# Patient Record
Sex: MALE | Race: WHITE | NOT HISPANIC OR LATINO | Employment: OTHER | URBAN - METROPOLITAN AREA
[De-identification: names, ages, dates, MRNs, and addresses within clinical notes are randomized per-mention and may not be internally consistent; named-entity substitution may affect disease eponyms.]

---

## 2017-05-30 ENCOUNTER — OFFICE VISIT (OUTPATIENT)
Dept: ORTHOPEDIC SURGERY | Facility: CLINIC | Age: 67
End: 2017-05-30

## 2017-05-30 VITALS
WEIGHT: 165 LBS | HEIGHT: 70 IN | DIASTOLIC BLOOD PRESSURE: 71 MMHG | BODY MASS INDEX: 23.62 KG/M2 | SYSTOLIC BLOOD PRESSURE: 110 MMHG | HEART RATE: 64 BPM

## 2017-05-30 DIAGNOSIS — Z96.641 STATUS POST TOTAL HIP REPLACEMENT, RIGHT: Primary | ICD-10-CM

## 2017-05-30 PROCEDURE — 99204 OFFICE O/P NEW MOD 45 MIN: CPT | Performed by: ORTHOPAEDIC SURGERY

## 2017-05-30 RX ORDER — TRAMADOL HYDROCHLORIDE 50 MG/1
50 TABLET ORAL EVERY 6 HOURS
COMMUNITY
Start: 2015-12-21 | End: 2017-05-30 | Stop reason: SDUPTHER

## 2017-05-30 RX ORDER — TRAMADOL HYDROCHLORIDE 50 MG/1
50 TABLET ORAL EVERY 6 HOURS
Qty: 90 TABLET | Refills: 0 | Status: SHIPPED | OUTPATIENT
Start: 2017-05-30 | End: 2017-11-21 | Stop reason: SDUPTHER

## 2017-05-30 RX ORDER — CEPHALEXIN 500 MG/1
500 CAPSULE ORAL 3 TIMES DAILY
Refills: 1 | COMMUNITY
Start: 2017-03-23

## 2017-08-22 ENCOUNTER — TELEPHONE (OUTPATIENT)
Dept: ORTHOPEDIC SURGERY | Facility: CLINIC | Age: 67
End: 2017-08-22

## 2017-08-22 NOTE — TELEPHONE ENCOUNTER
[facility] NEEDS A VERBAL ON THE PATIENTS TRAMADOL THAT WAS APPROVED AND FAXED OVER TODAY BECAUSE IT IS A CONTROLLED. PHONE [phone].

## 2017-08-22 NOTE — TELEPHONE ENCOUNTER
I spoke with the pharmacy and after reading the note in the patient's chart for prescription of Tramadol I gave verbal approval for the Tramadol 50mg 1 po every 6 hours prn for pain with 90 tablets and no refills. Roula PETERSEN)

## 2017-11-17 ENCOUNTER — CLINICAL SUPPORT (OUTPATIENT)
Dept: ORTHOPEDIC SURGERY | Facility: CLINIC | Age: 67
End: 2017-11-17

## 2017-11-17 VITALS
HEART RATE: 73 BPM | DIASTOLIC BLOOD PRESSURE: 89 MMHG | SYSTOLIC BLOOD PRESSURE: 125 MMHG | BODY MASS INDEX: 24.05 KG/M2 | HEIGHT: 70 IN | WEIGHT: 168 LBS

## 2017-11-17 DIAGNOSIS — M16.12 PRIMARY OSTEOARTHRITIS OF LEFT HIP: Primary | ICD-10-CM

## 2017-11-17 DIAGNOSIS — R52 PAIN: ICD-10-CM

## 2017-11-17 PROCEDURE — 99214 OFFICE O/P EST MOD 30 MIN: CPT | Performed by: ORTHOPAEDIC SURGERY

## 2017-11-17 RX ORDER — MELOXICAM 15 MG/1
TABLET ORAL
Qty: 60 TABLET | Refills: 0 | Status: SHIPPED | OUTPATIENT
Start: 2017-11-17 | End: 2017-11-30

## 2017-11-17 NOTE — PROGRESS NOTES
Orthopaedic Clinic Note: Hip Established Patient    Chief Complaint   Patient presents with   • Left Hip - Pain        HPI    It has been 6  month(s) since Mr. Paz's last visit. He returns to clinic today for New onset of left hip pain.  He is previously seen for revision right hip arthroplasty after infection.  He is on chronic suppressive antibiotics and has been doing well with the right hip arthroplasty.  In regards to the left hip, leg, groin pain, he states this pain began approximately 2 weeks ago.  He denies any injury or inciting event.  He states the pain is worse with bending, lifting the leg.  Resting, sitting improve the pain.  He rates pain a 4/10 on the pain scale today.  He is still able to perform most daily activities but does experience some discomfort.  No interventions for this have been tried to date.  He denies fevers, chills, constitutional symptoms.    Past Medical History:   Diagnosis Date   • Kidney stone       Past Surgical History:   Procedure Laterality Date   • HIP SURGERY Right     [facility]      Family History   Problem Relation Age of Onset   • Cancer Mother    • Cancer Father      Social History     Social History   • Marital status:      Spouse name: N/A   • Number of children: N/A   • Years of education: N/A     Occupational History   • Not on file.     Social History Main Topics   • Smoking status: Never Smoker   • Smokeless tobacco: Never Used   • Alcohol use Yes      Comment: occasional   • Drug use: No   • Sexual activity: Defer     Other Topics Concern   • Not on file     Social History Narrative      Current Outpatient Prescriptions on File Prior to Visit   Medication Sig Dispense Refill   • cephalexin (KEFLEX) 500 MG capsule   1   • traMADol (ULTRAM) 50 MG tablet Take 1 tablet by mouth Every 6 (Six) Hours. (Patient taking differently: Take 50 mg by mouth As Needed.) 90 tablet 0     No current facility-administered medications on file prior to visit.      "  No Known Allergies     Review of Systems     Physical Exam  Blood pressure 125/89, pulse 73, height 69.75\" (177.2 cm), weight 168 lb (76.2 kg).    Body mass index is 24.28 kg/(m^2).    GENERAL APPEARANCE: awake, alert, oriented, in no acute distress  LUNGS:  breathing nonlabored  EXTREMITIES: no clubbing, cyanosis  PERIPHERAL PULSES: palpable dorsalis pedis and posterior tibial pulses bilaterally.    GAIT:  Trendelenberg            Hip Exam:  Left    RANGE OF MOTION:  EXTENSION/FLEXION:  normal (0-110 degrees)  IR (at 90 degrees of flexion):  neutral  ER (at 90 degrees of flexion):  30  PAIN WITH HIP MOTION:  yes, Localized to groin and radiating down the medial thigh  PAIN WITH LOGROLL:  no     STINCHFIELD TEST: positive    STRENGTH:  ABDUCTOR:  4/5  ADDUCTOR:  5/5  HIP FLEXION:  5/5    GREATER TROCHANTER BURSAL PAIN:  yes    SENSATION TO LIGHT TOUCH:  DEEP PERONEAL/SUPERFICIAL PERONEAL/SURAL/SAPHENOUS/TIBIAL:   intact    EDEMA:  no  ERYTHEMA:  no  WOUNDS/INCISIONS:  no  _________________________________________________________________  _________________________________________________________________    RADIOGRAPHIC FINDINGS:   Indication: Left hip pain    Comparison: Todays xrays were compared to previous xrays from 5/30/17     AP pelvis: Right: Demonstrate a well positioned revision total hip without evidence of wear, loosening, fracture or osteolysis, femoral head is concentrically reduced within the acetabulum and No significant changes compared to prior radiographs.;Left: advanced, end-stage osteoarthritis with bone on bone articulation, subchondral sclerosis, and subchondral cysts, there are marginal osteophytes visualized at the femoral head-neck junction and Radiographs demonstrate advancing arthritic changes and wear compared to prior radiographs.      Assessment/Plan:   Diagnosis Plan   1. Primary osteoarthritis of left hip     2. Pain  XR Hip With or Without Pelvis 2 - 3 View Left     The patient has " clinical and radiographic evidence of advancing left hip osteoarthritis.  I discussed treatment options with him including surgical and nonsurgical intervention.  Patient is declining surgical intervention at this time as he is having to take care of his wife who has advanced stages of dementia.  Therefore we will proceed with conservative intervention.  I'll prescribe him an oral anti-inflammatory to be taken daily and send him to radiology for fluoroscopic guided left hip steroid injection.  I'll see him back in 3 months for repeat evaluation.  He was agreeable to this plan.    Reed Lackey MD  11/17/17  10:33 AM

## 2017-11-17 NOTE — PROGRESS NOTES
Orthopaedic Clinic Note: Hip New Patient    Chief Complaint   Patient presents with   • Left Hip - Pain        HPI    Prashanth Paz is a 67 y.o. male who presents with {right/left/bilateral:47457} hip pain for {Numbers; 0-30:30642} {DAYS, WEEKS, MONTHS, YEARS:35180}. Onset ***. Pain is localized to {Hip Pain Location:16424} and is a {0-10:79739}/10 on the pain scale. The pain is worse with {Movement:19389}; {Home Tx:48601} improve the pain. Previous treatments have included: {Previous Tx:94241} {Duration:30213}. Although some transient relief was reported with these interventions, these conservative measures have failed and symptoms have persisted. The patient is limited in daily activities and has had a significant decrease in quality of life as a result. He {denies/admits drainage:31413}      Past Medical History:   Diagnosis Date   • Kidney stone       Past Surgical History:   Procedure Laterality Date   • HIP SURGERY Right     [facility]      Family History   Problem Relation Age of Onset   • Cancer Mother    • Cancer Father      Social History     Social History   • Marital status:      Spouse name: N/A   • Number of children: N/A   • Years of education: N/A     Occupational History   • Not on file.     Social History Main Topics   • Smoking status: Never Smoker   • Smokeless tobacco: Never Used   • Alcohol use Yes      Comment: occasional   • Drug use: No   • Sexual activity: Defer     Other Topics Concern   • Not on file     Social History Narrative      Current Outpatient Prescriptions on File Prior to Visit   Medication Sig Dispense Refill   • cephalexin (KEFLEX) 500 MG capsule   1   • traMADol (ULTRAM) 50 MG tablet Take 1 tablet by mouth Every 6 (Six) Hours. (Patient taking differently: Take 50 mg by mouth As Needed.) 90 tablet 0     No current facility-administered medications on file prior to visit.       No Known Allergies     Review of Systems   Constitutional: Positive for activity  "change.   HENT: Positive for congestion.    Eyes: Negative.    Respiratory: Negative.    Cardiovascular: Negative.    Gastrointestinal: Negative.    Endocrine: Negative.    Genitourinary: Negative.    Musculoskeletal: Positive for arthralgias.   Skin: Negative.    Allergic/Immunologic: Negative.    Neurological: Negative.    Hematological: Bruises/bleeds easily.   Psychiatric/Behavioral: Negative.         {Common H&P Review Areas:00476}    Physical Exam  Blood pressure 125/89, pulse 73, height 69.75\" (177.2 cm), weight 168 lb (76.2 kg).    Body mass index is 24.28 kg/(m^2).    GENERAL APPEARANCE: {General Appearance:35766::\"awake, alert & oriented x 3, in no acute distress\",\"well developed, well nourished\"}  PSYCH: normal affect  LUNGS:  breathing nonlabored  EYES: sclera anicteric  CARDIOVASCULAR: palpable dorsalis pedis, palpable posterior tibial bilaterally. Capillary refill less than 2 seconds  EXTREMITIES: no clubbing, cyanosis  GAIT:  {Gait:79400}           Right Hip Exam:  ***    Left Hip Exam:   ***    ------------------------------------------------------------------    LEG LENGTHS:  {Leg Lengths:57622}  _____________________________________________________  _____________________________________________________    RADIOGRAPHIC FINDINGS:   Indication: ***    Comparison: {Darya XR comparison:50017}    AP pelvis, hip 2 views: Right: {Darya xr findings hip:08586}; Left: {Darya xr findings hip:24044}    Assessment/Plan:  No diagnosis found.  ***    Diana Rick  11/17/17  9:58 AM  "

## 2017-11-21 ENCOUNTER — TELEPHONE (OUTPATIENT)
Dept: ORTHOPEDIC SURGERY | Facility: CLINIC | Age: 67
End: 2017-11-21

## 2017-11-21 RX ORDER — TRAMADOL HYDROCHLORIDE 50 MG/1
TABLET ORAL
Qty: 90 TABLET | Refills: 0 | Status: SHIPPED | OUTPATIENT
Start: 2017-11-21 | End: 2017-12-12 | Stop reason: HOSPADM

## 2017-11-21 NOTE — TELEPHONE ENCOUNTER
Left message for patient letting him know his Tramadol prescription is ready to be picked up. --aco

## 2017-11-28 RX ORDER — MELOXICAM 7.5 MG/1
15 TABLET ORAL ONCE
Status: CANCELLED | OUTPATIENT
Start: 2017-11-28 | End: 2017-11-28

## 2017-11-28 RX ORDER — OXYCODONE HCL 10 MG/1
10 TABLET, FILM COATED, EXTENDED RELEASE ORAL ONCE
Status: CANCELLED | OUTPATIENT
Start: 2017-11-28 | End: 2017-11-28

## 2017-11-28 RX ORDER — PREGABALIN 25 MG/1
75 CAPSULE ORAL ONCE
Status: CANCELLED | OUTPATIENT
Start: 2017-11-28 | End: 2017-11-28

## 2017-11-28 RX ORDER — ACETAMINOPHEN 325 MG/1
1000 TABLET ORAL ONCE
Status: CANCELLED | OUTPATIENT
Start: 2017-11-28 | End: 2017-11-28

## 2017-11-29 PROBLEM — M16.12 PRIMARY OSTEOARTHRITIS OF LEFT HIP: Status: ACTIVE | Noted: 2017-11-29

## 2017-11-30 ENCOUNTER — APPOINTMENT (OUTPATIENT)
Dept: PREADMISSION TESTING | Facility: HOSPITAL | Age: 67
End: 2017-11-30

## 2017-11-30 VITALS — WEIGHT: 173.06 LBS | HEIGHT: 70 IN | BODY MASS INDEX: 24.78 KG/M2

## 2017-11-30 DIAGNOSIS — M16.12 PRIMARY OSTEOARTHRITIS OF LEFT HIP: ICD-10-CM

## 2017-11-30 DIAGNOSIS — Z01.89 LABORATORY TEST: Primary | ICD-10-CM

## 2017-11-30 LAB
ABO GROUP BLD: NORMAL
ALBUMIN SERPL-MCNC: 4 G/DL (ref 3.2–4.8)
ALBUMIN/GLOB SERPL: 2 G/DL (ref 1.5–2.5)
ALP SERPL-CCNC: 76 U/L (ref 25–100)
ALT SERPL W P-5'-P-CCNC: 31 U/L (ref 7–40)
ANION GAP SERPL CALCULATED.3IONS-SCNC: 7 MMOL/L (ref 3–11)
APTT PPP: 28.4 SECONDS (ref 24–31)
AST SERPL-CCNC: 28 U/L (ref 0–33)
BASOPHILS # BLD AUTO: 0.05 10*3/MM3 (ref 0–0.2)
BASOPHILS NFR BLD AUTO: 1 % (ref 0–1)
BILIRUB SERPL-MCNC: 0.3 MG/DL (ref 0.3–1.2)
BILIRUB UR QL STRIP: NEGATIVE
BUN BLD-MCNC: 20 MG/DL (ref 9–23)
BUN/CREAT SERPL: 25 (ref 7–25)
CALCIUM SPEC-SCNC: 9.2 MG/DL (ref 8.7–10.4)
CHLORIDE SERPL-SCNC: 107 MMOL/L (ref 99–109)
CLARITY UR: CLEAR
CO2 SERPL-SCNC: 27 MMOL/L (ref 20–31)
COLOR UR: YELLOW
CREAT BLD-MCNC: 0.8 MG/DL (ref 0.6–1.3)
DEPRECATED RDW RBC AUTO: 44.1 FL (ref 37–54)
EOSINOPHIL # BLD AUTO: 0.23 10*3/MM3 (ref 0–0.3)
EOSINOPHIL NFR BLD AUTO: 4.5 % (ref 0–3)
ERYTHROCYTE [DISTWIDTH] IN BLOOD BY AUTOMATED COUNT: 12.8 % (ref 11.3–14.5)
GFR SERPL CREATININE-BSD FRML MDRD: 96 ML/MIN/1.73
GLOBULIN UR ELPH-MCNC: 2 GM/DL
GLUCOSE BLD-MCNC: 106 MG/DL (ref 70–100)
GLUCOSE UR STRIP-MCNC: NEGATIVE MG/DL
HBA1C MFR BLD: 5.7 % (ref 4.8–5.6)
HCT VFR BLD AUTO: 40.9 % (ref 38.9–50.9)
HGB BLD-MCNC: 13.9 G/DL (ref 13.1–17.5)
HGB UR QL STRIP.AUTO: NEGATIVE
IMM GRANULOCYTES # BLD: 0.01 10*3/MM3 (ref 0–0.03)
IMM GRANULOCYTES NFR BLD: 0.2 % (ref 0–0.6)
INR PPP: 0.95
KETONES UR QL STRIP: NEGATIVE
LEUKOCYTE ESTERASE UR QL STRIP.AUTO: NEGATIVE
LYMPHOCYTES # BLD AUTO: 1.44 10*3/MM3 (ref 0.6–4.8)
LYMPHOCYTES NFR BLD AUTO: 28 % (ref 24–44)
MCH RBC QN AUTO: 32.3 PG (ref 27–31)
MCHC RBC AUTO-ENTMCNC: 34 G/DL (ref 32–36)
MCV RBC AUTO: 95.1 FL (ref 80–99)
MONOCYTES # BLD AUTO: 0.51 10*3/MM3 (ref 0–1)
MONOCYTES NFR BLD AUTO: 9.9 % (ref 0–12)
NEUTROPHILS # BLD AUTO: 2.91 10*3/MM3 (ref 1.5–8.3)
NEUTROPHILS NFR BLD AUTO: 56.4 % (ref 41–71)
NITRITE UR QL STRIP: NEGATIVE
PH UR STRIP.AUTO: 6 [PH] (ref 5–8)
PLATELET # BLD AUTO: 252 10*3/MM3 (ref 150–450)
PMV BLD AUTO: 10 FL (ref 6–12)
POTASSIUM BLD-SCNC: 4.5 MMOL/L (ref 3.5–5.5)
PROT SERPL-MCNC: 6 G/DL (ref 5.7–8.2)
PROT UR QL STRIP: NEGATIVE
PROTHROMBIN TIME: 10.3 SECONDS (ref 9.6–11.5)
RBC # BLD AUTO: 4.3 10*6/MM3 (ref 4.2–5.76)
RH BLD: POSITIVE
SODIUM BLD-SCNC: 141 MMOL/L (ref 132–146)
SP GR UR STRIP: 1.02 (ref 1–1.03)
UROBILINOGEN UR QL STRIP: NORMAL
WBC NRBC COR # BLD: 5.15 10*3/MM3 (ref 3.5–10.8)

## 2017-11-30 PROCEDURE — 85025 COMPLETE CBC W/AUTO DIFF WBC: CPT | Performed by: ORTHOPAEDIC SURGERY

## 2017-11-30 PROCEDURE — 85610 PROTHROMBIN TIME: CPT | Performed by: ORTHOPAEDIC SURGERY

## 2017-11-30 PROCEDURE — 80053 COMPREHEN METABOLIC PANEL: CPT | Performed by: ORTHOPAEDIC SURGERY

## 2017-11-30 PROCEDURE — 86901 BLOOD TYPING SEROLOGIC RH(D): CPT

## 2017-11-30 PROCEDURE — 93005 ELECTROCARDIOGRAM TRACING: CPT

## 2017-11-30 PROCEDURE — 81003 URINALYSIS AUTO W/O SCOPE: CPT | Performed by: ORTHOPAEDIC SURGERY

## 2017-11-30 PROCEDURE — 86900 BLOOD TYPING SEROLOGIC ABO: CPT

## 2017-11-30 PROCEDURE — 85730 THROMBOPLASTIN TIME PARTIAL: CPT | Performed by: ORTHOPAEDIC SURGERY

## 2017-11-30 PROCEDURE — G0480 DRUG TEST DEF 1-7 CLASSES: HCPCS | Performed by: ORTHOPAEDIC SURGERY

## 2017-11-30 PROCEDURE — 83036 HEMOGLOBIN GLYCOSYLATED A1C: CPT | Performed by: ORTHOPAEDIC SURGERY

## 2017-11-30 PROCEDURE — 36415 COLL VENOUS BLD VENIPUNCTURE: CPT

## 2017-11-30 ASSESSMENT — HOOS JR
HOOS JR SCORE: 64.664
HOOS JR SCORE: 8

## 2017-12-05 LAB
COTININE UR-MCNC: NORMAL NG/ML
NICOTINE SERPL-MCNC: NORMAL NG/ML

## 2017-12-05 RX ORDER — CHLORHEXIDINE GLUCONATE 4 G/100ML
SOLUTION TOPICAL DAILY
Qty: 236 ML | Refills: 0 | Status: SHIPPED | OUTPATIENT
Start: 2017-12-05 | End: 2017-12-12 | Stop reason: HOSPADM

## 2017-12-11 ENCOUNTER — HOSPITAL ENCOUNTER (INPATIENT)
Facility: HOSPITAL | Age: 67
LOS: 1 days | Discharge: HOME | End: 2017-12-12
Attending: ORTHOPAEDIC SURGERY | Admitting: ORTHOPAEDIC SURGERY

## 2017-12-11 ENCOUNTER — ANESTHESIA (OUTPATIENT)
Dept: PERIOP | Facility: HOSPITAL | Age: 67
End: 2017-12-11

## 2017-12-11 ENCOUNTER — APPOINTMENT (OUTPATIENT)
Dept: GENERAL RADIOLOGY | Facility: HOSPITAL | Age: 67
End: 2017-12-11

## 2017-12-11 ENCOUNTER — ANESTHESIA EVENT (OUTPATIENT)
Dept: PERIOP | Facility: HOSPITAL | Age: 67
End: 2017-12-11

## 2017-12-11 DIAGNOSIS — Z78.9 IMPAIRED MOBILITY AND ADLS: ICD-10-CM

## 2017-12-11 DIAGNOSIS — Z96.642 STATUS POST TOTAL REPLACEMENT OF LEFT HIP: ICD-10-CM

## 2017-12-11 DIAGNOSIS — M16.12 PRIMARY OSTEOARTHRITIS OF LEFT HIP: ICD-10-CM

## 2017-12-11 DIAGNOSIS — Z74.09 IMPAIRED MOBILITY AND ADLS: ICD-10-CM

## 2017-12-11 DIAGNOSIS — Z74.09 IMPAIRED FUNCTIONAL MOBILITY, BALANCE, GAIT, AND ENDURANCE: Primary | ICD-10-CM

## 2017-12-11 PROBLEM — R73.03 PREDIABETES: Status: ACTIVE | Noted: 2017-12-11

## 2017-12-11 LAB
ABO GROUP BLD: NORMAL
BLD GP AB SCN SERPL QL: NEGATIVE
GLUCOSE BLDC GLUCOMTR-MCNC: 113 MG/DL (ref 70–130)
GLUCOSE BLDC GLUCOMTR-MCNC: 182 MG/DL (ref 70–130)
GLUCOSE BLDC GLUCOMTR-MCNC: 195 MG/DL (ref 70–130)
GLUCOSE BLDC GLUCOMTR-MCNC: 89 MG/DL (ref 70–130)
RH BLD: POSITIVE

## 2017-12-11 PROCEDURE — 27130 TOTAL HIP ARTHROPLASTY: CPT | Performed by: ORTHOPAEDIC SURGERY

## 2017-12-11 PROCEDURE — 25010000002 ONDANSETRON PER 1 MG: Performed by: NURSE PRACTITIONER

## 2017-12-11 PROCEDURE — 25010000002 KETOROLAC TROMETHAMINE PER 15 MG: Performed by: ORTHOPAEDIC SURGERY

## 2017-12-11 PROCEDURE — 0SRB03A REPLACEMENT OF LEFT HIP JOINT WITH CERAMIC SYNTHETIC SUBSTITUTE, UNCEMENTED, OPEN APPROACH: ICD-10-PCS | Performed by: ORTHOPAEDIC SURGERY

## 2017-12-11 PROCEDURE — C1776 JOINT DEVICE (IMPLANTABLE): HCPCS | Performed by: ORTHOPAEDIC SURGERY

## 2017-12-11 PROCEDURE — 25010000002 MORPHINE PER 10 MG: Performed by: ORTHOPAEDIC SURGERY

## 2017-12-11 PROCEDURE — 27130 TOTAL HIP ARTHROPLASTY: CPT | Performed by: PHYSICIAN ASSISTANT

## 2017-12-11 PROCEDURE — 86901 BLOOD TYPING SEROLOGIC RH(D): CPT | Performed by: ORTHOPAEDIC SURGERY

## 2017-12-11 PROCEDURE — 25010000002 DEXAMETHASONE PER 1 MG: Performed by: NURSE ANESTHETIST, CERTIFIED REGISTERED

## 2017-12-11 PROCEDURE — 72170 X-RAY EXAM OF PELVIS: CPT

## 2017-12-11 PROCEDURE — 25010000003 CEFAZOLIN IN DEXTROSE 2-4 GM/100ML-% SOLUTION: Performed by: ORTHOPAEDIC SURGERY

## 2017-12-11 PROCEDURE — 63710000001 INSULIN LISPRO (HUMAN) PER 5 UNITS: Performed by: NURSE PRACTITIONER

## 2017-12-11 PROCEDURE — 86850 RBC ANTIBODY SCREEN: CPT | Performed by: ORTHOPAEDIC SURGERY

## 2017-12-11 PROCEDURE — 82962 GLUCOSE BLOOD TEST: CPT

## 2017-12-11 PROCEDURE — C1755 CATHETER, INTRASPINAL: HCPCS | Performed by: ORTHOPAEDIC SURGERY

## 2017-12-11 PROCEDURE — 86900 BLOOD TYPING SEROLOGIC ABO: CPT | Performed by: ORTHOPAEDIC SURGERY

## 2017-12-11 PROCEDURE — 25010000002 MIDAZOLAM PER 1 MG: Performed by: ANESTHESIOLOGY

## 2017-12-11 PROCEDURE — 25010000002 ROPIVACAINE PER 1 MG: Performed by: ORTHOPAEDIC SURGERY

## 2017-12-11 PROCEDURE — C2617 STENT, NON-COR, TEM W/O DEL: HCPCS | Performed by: ORTHOPAEDIC SURGERY

## 2017-12-11 PROCEDURE — 25010000002 HYDROMORPHONE PER 4 MG: Performed by: ORTHOPAEDIC SURGERY

## 2017-12-11 PROCEDURE — 97162 PT EVAL MOD COMPLEX 30 MIN: CPT

## 2017-12-11 PROCEDURE — 25010000002 PROPOFOL 1000 MG/ML EMULSION: Performed by: NURSE ANESTHETIST, CERTIFIED REGISTERED

## 2017-12-11 PROCEDURE — 25010000002 FENTANYL CITRATE (PF) 100 MCG/2ML SOLUTION: Performed by: NURSE ANESTHETIST, CERTIFIED REGISTERED

## 2017-12-11 PROCEDURE — 25010000002 ONDANSETRON PER 1 MG: Performed by: NURSE ANESTHETIST, CERTIFIED REGISTERED

## 2017-12-11 DEVICE — STEM FEM ACCOLADE2 V40 127D 35X111X45 SZ6: Type: IMPLANTABLE DEVICE | Site: HIP | Status: FUNCTIONAL

## 2017-12-11 DEVICE — HD FEM/HIP BIOLOX/DELTA V40 CERAM 36MM: Type: IMPLANTABLE DEVICE | Site: HIP | Status: FUNCTIONAL

## 2017-12-11 DEVICE — INSRT TRIDENT X3 0D 36MM SZE: Type: IMPLANTABLE DEVICE | Site: HIP | Status: FUNCTIONAL

## 2017-12-11 DEVICE — TOTL HIP HI DEMAND STRYKER: Type: IMPLANTABLE DEVICE | Site: HIP | Status: FUNCTIONAL

## 2017-12-11 DEVICE — SCRW CANC S/TAP 6.5X30MM: Type: IMPLANTABLE DEVICE | Site: HIP | Status: FUNCTIONAL

## 2017-12-11 DEVICE — SCRW CANC S/TAP 6.5X40MM: Type: IMPLANTABLE DEVICE | Site: HIP | Status: FUNCTIONAL

## 2017-12-11 DEVICE — SHLL ACET TRIDENT HEMI CLUSTER 52MM: Type: IMPLANTABLE DEVICE | Site: HIP | Status: FUNCTIONAL

## 2017-12-11 RX ORDER — ASPIRIN 325 MG
325 TABLET, DELAYED RELEASE (ENTERIC COATED) ORAL EVERY 12 HOURS SCHEDULED
Status: DISCONTINUED | OUTPATIENT
Start: 2017-12-12 | End: 2017-12-12 | Stop reason: HOSPADM

## 2017-12-11 RX ORDER — PROMETHAZINE HYDROCHLORIDE 25 MG/ML
6.25 INJECTION, SOLUTION INTRAMUSCULAR; INTRAVENOUS ONCE AS NEEDED
Status: DISCONTINUED | OUTPATIENT
Start: 2017-12-11 | End: 2017-12-11

## 2017-12-11 RX ORDER — PROMETHAZINE HYDROCHLORIDE 25 MG/1
25 SUPPOSITORY RECTAL ONCE AS NEEDED
Status: DISCONTINUED | OUTPATIENT
Start: 2017-12-11 | End: 2017-12-11

## 2017-12-11 RX ORDER — FAMOTIDINE 10 MG/ML
20 INJECTION, SOLUTION INTRAVENOUS ONCE
Status: DISCONTINUED | OUTPATIENT
Start: 2017-12-11 | End: 2017-12-11 | Stop reason: SDUPTHER

## 2017-12-11 RX ORDER — BISACODYL 10 MG
10 SUPPOSITORY, RECTAL RECTAL DAILY PRN
Status: DISCONTINUED | OUTPATIENT
Start: 2017-12-11 | End: 2017-12-12 | Stop reason: HOSPADM

## 2017-12-11 RX ORDER — OXYCODONE HCL 10 MG/1
10 TABLET, FILM COATED, EXTENDED RELEASE ORAL ONCE
Status: COMPLETED | OUTPATIENT
Start: 2017-12-11 | End: 2017-12-11

## 2017-12-11 RX ORDER — EPHEDRINE SULFATE 50 MG/ML
5 INJECTION, SOLUTION INTRAVENOUS ONCE AS NEEDED
Status: DISCONTINUED | OUTPATIENT
Start: 2017-12-11 | End: 2017-12-11

## 2017-12-11 RX ORDER — MELOXICAM 7.5 MG/1
15 TABLET ORAL ONCE
Status: COMPLETED | OUTPATIENT
Start: 2017-12-11 | End: 2017-12-11

## 2017-12-11 RX ORDER — MELOXICAM 7.5 MG/1
15 TABLET ORAL DAILY
Status: DISCONTINUED | OUTPATIENT
Start: 2017-12-11 | End: 2017-12-12 | Stop reason: HOSPADM

## 2017-12-11 RX ORDER — DEXTROSE MONOHYDRATE 25 G/50ML
25 INJECTION, SOLUTION INTRAVENOUS
Status: DISCONTINUED | OUTPATIENT
Start: 2017-12-11 | End: 2017-12-12 | Stop reason: HOSPADM

## 2017-12-11 RX ORDER — PREGABALIN 75 MG/1
75 CAPSULE ORAL ONCE
Status: COMPLETED | OUTPATIENT
Start: 2017-12-11 | End: 2017-12-11

## 2017-12-11 RX ORDER — LIDOCAINE HYDROCHLORIDE 10 MG/ML
0.5 INJECTION, SOLUTION EPIDURAL; INFILTRATION; INTRACAUDAL; PERINEURAL ONCE AS NEEDED
Status: COMPLETED | OUTPATIENT
Start: 2017-12-11 | End: 2017-12-11

## 2017-12-11 RX ORDER — ACETAMINOPHEN 325 MG/1
650 TABLET ORAL EVERY 4 HOURS PRN
Status: DISCONTINUED | OUTPATIENT
Start: 2017-12-11 | End: 2017-12-12 | Stop reason: HOSPADM

## 2017-12-11 RX ORDER — SODIUM CHLORIDE, SODIUM LACTATE, POTASSIUM CHLORIDE, CALCIUM CHLORIDE 600; 310; 30; 20 MG/100ML; MG/100ML; MG/100ML; MG/100ML
9 INJECTION, SOLUTION INTRAVENOUS CONTINUOUS
Status: DISCONTINUED | OUTPATIENT
Start: 2017-12-11 | End: 2017-12-12 | Stop reason: HOSPADM

## 2017-12-11 RX ORDER — ONDANSETRON 2 MG/ML
4 INJECTION INTRAMUSCULAR; INTRAVENOUS EVERY 6 HOURS PRN
Status: DISCONTINUED | OUTPATIENT
Start: 2017-12-11 | End: 2017-12-12 | Stop reason: HOSPADM

## 2017-12-11 RX ORDER — DOCUSATE SODIUM 100 MG/1
100 CAPSULE, LIQUID FILLED ORAL 2 TIMES DAILY
Status: DISCONTINUED | OUTPATIENT
Start: 2017-12-11 | End: 2017-12-12 | Stop reason: HOSPADM

## 2017-12-11 RX ORDER — ACETAMINOPHEN 650 MG
TABLET, EXTENDED RELEASE ORAL AS NEEDED
Status: DISCONTINUED | OUTPATIENT
Start: 2017-12-11 | End: 2017-12-11 | Stop reason: HOSPADM

## 2017-12-11 RX ORDER — OXYCODONE HYDROCHLORIDE AND ACETAMINOPHEN 5; 325 MG/1; MG/1
2 TABLET ORAL EVERY 4 HOURS PRN
Status: DISCONTINUED | OUTPATIENT
Start: 2017-12-11 | End: 2017-12-12 | Stop reason: HOSPADM

## 2017-12-11 RX ORDER — OXYCODONE HYDROCHLORIDE AND ACETAMINOPHEN 5; 325 MG/1; MG/1
1 TABLET ORAL EVERY 4 HOURS PRN
Status: DISCONTINUED | OUTPATIENT
Start: 2017-12-11 | End: 2017-12-12 | Stop reason: HOSPADM

## 2017-12-11 RX ORDER — BUPIVACAINE HYDROCHLORIDE 5 MG/ML
INJECTION, SOLUTION EPIDURAL; INTRACAUDAL AS NEEDED
Status: DISCONTINUED | OUTPATIENT
Start: 2017-12-11 | End: 2017-12-11 | Stop reason: SURG

## 2017-12-11 RX ORDER — LIDOCAINE HYDROCHLORIDE 10 MG/ML
0.5 INJECTION, SOLUTION EPIDURAL; INFILTRATION; INTRACAUDAL; PERINEURAL ONCE AS NEEDED
Status: DISCONTINUED | OUTPATIENT
Start: 2017-12-11 | End: 2017-12-11 | Stop reason: SDUPTHER

## 2017-12-11 RX ORDER — DEXAMETHASONE SODIUM PHOSPHATE 4 MG/ML
INJECTION, SOLUTION INTRA-ARTICULAR; INTRALESIONAL; INTRAMUSCULAR; INTRAVENOUS; SOFT TISSUE AS NEEDED
Status: DISCONTINUED | OUTPATIENT
Start: 2017-12-11 | End: 2017-12-11 | Stop reason: SURG

## 2017-12-11 RX ORDER — PROMETHAZINE HYDROCHLORIDE 25 MG/1
25 TABLET ORAL ONCE AS NEEDED
Status: DISCONTINUED | OUTPATIENT
Start: 2017-12-11 | End: 2017-12-11

## 2017-12-11 RX ORDER — BISACODYL 5 MG/1
10 TABLET, DELAYED RELEASE ORAL DAILY PRN
Status: DISCONTINUED | OUTPATIENT
Start: 2017-12-11 | End: 2017-12-12 | Stop reason: HOSPADM

## 2017-12-11 RX ORDER — NALOXONE HCL 0.4 MG/ML
0.1 VIAL (ML) INJECTION
Status: DISCONTINUED | OUTPATIENT
Start: 2017-12-11 | End: 2017-12-12 | Stop reason: HOSPADM

## 2017-12-11 RX ORDER — HYDROMORPHONE HYDROCHLORIDE 1 MG/ML
0.5 INJECTION, SOLUTION INTRAMUSCULAR; INTRAVENOUS; SUBCUTANEOUS
Status: DISCONTINUED | OUTPATIENT
Start: 2017-12-11 | End: 2017-12-12 | Stop reason: HOSPADM

## 2017-12-11 RX ORDER — KETOROLAC TROMETHAMINE 30 MG/ML
INJECTION, SOLUTION INTRAMUSCULAR; INTRAVENOUS AS NEEDED
Status: DISCONTINUED | OUTPATIENT
Start: 2017-12-11 | End: 2017-12-11 | Stop reason: HOSPADM

## 2017-12-11 RX ORDER — SODIUM CHLORIDE 0.9 % (FLUSH) 0.9 %
1-10 SYRINGE (ML) INJECTION AS NEEDED
Status: DISCONTINUED | OUTPATIENT
Start: 2017-12-11 | End: 2017-12-11 | Stop reason: HOSPADM

## 2017-12-11 RX ORDER — DOCUSATE SODIUM 100 MG/1
100 CAPSULE, LIQUID FILLED ORAL 2 TIMES DAILY PRN
Status: DISCONTINUED | OUTPATIENT
Start: 2017-12-11 | End: 2017-12-11

## 2017-12-11 RX ORDER — TRAMADOL HYDROCHLORIDE 50 MG/1
50 TABLET ORAL EVERY 8 HOURS PRN
Status: DISCONTINUED | OUTPATIENT
Start: 2017-12-11 | End: 2017-12-12 | Stop reason: HOSPADM

## 2017-12-11 RX ORDER — ONDANSETRON 4 MG/1
4 TABLET, FILM COATED ORAL EVERY 6 HOURS PRN
Status: DISCONTINUED | OUTPATIENT
Start: 2017-12-11 | End: 2017-12-12 | Stop reason: HOSPADM

## 2017-12-11 RX ORDER — ROPIVACAINE HYDROCHLORIDE 5 MG/ML
INJECTION, SOLUTION EPIDURAL; INFILTRATION; PERINEURAL AS NEEDED
Status: DISCONTINUED | OUTPATIENT
Start: 2017-12-11 | End: 2017-12-11 | Stop reason: HOSPADM

## 2017-12-11 RX ORDER — HYDRALAZINE HYDROCHLORIDE 20 MG/ML
10 INJECTION INTRAMUSCULAR; INTRAVENOUS EVERY 6 HOURS PRN
Status: DISCONTINUED | OUTPATIENT
Start: 2017-12-11 | End: 2017-12-12 | Stop reason: HOSPADM

## 2017-12-11 RX ORDER — MIDAZOLAM HYDROCHLORIDE 1 MG/ML
2 INJECTION INTRAMUSCULAR; INTRAVENOUS ONCE
Status: COMPLETED | OUTPATIENT
Start: 2017-12-11 | End: 2017-12-11

## 2017-12-11 RX ORDER — NICOTINE POLACRILEX 4 MG
15 LOZENGE BUCCAL
Status: DISCONTINUED | OUTPATIENT
Start: 2017-12-11 | End: 2017-12-12 | Stop reason: HOSPADM

## 2017-12-11 RX ORDER — MAGNESIUM HYDROXIDE 1200 MG/15ML
LIQUID ORAL AS NEEDED
Status: DISCONTINUED | OUTPATIENT
Start: 2017-12-11 | End: 2017-12-11 | Stop reason: HOSPADM

## 2017-12-11 RX ORDER — CEFAZOLIN SODIUM 2 G/100ML
2 INJECTION, SOLUTION INTRAVENOUS ONCE
Status: COMPLETED | OUTPATIENT
Start: 2017-12-11 | End: 2017-12-11

## 2017-12-11 RX ORDER — SODIUM CHLORIDE 9 MG/ML
100 INJECTION, SOLUTION INTRAVENOUS CONTINUOUS
Status: DISCONTINUED | OUTPATIENT
Start: 2017-12-11 | End: 2017-12-12 | Stop reason: HOSPADM

## 2017-12-11 RX ORDER — MORPHINE SULFATE 4 MG/ML
INJECTION, SOLUTION INTRAMUSCULAR; INTRAVENOUS AS NEEDED
Status: DISCONTINUED | OUTPATIENT
Start: 2017-12-11 | End: 2017-12-11 | Stop reason: HOSPADM

## 2017-12-11 RX ORDER — CEFAZOLIN SODIUM 2 G/100ML
2 INJECTION, SOLUTION INTRAVENOUS EVERY 8 HOURS
Status: COMPLETED | OUTPATIENT
Start: 2017-12-11 | End: 2017-12-12

## 2017-12-11 RX ORDER — LIDOCAINE HYDROCHLORIDE AND EPINEPHRINE 5; 5 MG/ML; UG/ML
INJECTION, SOLUTION INFILTRATION; PERINEURAL AS NEEDED
Status: DISCONTINUED | OUTPATIENT
Start: 2017-12-11 | End: 2017-12-11 | Stop reason: HOSPADM

## 2017-12-11 RX ORDER — SODIUM CHLORIDE 9 MG/ML
INJECTION, SOLUTION INTRAVENOUS AS NEEDED
Status: DISCONTINUED | OUTPATIENT
Start: 2017-12-11 | End: 2017-12-11 | Stop reason: HOSPADM

## 2017-12-11 RX ORDER — FAMOTIDINE 20 MG/1
20 TABLET, FILM COATED ORAL ONCE
Status: COMPLETED | OUTPATIENT
Start: 2017-12-11 | End: 2017-12-11

## 2017-12-11 RX ORDER — ACETAMINOPHEN 500 MG
1000 TABLET ORAL ONCE
Status: COMPLETED | OUTPATIENT
Start: 2017-12-11 | End: 2017-12-11

## 2017-12-11 RX ORDER — CEPHALEXIN 250 MG/1
500 CAPSULE ORAL EVERY 8 HOURS SCHEDULED
Status: DISCONTINUED | OUTPATIENT
Start: 2017-12-12 | End: 2017-12-12 | Stop reason: HOSPADM

## 2017-12-11 RX ORDER — ONDANSETRON 2 MG/ML
INJECTION INTRAMUSCULAR; INTRAVENOUS AS NEEDED
Status: DISCONTINUED | OUTPATIENT
Start: 2017-12-11 | End: 2017-12-11 | Stop reason: SURG

## 2017-12-11 RX ORDER — FENTANYL CITRATE 50 UG/ML
50 INJECTION, SOLUTION INTRAMUSCULAR; INTRAVENOUS
Status: DISCONTINUED | OUTPATIENT
Start: 2017-12-11 | End: 2017-12-11

## 2017-12-11 RX ORDER — FAMOTIDINE 20 MG/1
20 TABLET, FILM COATED ORAL ONCE
Status: DISCONTINUED | OUTPATIENT
Start: 2017-12-11 | End: 2017-12-11 | Stop reason: SDUPTHER

## 2017-12-11 RX ADMIN — PREGABALIN 75 MG: 75 CAPSULE ORAL at 06:53

## 2017-12-11 RX ADMIN — TRANEXAMIC ACID 1000 MG: 100 INJECTION, SOLUTION INTRAVENOUS at 07:50

## 2017-12-11 RX ADMIN — OXYCODONE AND ACETAMINOPHEN 1 TABLET: 5; 325 TABLET ORAL at 16:16

## 2017-12-11 RX ADMIN — BUPIVACAINE HYDROCHLORIDE 2 ML: 5 INJECTION, SOLUTION EPIDURAL; INTRACAUDAL; PERINEURAL at 07:43

## 2017-12-11 RX ADMIN — ONDANSETRON 4 MG: 2 INJECTION INTRAMUSCULAR; INTRAVENOUS at 16:13

## 2017-12-11 RX ADMIN — MELOXICAM 15 MG: 7.5 TABLET ORAL at 06:53

## 2017-12-11 RX ADMIN — PROPOFOL 45 MCG/KG/MIN: 10 INJECTION, EMULSION INTRAVENOUS at 07:50

## 2017-12-11 RX ADMIN — CEFAZOLIN SODIUM 2 G: 2 INJECTION, SOLUTION INTRAVENOUS at 16:13

## 2017-12-11 RX ADMIN — OXYCODONE HYDROCHLORIDE 10 MG: 10 TABLET, FILM COATED, EXTENDED RELEASE ORAL at 06:53

## 2017-12-11 RX ADMIN — DOCUSATE SODIUM 100 MG: 100 CAPSULE, LIQUID FILLED ORAL at 16:13

## 2017-12-11 RX ADMIN — CEFAZOLIN SODIUM 2 G: 2 INJECTION, SOLUTION INTRAVENOUS at 07:37

## 2017-12-11 RX ADMIN — INSULIN LISPRO 2 UNITS: 100 INJECTION, SOLUTION INTRAVENOUS; SUBCUTANEOUS at 22:10

## 2017-12-11 RX ADMIN — CEFAZOLIN SODIUM 2 G: 2 INJECTION, SOLUTION INTRAVENOUS at 23:58

## 2017-12-11 RX ADMIN — ACETAMINOPHEN 1000 MG: 500 TABLET ORAL at 06:53

## 2017-12-11 RX ADMIN — SODIUM CHLORIDE 100 ML/HR: 9 INJECTION, SOLUTION INTRAVENOUS at 11:37

## 2017-12-11 RX ADMIN — FENTANYL CITRATE 50 MCG: 50 INJECTION INTRAMUSCULAR; INTRAVENOUS at 10:11

## 2017-12-11 RX ADMIN — OXYCODONE AND ACETAMINOPHEN 2 TABLET: 5; 325 TABLET ORAL at 11:37

## 2017-12-11 RX ADMIN — INSULIN LISPRO 2 UNITS: 100 INJECTION, SOLUTION INTRAVENOUS; SUBCUTANEOUS at 16:56

## 2017-12-11 RX ADMIN — LIDOCAINE HYDROCHLORIDE 0.5 ML: 10 INJECTION, SOLUTION EPIDURAL; INFILTRATION; INTRACAUDAL; PERINEURAL at 06:53

## 2017-12-11 RX ADMIN — MUPIROCIN 1 APPLICATION: 20 OINTMENT TOPICAL at 06:53

## 2017-12-11 RX ADMIN — DEXAMETHASONE SODIUM PHOSPHATE 8 MG: 4 INJECTION, SOLUTION INTRAMUSCULAR; INTRAVENOUS at 07:50

## 2017-12-11 RX ADMIN — TRANEXAMIC ACID 1000 MG: 100 INJECTION, SOLUTION INTRAVENOUS at 09:01

## 2017-12-11 RX ADMIN — HYDROMORPHONE HYDROCHLORIDE 0.5 MG: 2 INJECTION INTRAMUSCULAR; INTRAVENOUS; SUBCUTANEOUS at 10:57

## 2017-12-11 RX ADMIN — FENTANYL CITRATE 50 MCG: 50 INJECTION INTRAMUSCULAR; INTRAVENOUS at 10:31

## 2017-12-11 RX ADMIN — FAMOTIDINE 20 MG: 20 TABLET, FILM COATED ORAL at 06:53

## 2017-12-11 RX ADMIN — SODIUM CHLORIDE, POTASSIUM CHLORIDE, SODIUM LACTATE AND CALCIUM CHLORIDE: 600; 310; 30; 20 INJECTION, SOLUTION INTRAVENOUS at 07:37

## 2017-12-11 RX ADMIN — SODIUM CHLORIDE, POTASSIUM CHLORIDE, SODIUM LACTATE AND CALCIUM CHLORIDE 9 ML/HR: 600; 310; 30; 20 INJECTION, SOLUTION INTRAVENOUS at 06:53

## 2017-12-11 RX ADMIN — MIDAZOLAM HYDROCHLORIDE 2 MG: 1 INJECTION, SOLUTION INTRAMUSCULAR; INTRAVENOUS at 07:19

## 2017-12-11 RX ADMIN — ONDANSETRON 4 MG: 2 INJECTION INTRAMUSCULAR; INTRAVENOUS at 09:41

## 2017-12-11 RX ADMIN — MELOXICAM 15 MG: 7.5 TABLET ORAL at 11:36

## 2017-12-11 NOTE — ANESTHESIA PROCEDURE NOTES
Spinal Block    Patient location during procedure: OR  Indication:at surgeon's request  Performed By  CRNA: TYLER MARQUEZ  Preanesthetic Checklist  Completed: patient identified, site marked, surgical consent, pre-op evaluation, timeout performed, IV checked, risks and benefits discussed and monitors and equipment checked  Spinal Block Prep:  Patient Position:sitting  Sterile Tech:cap, gloves, sterile barriers and mask  Prep:Chloraprep  Patient Monitoring:blood pressure monitoring, continuous pulse oximetry and EKG  Spinal Block Procedure  Approach:midline  Guidance:landmark technique and palpation technique  Location:L4-L5  Needle Type:Pamela  Needle Gauge:25 G  Placement of Spinal needle event:cerebrospinal fluid aspirated  Paresthesia: no  Fluid Appearance:clear  Post Assessment  Patient Tolerance:patient tolerated the procedure well with no apparent complications  Complications no  Additional Notes  Procedure:  Pt assisted to sitting position, with legs in position of comfort over side of bed.  Pt. instructed in optimal spine presentation, the spine was prepped/ Draped and the skin at insertion site was anesthetized with 1% Lidocaine 2 ml.  The spinal needle was then advanced until CSF flow was obtained and LA was injected:      Marcaine 0.5% PSF injected 10 Mg.

## 2017-12-11 NOTE — H&P
Pre-Op H&P  Prashanth Paz IV  [id number]  1950    Date of Service: 12/11/2017    Chief complaint: Left hip, leg and groin pain    HPI  Patient is a 67 y.o.male presents with left groin, leg and hip pain for approximately 2 months     Review of Systems:  General ROS: negative for chills, fever or skin lesions;  No changes since last office visit  Cardiovascular ROS: no chest pain or dyspnea on exertion  Respiratory ROS: no cough, shortness of breath, or wheezing    Allergies: No Known Allergies    Home Meds:    Prescriptions Prior to Admission   Medication Sig Dispense Refill Last Dose   • cephalexin (KEFLEX) 500 MG capsule Take 500 mg by mouth 3 (Three) Times a Day.  1 12/11/2017   • chlorhexidine (HIBICLENS) 4 % external liquid Apply  topically Daily. Shower daily with hibiclens solution as directed for 5 days prior to surgery. 236 mL 0 12/11/2017   • Chlorhexidine Gluconate 4 % solution Shower daily with hibiclens solution as directed for 5 days prior to surgery. 237 mL 0 12/11/2017   • mupirocin (BACTROBAN NASAL) 2 % nasal ointment into each nostril 2 (Two) Times a Day. Apply pea-sized amount to each nostril twice daily for 5 days prior to surgery 22 g 0 12/10/2017   • traMADol (ULTRAM) 50 MG tablet TAKE ONE TABLET BY MOUTH EVERY 6 HOURS 90 tablet 0 12/10/2017       PMH:   Past Medical History:   Diagnosis Date   • Arthritis    • Kidney stone      PSH:    Past Surgical History:   Procedure Laterality Date   • HIP ARTHROPLASTY     • HIP SURGERY Right     [facility]       Immunization History:  Influenza: yes  Pneumococcal: yes  Tetanus: unknown    Social History:   Tobacco:   History   Smoking Status   • Former Smoker   • Packs/day: 1.00   • Years: 10.00   • Types: Cigarettes   Smokeless Tobacco   • Never Used      Alcohol:     History   Alcohol Use   • Yes     Comment: occasional       Vitals:   11/30/17 11/17/17 5/30/17   BP  125/89 110/71   Heart Rate  73 64                  Physical Exam:  General  Appearance:    Alert, cooperative, no distress, appears stated age   Head:    Normocephalic, without obvious abnormality, atraumatic   Lungs:     Clear to auscultation bilaterally, respirations unlabored    Heart:   Regular rate and rhythm, S1 and S2 normal, no murmur, rub    or gallop    Abdomen:    Soft, non-tender.  +bowel sounds   Breast Exam:    deferred   Genitalia:    deferred   Extremities:   Extremities normal, atraumatic, no cyanosis or edema   Skin:   Skin color, texture, turgor normal, no rashes or lesions   Neurologic:   Grossly intact   Results Review  I reviewed the patient's new clinical results.    Cancer Staging (if applicable)  Cancer Patient: __ yes __no __unknown; If yes, clinical stage T:__ N:__M:__, stage group or __N/A    Impression: Pleasant 67 year old gentleman who presents with left hip osteoarthritis    Plan: Left total hip arthroplasty    JULIA Lester   12/11/2017   6:51 AM

## 2017-12-11 NOTE — BRIEF OP NOTE
TOTAL HIP ARTHROPLASTY  Progress Note    Prashanth Paz IV  12/11/2017    Pre-op Diagnosis:   Primary osteoarthritis of left hip [M16.12]       Post-Op Diagnosis Codes:     * Primary osteoarthritis of left hip [M16.12]    Procedure/CPT® Codes:  DE TOTAL HIP ARTHROPLASTY [53308]    Procedure(s):  TOTAL HIP ARTHROPLASTY LEFT    Surgeon(s):  Reed Lackey MD    Anesthesia: Spinal    Staff:   Circulator: Ilana Pacheco RN  Scrub Person: Miles Wynn  Vendor Representative: Harjinder Herman  Nursing Assistant: Dilip Bright  Assistant: Fabienne Robledo PA-C    Estimated Blood Loss: 250ml    Urine Voided: * No values recorded between 12/11/2017  7:37 AM and 12/11/2017  9:57 AM *    Specimens:                A: Femoral head      Drains:           Findings: End-stage osteoarthritis left hip    Complications: None apparent      Reed Lackey MD     Date: 12/11/2017  Time: 9:57 AM

## 2017-12-11 NOTE — H&P
Patient Name: Prashanth Paz IV  MRN: [id number]  : 1950  DOS: 2017    Attending: Reed Lackey MD    Primary Care Provider: Stephen Lou MD      Chief complaint:  Left hip, leg and groin pain    Subjective   Patient is a 67 y.o. male presented for left total hip arthroplasty by Dr. Lackey under spinal anesthesia. He tolerated surgery well and is admitted for further medical management. His hip has been severely painful for the last 3 months; he was having difficulty getting out of the car. He denies use of assistive device for ambulation.     He had previous right hip replacement about 3 years ago. Following that surgery he became septic and required a lengthy stay at [facility]. He is on lifetime suppression therapy with Keflex.    When seen postop he is doing well. His pain is well controlled. He denies nausea, shortness of breath or chest pain. No hx of DVT or PE.    ( Above is noted, agreed.  Seen in his room afterwards.  He is doing very well.  His pain control is adequate and he has already ambulated twice.  Already talking about possibly going home tomorrow.  No nausea or vomiting no shortness breath.)wy    Allergies:  No Known Allergies    Meds:  Prescriptions Prior to Admission   Medication Sig Dispense Refill Last Dose   • cephalexin (KEFLEX) 500 MG capsule Take 500 mg by mouth 3 (Three) Times a Day.  1 2017   • chlorhexidine (HIBICLENS) 4 % external liquid Apply  topically Daily. Shower daily with hibiclens solution as directed for 5 days prior to surgery. 236 mL 0 2017   • Chlorhexidine Gluconate 4 % solution Shower daily with hibiclens solution as directed for 5 days prior to surgery. 237 mL 0 2017   • mupirocin (BACTROBAN NASAL) 2 % nasal ointment into each nostril 2 (Two) Times a Day. Apply pea-sized amount to each nostril twice daily for 5 days prior to surgery 22 g 0 12/10/2017   • traMADol (ULTRAM) 50 MG tablet TAKE ONE TABLET BY MOUTH EVERY 6 HOURS  "90 tablet 0 12/10/2017       History:   Past Medical History:   Diagnosis Date   • Arthritis    • Kidney stone      Past Surgical History:   Procedure Laterality Date   • HIP ARTHROPLASTY     • HIP SURGERY Right     [facility] [place]     Family History   Problem Relation Age of Onset   • Cancer Mother    • Cancer Father      Social History   Substance Use Topics   • Smoking status: Former Smoker     Packs/day: 1.00     Years: 10.00     Types: Cigarettes   • Smokeless tobacco: Never Used   • Alcohol use Yes      Comment: occasional   He is  with 3 children. He is retired from the state.    Review of Systems  All systems were reviewed and negative except for:  Gastrointestinal: postitive for  constipation    Vital Signs  /65  Pulse 70  Temp 98.7 °F (37.1 °C) (Oral)   Resp 16  Ht 177.8 cm (70\")  Wt 78.5 kg (173 lb 1 oz)  SpO2 94%  BMI 24.83 kg/m2    Physical Exam:    General Appearance:    Alert, cooperative, in no acute distress   Head:    Normocephalic, without obvious abnormality, atraumatic   Eyes:            Lids and lashes normal, conjunctivae and sclerae normal, no   icterus, no pallor, corneas clear    Ears:    Ears appear intact with no abnormalities noted   Neck:   No adenopathy, supple, trachea midline, no thyromegaly, no     carotid bruit, no JVD   Lungs:     Clear to auscultation,respirations regular, even and                   unlabored    Heart:    Regular rhythm and normal rate, normal S1 and S2, no            Murmur    Abdomen:     Normal bowel sounds, no masses, no organomegaly, soft        non-tender, non-distended, no guarding, no rebound                 tenderness   Genitalia:    Deferred   Extremities:   Left hip Aquacel CDI. Adductor pillow in place   Pulses:   Pulses palpable and equal bilaterally   Skin:   No bleeding, bruising or rash   Neurologic:   Cranial nerves 2 - 12 grossly intact, sensation intact. Flexion and dorsiflexion intact bilateral feet.        I reviewed the " patient's new clinical results.     Results for LUANN STARKS IV (MRN [id number]) as of 12/11/2017 10:31   Ref. Range 11/30/2017 15:15   Glucose Latest Ref Range: 70 - 100 mg/dL 106 (H)   Sodium Latest Ref Range: 132 - 146 mmol/L 141   Potassium Latest Ref Range: 3.5 - 5.5 mmol/L 4.5   CO2 Latest Ref Range: 20.0 - 31.0 mmol/L 27.0   Chloride Latest Ref Range: 99 - 109 mmol/L 107   Anion Gap Latest Ref Range: 3.0 - 11.0 mmol/L 7.0   Creatinine Latest Ref Range: 0.60 - 1.30 mg/dL 0.80   BUN Latest Ref Range: 9 - 23 mg/dL 20   BUN/Creatinine Ratio Latest Ref Range: 7.0 - 25.0  25.0   Calcium Latest Ref Range: 8.7 - 10.4 mg/dL 9.2   eGFR Non African Amer Latest Ref Range: >60 mL/min/1.73 96   Alkaline Phosphatase Latest Ref Range: 25 - 100 U/L 76   Total Protein Latest Ref Range: 5.7 - 8.2 g/dL 6.0   ALT (SGPT) Latest Ref Range: 7 - 40 U/L 31   AST (SGOT) Latest Ref Range: 0 - 33 U/L 28   Total Bilirubin Latest Ref Range: 0.3 - 1.2 mg/dL 0.3   Albumin Latest Ref Range: 3.20 - 4.80 g/dL 4.00   Globulin Latest Units: gm/dL 2.0   A/G Ratio Latest Ref Range: 1.5 - 2.5 g/dL 2.0   Hemoglobin A1C Latest Ref Range: 4.80 - 5.60 % 5.70 (H)   Protime Latest Ref Range: 9.6 - 11.5 Seconds 10.3   INR Unknown 0.95   aPTT Latest Ref Range: 24.0 - 31.0 seconds 28.4   WBC Latest Ref Range: 3.50 - 10.80 10*3/mm3 5.15   RBC Latest Ref Range: 4.20 - 5.76 10*6/mm3 4.30   Hemoglobin Latest Ref Range: 13.1 - 17.5 g/dL 13.9   Hematocrit Latest Ref Range: 38.9 - 50.9 % 40.9   RDW Latest Ref Range: 11.3 - 14.5 % 12.8   MCV Latest Ref Range: 80.0 - 99.0 fL 95.1   MCH Latest Ref Range: 27.0 - 31.0 pg 32.3 (H)   MCHC Latest Ref Range: 32.0 - 36.0 g/dL 34.0   MPV Latest Ref Range: 6.0 - 12.0 fL 10.0   Platelets Latest Ref Range: 150 - 450 10*3/mm3 252     Assessment and Plan:   Principal Problem:    Status post total replacement of left hip  Active Problems:    Primary osteoarthritis of left hip    Prediabetes      Plan  1. PT/OT- early ambulation  post op  2. Pain control-prns   3. IS-encourage  4. DVT proph- mechs/ASA  5. Bowel regimen  6. Resume home medications as appropriate  7. Monitor post-op labs  8. DC planning for home. Probably tomorrow.wy    Continue Keflex per Dr. Lackey    PreDM( discussed with pt Dx, implications, plan(wy)  - hgb A1c on 11/30/17 5.7  - Accuchecks ACHS with low dose SSI  - DM educator consult    Seen and examined by me. Agree with above. Discussed with patient.    Mira Kinney MD  12/11/17  5:39 PM

## 2017-12-11 NOTE — ANESTHESIA POSTPROCEDURE EVALUATION
Patient: Prashanth Paz IV    Procedure Summary     Date Anesthesia Start Anesthesia Stop Room / Location    12/11/17 0737 1005 [facility] OR 20 / [facility]       Procedure Diagnosis Surgeon Provider    TOTAL HIP ARTHROPLASTY LEFT (Left Hip) Primary osteoarthritis of left hip  (Primary osteoarthritis of left hip [M16.12]) MD Obi Farmer MD          Anesthesia Type: spinal  Last vitals  BP   125/74 (12/11/17 0704)   Temp   97.6 °F (36.4 °C) (12/11/17 0704)   Pulse   62 (12/11/17 0704)   Resp   18 (12/11/17 0704)     SpO2   97 % (12/11/17 0704)     Post Anesthesia Care and Evaluation    Patient location during evaluation: PACU  Patient participation: complete - patient participated  Level of consciousness: awake and alert  Pain score: 0  Pain management: adequate  Airway patency: patent  Anesthetic complications: No anesthetic complications  PONV Status: none  Cardiovascular status: hemodynamically stable and acceptable  Respiratory status: nonlabored ventilation, acceptable and nasal cannula  Hydration status: acceptable

## 2017-12-11 NOTE — OP NOTE
OPERATIVE REPORT     DATE OF PROCEDURE: 12/11/17     SURGEON: Reed Lackey M.D.     ASSISTANT(S): Circulator: Ilana Pacheco RN  Scrub Person: Miles Wynn  Vendor Representative: Harjinder Herman  Nursing Assistant: Dilip Bright  Assistant: Fabienne Robledo PA-C    Note-PA was utilized during the case to facilitate positioning the patient, exposure, retraction, placement of final components and definitive closure.      PREOPERATIVE DIAGNOSIS: Advanced degenerative joint disease of the left hip secondary to osteoarthritis     POSTOPERATIVE DIAGNOSIS: same     PROCEDURE: Left Total Hip Arthroplasty     SURGICAL DETAILS:     APPROACH: Posterior     ANESTHESIA: Spinal plus local periarticular block     PREOPERATIVE ANTIBIOTICS: Ancef 2 g IV     TRANEXAMIC ACID: IV     ESTIMATED BLOOD LOSS: 250 cc     SPECIMENS: Femoral head     IMPLANTS:   : Jones   Acetabular component: 52 mm Trident   Acetabular screws: 2   Acetabular liner: 0° X3   Femoral component: Accolade  degree size 6   Femoral head: 36+0 mm delta Biolox Ceramic     DRAINS: None     LOCAL INJECTION: 1 cc Toradol 30mg/ml, 4 cc duramorph 2mg/ml, 20 cc 0.5% ropivicaine, 20 cc 0.5% lidocaine with 1:200,000 epinephrine, 15 cc preservative free normal saline     MODIFIER(S): None     COMPLICATIONS: None apparent     INDICATIONS FOR PROCEDURE: This patient has a history of progressive left hip pain and arthritis. The hip pain is severe with activity and has progressed significantly. Non-operative treatment has been attempted, but has not improved or controlled symptoms during normal daily activities. Motion has become limited and rotation severely restricted. X-rays reveal moderate-to-severe eburnation of articular cartilage on the superior weight bearing surface of the hip with circumferential acetabular and femoral neck osteophytes consistent with advanced hip osteoarthritis. A total hip arthroplasty was recommended at this time. The  risks, benefits, alternatives, and potential complications of the arthroplasty surgery were discussed with the patient in detail to include but not limited to infection, bleeding, anesthesia risks, sciatic nerve palsy, instability/dislocation, limb length discrepancy, aseptic loosening, osteolysis, blood clots, continued pain, iatrogenic fracture, myocardial infarction, stroke, and death. Specific details of the procedure, hospitalization, recovery, rehabilitation, and long-term precautions were also provided. Pre-operative teaching was provided. Implant/prosthesis selection was outlined, and the many options available were explained; the final choice will be made at the time of the procedure to match the anatomy and condition of the bone, ligaments, tendons, and muscles. Understanding of all topics was conveyed to me by the patient, and consent was given to proceed with a left total hip arthroplasty. The patient completed preoperative medical optimization and risk assessment, joint arthroplasty education, and MRSA decolonization with Mupirocin if indicated based on the preoperative nasal screen. Perioperative blood management and the potential for blood transfusion were discussed with risks and options clearly outlined.     INTRAOPERATIVE FINDINGS: End-stage osteoarthritis left hip with full-thickness cartilage loss the weightbearing portions of femoral head and acetabulum     PROCEDURE: The patient was identified in the preoperative holding area. The operative site was confirmed and marked. SEAMUS hose and a sequential compression device were placed on the nonoperative leg. The risks, benefits, and alternatives to surgery were again confirmed with the patient and the patient wished to proceed. The patient was brought to the operating room and placed on the operating room table in the supine position. A huddle was performed with the patient and all vital surgical team members to confirm the correct operative site,  procedure, anesthesia type, and operative plan with the patient. After anesthesia was performed, the patient was positioned in the lateral decubitus position on the pegboard and secured with the operative side up. An axillary roll was placed in the axilla and all bony prominences and pressure points were checked and padded. A relative leg length assessment was carried out and markers were placed for intraoperative assessment. Intravenous antibiotic prophylaxis was given and confirmed with the anesthesia team.     The operative leg was prepped and draped in the usual sterile fashion. A surgical time out was performed immediately preceding the incision with all personnel in the operating room to again confirm patient identity, the correct operative site and extremity, correct radiographic studies, availability of appropriate surgical equipment and agreement on the planned procedure. A posterolateral approach to the hip was performed through an incision centered over the greater trochanter. The incision was carried through the subcutaneous tissue to the underlying fascia dina and gluteus yelena fascia, which were incised and split posteriorly over the trochanter in the direction of the fibers. Hemostasis was obtained with electrocautery. The Charnley retractor was placed after carefully palpating the sciatic nerve which was protected throughout the case.     A standard posterior approach to the hip was performed by releasing the piriformis, short external rotators and posterior capsule and reflecting them posteriorly as a rectangular flap. The superior capsule was scarred down; it was released and excised. The labrum was split and the femoral head mobilized. The hip was then flexed, internally rotated and dislocated from the acetabulum without excessive force. Assessment of the femoral head revealed eburnation of the articular cartilage with complete loss of the weight bearing chondral surface. Osteophytes were  present as well. Careful measurements were performed using the center of the femoral head and the lesser trochanter as markers and a femoral neck cut was made according to the preoperative plan.     Attention was then turned to the acetabulum. Retractors were placed circumferentially for wide acetabular exposure. The labrum and osteophytes were debrided from the rim, and the medial wall was identified and the depth of the socket assessed by excising the pulvinar. Bleeders were controlled, especially the area of the obturator artery with the electrocautery. Acetabular reaming was then started with the hemispherical instrument matching the size of the excised femoral head. Sequential reaming of the acetabulum was then performed by increasing size in 2 mm increments, underreaming by 1 mm. The reamers created an excellent hemispherical bed of bleeding cancellous bone. The cup was impacted into position, targeting 40-45 degrees of abduction and 20-25 degrees of anteversion, with an excellent 1 mm press-fit. The press-fit was firm, stable, and apically seated. 2 screw(s) were used for additional support of the fixation. Further osteophyte debridement was done around the socket. All impinging soft tissue was removed from the edges of the socket. The polyethylene bearing/liner was then impacted into place and checked for stability.     Attention was then turned to the femur. The leg was positioned so access did not result in soft-tissue injury. The femoral preparation was started with a box osteotome. The medullary cavity of the femur was then entered and opened with hand reamers. Femoral stem broaches were then employed in an incremental fashion up to the final size, targeting 15-20 degrees of anteversion. The final broach was fully seated, had good rotational and axial stability, and was seated at the appropriate height in relation to the greater trochanter and the preoperative plan. Trial reduction was done. Excellent  stability and range of motion was achieved without impingement at any position. The hip was stable in full extension and external rotation as well as in flexion past 90 degrees, 20 degrees adduction and 60-70 degrees of internal rotation. Leg lengths were re-created within millimeters based on the markers and relative measurement. The hip was then dislocated and the trials removed. The wound was copiously irrigated, and the permanent femoral stem was then impacted down in approximately 15-20 degrees of anteversion. The press-fit was firm, and stable to axial and rotational force in all planes. The permanent femoral head was then impacted on the clean trunnion. The socket and wound were irrigated, suctioned, and inspected for debris. The final reduction was performed, and again leg length assessment and stability assessment of the hip were performed to confirm optimal component selection and stability in all planes without impingement when stressed to the extremes.     The wound was irrigated with dilute betadine solution as well as saline, and hemostasis obtained with electrocautery. A pain cocktail was injected into the pericapsular tissues. The posterior capsule, piriformis, and short external rotators were repaired utilizing #2 Ticron through drill holes in the greater trochanter. The sciatic nerve was palpated and found to be intact and the wound was irrigated. Instrument and sponge counts were completed and confirmed correct. The fascia dina and gluteal fascia were closed with interrupted #1 Vicryl suture and oversewn with #2 Stratafix. The deep subcutaneous tissue was closed with running #1 Stratafix suture and the superficial subcutaneous tissue with interrupted 2-0 Vicryl suture. A 3-0 monocryl running stitch was used to close skin followed by skin glue adhesive to seal the wound. A silver impregnated dressing was then placed, followed by SEAMUS hose and a sequential compression device to the operative limb,  followed by an abduction pillow. The patient was then returned to a supine position on the operating room table. The patient was sufficiently recovered from anesthesia, transferred to a hospital bed and taken to the PACU in stable condition.     One weight-based dose (10 mg/kg) of intravenous tranexamic acid was administered prior to incision. A second 10mg/kg intravenous dose was given prior to wound closure.     No apparent complications occurred during the procedure Instrument, sponge and needle counts were correct x 2.     The patient underwent risk stratification preoperatively and aspirin was chosen for DVT prophylaxis. Delay in starting chemical prophylaxis for 23 hours from surgical incision was over concerns for hematoma formation and wound related issues.     POST OPERATIVE PLAN:   Protected weight bearing as tolerated   Posterior hip precautions x 6 weeks   PT/OT for mobilization and medical equipment needs   23 hours perioperative antibiotic prophylaxis   Pain control with PO/IV meds   Keep silver dressing in place for 7 days post op. Change dressing only if saturated.   SEAMUS hose and SCD's to bilateral lower extremities   Social work for discharge planning needs   Follow up in 3 weeks for post operative wound check with XR AP pelvis.

## 2017-12-11 NOTE — PLAN OF CARE
Problem: Patient Care Overview (Adult)  Goal: Plan of Care Review  Outcome: Ongoing (interventions implemented as appropriate)    12/11/17 1521   Coping/Psychosocial Response Interventions   Plan Of Care Reviewed With patient;family   Patient Care Overview   Progress progress toward functional goals as expected   Outcome Evaluation   Outcome Summary/Follow up Plan PT eval complete. Pt presents with decreased functional independence warranting further need for skilled PT services. Pt ambulated 160 feet with CGA and RW, limited by fatigue. PT will follow to improve strength, balance, and functional mobility. Will initiate stair training next visit for safe d/c home. Recommend d/c home with assist and HHPT.          Problem: Inpatient Physical Therapy  Goal: Bed Mobility Goal LTG- PT  Outcome: Ongoing (interventions implemented as appropriate)    12/11/17 1521   Bed Mobility PT LTG   Bed Mobility PT LTG, Date Established 12/11/17   Bed Mobility PT LTG, Time to Achieve 3 days   Bed Mobility PT LTG, Activity Type supine to sit/sit to supine   Bed Mobility PT LTG, [place] Level independent       Goal: Transfer Training Goal 1 LTG- PT  Outcome: Ongoing (interventions implemented as appropriate)    12/11/17 1521   Transfer Training PT LTG   Transfer Training PT LTG, Date Established 12/11/17   Transfer Training PT LTG, Time to Achieve 3 days   Transfer Training PT LTG, Activity Type sit to stand/stand to sit   Transfer Training PT LTG, [place] Level conditional independence   Transfer Training PT LTG, Assist Device walker, rolling       Goal: Gait Training Goal LTG- PT  Outcome: Ongoing (interventions implemented as appropriate)    12/11/17 1521   Gait Training PT LTG   Gait Training Goal PT LTG, Date Established 12/11/17   Gait Training Goal PT LTG, Time to Achieve 3 days   Gait Training Goal PT LTG, [place] Level conditional independence   Gait Training Goal PT LTG, Assist Device walker, rolling   Gait  Training Goal PT LTG, Distance to Achieve 500 feet       Goal: Stair Training Goal STG- PT  Outcome: Ongoing (interventions implemented as appropriate)    12/11/17 1521   Stair Training PT STG   Stair Training Goal PT STG, Date Established 12/11/17   Stair Training Goal PT STG, Time to Achieve 3 days   Stair Training Goal PT STG, Number of Steps 1   Stair Training Goal PT STG, [place] Level conditional independence   Stair Training Goal PT STG, Assist Device walker, rolling       Goal: Stair Training Goal LTG- PT  Outcome: Ongoing (interventions implemented as appropriate)    12/11/17 1521   Stair Training PT LTG   Stair Training Goal PT LTG, Date Established 12/11/17   Stair Training Goal PT LTG, Time to Achieve 3 days   Stair Training Goal PT LTG, Number of Steps 14   Stair Training Goal PT LTG, [place] Level supervision required   Stair Training Goal PT LTG, Assist Device 1 handrail

## 2017-12-11 NOTE — DISCHARGE INSTRUCTIONS
"DISCHARGE INSTRUCTIONS   Dr. Lackey     Total Hip Replacement/Hip Hemiarthroplasty     Wound Care   1) Keep wound / incision area clean and dry.   2) Dressing to remain in place until post-operative day 7. Upon dressing removal, assess for wound drainage. If no drainage is present, keep wound / incision area open to air as much as possible. If drainage is present, place sterile dressing to cover wound and assess daily. If drainage continues to occur after post-operative day 14, call the office for an urgent appointment. (You should be seen in the clinic within 1-2 days of calling).   3) No baths or swimming until otherwise instructed. The wound must remain dry for 10 days after surgery. After 10 days, you may begin to shower only if no drainage is present. No submerging the wound under standing water until cleared by your physician (no baths, hot tubs, swimming pools, etc). Sponge baths are the best way to perform personal hygiene while at the same time protecting the wound from moisture.   4) Prior to showering, the wound must remain dry for 72 consecutive hours (no drainage whatsoever) prior to showering. If the wound drains or spots, the clock \"resets\" - make sure the wound has been drainage-free for 72 consecutive hours.   5) Once you are allowed to get the wound wet, please use gentle soap to wash the wound area. DO NOT aggressively scrub the wound with a washcloth or bath sponge. Please visually inspect your wound(s) at least once daily. If the wound(s) are in a difficult to see location, please use a mirror or have someone else assist with visual inspection.   6) No scrubbing the wound. You may \"pad dry\" the wound, but do not rub, as this may open up the wound and pre-dispose to wound infection.   7) Do not apply lotions or creams to incision site, unless instructed otherwise.   8) Observe for redness, swelling, or drainage. Please call the clinic immediately if you have fevers, chills with warmth/redness " "surrounding wound site or if you notice pus drainage from the wound site     Activity   No heavy lifting objects greater than 10 pounds.   No driving while on narcotic pain medication.   No submerging wound under standing water (pool, bath tub, etc.) until otherwise instructed.   You may be protected weightbearing as tolerated on your operative (left lower) extremity   Use a walker for ambulation for at least 2 weeks after surgery.  May wean from walker after 2 weeks if approved by your therapist.   Posterior hip precautions for 6 weeks: No bending the hip past 90 degrees. Do not allow the leg to cross the midline of your body (adduction). No twisting motions. Ask your physical therapist to review these precautions with you.     Blood Clot Prophylaxis   (Aspirin vs. Lovenox administration is determined by your surgeon and tailored to your specific risk profile. You will be discharged with either of these medications, but not both.) You will need to complete a total 4 week course of enteric coated aspirin 325 mg twice daily, in order to minimize your risk of blood clots following surgery. You will be supplied with a prescription to obtain this. You will need to compete a total 2 week course of Lovenox after surgery, in order to minimize the risk of blood clots following surgery. This requires a single shot in the abdomen, to be taken once daily. You will be supplied with the prescription to obtain this. Prior to your discharge from the hospital, the nursing staff will instruct you on self-administration of the Lovenox, if you will be returning directly home from the hospital.     Discharge Pain Medications   You will be given a prescription for pain medication. You should start taking this the same day after your surgery. Wean off as tolerated. Do not wait to take the pain medication until the pain is severe, as it will be difficult to \"catch up\" once this occurs. The pain medication usually reaches its full effect " ~1 hour after ingesting. If you have been sent home on Valium, this medication works well for muscle spasms. If you have been sent home on Colace, this medication should be taken until you are off all narcotic (i.e. Norco, Percocet, Oxycodone, etc) pain medications, in order to prevent constipation. Percocet or Norco have Tylenol in their ingredient lists. You must be careful not to exceed 4,000mg (4 grams) of Tylenol, from all sources, within a single 24-hr period. This means that you may not take more than 12 Percocets or Norcos within a 24-hr period. Do NOT take Regular or Extra Strength Tylenol when taking your Percocet or Norco medications. Some common side effects of the narcotic pain medications (Percocet, Oxycodone, Norco, etc) include nausea and itching. Benadryl is a great over the counter medication that helps calm your stomach, decreases your anxiety levels, and minimizes the itching. You can easily purchase this at your local pharmacy as an over-the-counter medication. Please abide by the instructions as printed on the bottle. If your nausea persists, make sure to take small amounts of crackers or other lighter foods.     Follow-Up   Follow-up with Dr. Lackey's office in 3 weeks from the surgery date for a post-operative evaluation. Have the following xrays done upon arrival to the follow-up appointment: AP pelvis. Please call Dr. Lackey's office at [phone] for orthopaedic appointments or questions.

## 2017-12-11 NOTE — ANESTHESIA PREPROCEDURE EVALUATION
Anesthesia Evaluation     Patient summary reviewed and Nursing notes reviewed   no history of anesthetic complications:  NPO Solid Status: > 8 hours  NPO Liquid Status: > 8 hours     Airway   Mallampati: II  TM distance: >3 FB  Neck ROM: full  no difficulty expected  Dental      Pulmonary - negative pulmonary ROS and normal exam   Cardiovascular - negative cardio ROS and normal exam        Neuro/Psych- negative ROS  GI/Hepatic/Renal/Endo - negative ROS     Musculoskeletal     Abdominal    Substance History      OB/GYN          Other   (+) arthritis                                   Anesthesia Plan    ASA 2     spinal     intravenous induction   Anesthetic plan and risks discussed with patient.    Plan discussed with CRNA.

## 2017-12-11 NOTE — THERAPY EVALUATION
Acute Care - Physical Therapy Initial Evaluation  [facility]     Patient Name: Prashanth Paz IV  : 1950  MRN: [id number]  Today's Date: 2017   Onset of Illness/Injury or Date of Surgery Date: 17  Date of Referral to PT: 17  Referring Physician: MD Darya      Admit Date: 2017     Visit Dx:    ICD-10-CM ICD-9-CM   1. Impaired functional mobility, balance, gait, and endurance Z74.09 V49.89   2. Primary osteoarthritis of left hip M16.12 715.15     Patient Active Problem List   Diagnosis   • Primary osteoarthritis of left hip   • Status post total replacement of left hip   • Prediabetes     Past Medical History:   Diagnosis Date   • Arthritis    • Kidney stone      Past Surgical History:   Procedure Laterality Date   • HIP ARTHROPLASTY     • HIP SURGERY Right     [facility]          PT ASSESSMENT (last 72 hours)      PT Evaluation       17 1308 17 0707    Rehab Evaluation    Document Type evaluation  -LM     Subjective Information agree to therapy;complains of;pain  -LM     Patient Effort, Rehab Treatment good  -LM     Symptoms Noted During/After Treatment none  -LM     General Information    Patient Profile Review yes  -LM     Onset of Illness/Injury or Date of Surgery Date 17  -LM     Referring Physician MD Darya  -LM     General Observations Pt received supine in bed with O2 via NC, IV intact, family at bedside.  -LM     Pertinent History Of Current Problem Pt presents for surgical management of left hip, groin and leg pain that failed to improve with conservative management. 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  -LM     Precautions/Limitations hip precautions- left;fall precautions  -LM     Prior Level of Function min assist:;all household mobility;community mobility;gait;transfer;bed mobility;ADL's  -LM     Equipment Currently Used at Home walker,  rolling;shower chair;grab bar;raised toilet;cane, quad  -LM     Plans/Goals Discussed With patient and family;agreed upon  -LM     Risks Reviewed patient and family:;LOB;nausea/vomiting;dizziness;increased discomfort;change in vital signs  -LM     Benefits Reviewed patient and family:;improve function;increase independence;increase strength;increase balance;decrease pain  -LM     Barriers to Rehab none identified  -LM     Living Environment    Lives With spouse  -LM spouse  -MY    Living Arrangements house  -LM house  -MY    Home Accessibility bed and bath are not on the first floor;stairs to enter home;stairs within home   walk-in shower  -LM no concerns  -MY    Number of Stairs to Enter Home 1  -LM     Number of Stairs Within Home 14  -LM     Stair Railings at Home inside, present on right side   no handrails outside  -LM     Type of Financial/Environmental Concern  none  -MY    Transportation Available  car;family or friend will provide  -MY    Living Environment Comment Pt lives in 2-story home with bed/bath on second floor. Daughter will be staying with patient to assist as needed.  -LM     Clinical Impression    Date of Referral to PT 12/11/17  -LM     PT Diagnosis s/p elective left SALVADOR  -LM     Prognosis good  -LM     Patient/Family Goals Statement return home  -LM     Criteria for Skilled Therapeutic Interventions Met yes;treatment indicated  -LM     Rehab Potential good, to achieve stated therapy goals  -LM     Pain Assessment    Pain Assessment 0-10  -LM     Pain Score 2  -LM     Post Pain Score 2  -LM     Pain Type Acute pain  -LM     Pain Location Hip  -LM     Pain Orientation Left  -LM     Pain Intervention(s) Repositioned;Ambulation/increased activity  -LM     Response to Interventions tolerated  -LM     Vision Assessment/Intervention    Visual Impairment WFL  -LM     Cognitive Assessment/Intervention    Current Cognitive/Communication Assessment functional  -LM     Orientation Status oriented x 4  -LM      Follows Commands/Answers Questions 100% of the time;able to follow single-step instructions;needs cueing  -LM     Personal Safety WNL/WFL  -LM     Personal Safety Interventions fall prevention program maintained;gait belt;nonskid shoes/slippers when out of bed;supervised activity  -LM     ROM (Range of Motion)    General ROM lower extremity range of motion deficits identified   defer UE to OT  -LM     General LE Assessment    ROM hip, left: LE ROM deficit  -LM     ROM Detail left hip ROM impaired 25%  -LM     MMT (Manual Muscle Testing)    General MMT Assessment lower extremity strength deficits identified   defer UE to OT  -LM     General MMT Assessment Detail left LE not formally tested; right LE functionally 4+/5  -LM     Lower Extremity    Lower Ext Manual Muscle Testing --  -LM     Mobility Assessment/Training    Extremity Weight-Bearing Status left lower extremity  -LM     Left Lower Extremity Weight-Bearing weight-bearing as tolerated  -LM     Bed Mobility, Assessment/Treatment    Bed Mobility, Assistive Device head of bed elevated  -LM     Bed Mob, Supine to Sit, [place] supervision required;verbal cues required  -LM     Bed Mob, Sit to Supine, [place] not tested   Pt left UIC  -LM     Bed Mobility, Safety Issues decreased use of legs for bridging/pushing  -LM     Bed Mobility, Impairments strength decreased;pain  -LM     Bed Mobility, Comment Verbal cues for sequencing and to adhere to hip precautions. Pt able to independently raise trunk to sitting and then use bilateral UEs to move body towards EOB. Pt denied dizziness upon sitting EOB.   -LM     Transfer Assessment/Treatment    Transfers, Sit-Stand [place] contact guard assist;2 person assist required;verbal cues required  -LM     Transfers, Stand-Sit [place] contact guard assist;2 person assist required;verbal cues required  -LM     Transfers, Sit-Stand-Sit, Assist Device rolling walker  -LM     Transfer, Safety Issues sequencing  ability decreased;step length decreased;weight-shifting ability decreased  -LM     Transfer, Impairments ROM decreased;strength decreased;pain  -LM     Transfer, Comment Verbal cues to push from bed with at least one hand when standing, reach for chair armrests when sitting, step out left LE prior to t/f. Pt denied dizziness upon standing.  -LM     Gait Assessment/Treatment    Gait, [place] Level contact guard assist;1 person + 1 person to manage equipment  -LM     Gait, Assistive Device rolling walker  -LM     Gait, Distance (Feet) 160  -LM     Gait, Gait Pattern Analysis swing-through gait  -LM     Gait, Gait Deviations left:;dominique decreased;decreased heel strike;forward flexed posture;step length decreased;weight-shifting ability decreased  -LM     Gait, Safety Issues sequencing ability decreased;step length decreased;weight-shifting ability decreased  -LM     Gait, Impairments ROM decreased;strength decreased;pain  -LM     Gait, Comment Pt ambulated with step-through pattern at slow pace. Initially, pt exhibited increased bilateral UE weight bearing to off-load left LE during turns. Verbal cues to increase left LE weight-bearing, decrease bilateral UE weight bearing, upright posture, and increase step length. Gait limited by fatigue.  -LM     Motor Skills/Interventions    Additional Documentation Balance Skills Training (Group)  -LM     Balance Skills Training    Sitting-Level of Assistance Independent  -LM     Sitting-Balance Support Right upper extremity supported;Left upper extremity supported;Feet supported  -LM     Standing-Level of Assistance Contact guard  -LM     Static Standing Balance Support assistive device  -LM     Gait Balance-Level of Assistance Contact guard  -LM     Gait Balance Support assistive device  -LM     Therapy Exercises    Exercise Protocols total hip  -LM     Total Hip Exercises left:;10 reps;ankle pumps/circles;quad set;glut set;LAQ  -LM     Sensory Assessment/Intervention     Sensory Impairment --   Pt denies n/t in bilateral LEs; able to actively DF L ankle  -LM     Positioning and Restraints    Pre-Treatment Position in bed  -LM     Post Treatment Position chair  -LM     In Chair notified nsg;reclined;sitting;call light within reach;encouraged to call for assist;exit alarm on;with family/caregiver;legs elevated  -LM       User Key  (r) = Recorded By, (t) = Taken By, (c) = Cosigned By    Initials Name Provider Type    MY Mojgan Andino, RN Registered Nurse    IVAN Cuadra, ROHAN Physical Therapist          Physical Therapy Education     Title: PT OT SLP Therapies (Active)     Topic: Physical Therapy (Active)     Point: Mobility training (Active)    Learning Progress Summary    Learner Readiness Method Response Comment Documented by Status   Patient Acceptance E,D NR Reviewed benefits of activity, hip precautions, WB status, correct gait mechanics.  12/11/17 1520 Active   Family Acceptance E,D NR Reviewed benefits of activity, hip precautions, WB status, correct gait mechanics.  12/11/17 1520 Active               Point: Body mechanics (Active)    Learning Progress Summary    Learner Readiness Method Response Comment Documented by Status   Patient Acceptance E,D NR Reviewed benefits of activity, hip precautions, WB status, correct gait mechanics.  12/11/17 1520 Active   Family Acceptance E,D NR Reviewed benefits of activity, hip precautions, WB status, correct gait mechanics.  12/11/17 1520 Active               Point: Precautions (Active)    Learning Progress Summary    Learner Readiness Method Response Comment Documented by Status   Patient Acceptance E,D NR Reviewed benefits of activity, hip precautions, WB status, correct gait mechanics.  12/11/17 1520 Active   Family Acceptance E,D NR Reviewed benefits of activity, hip precautions, WB status, correct gait mechanics.  12/11/17 1520 Active                      User Key     Initials Effective Dates Name Provider  Type Discipline    LM 06/09/17 -  Elke Cuadra, PT Physical Therapist PT                PT Recommendation and Plan  Anticipated Equipment Needs At Discharge: other (see comments) (none)  Anticipated Discharge Disposition: home with assist, home with home health  Demonstrates Need for Referral to Another Service: home health care  Planned Therapy Interventions: balance training, bed mobility training, gait training, home exercise program, patient/family education, stair training, strengthening, transfer training  PT Frequency: 2 times/day  Plan of Care Review  Plan Of Care Reviewed With: patient, family  Progress: progress toward functional goals as expected  Outcome Summary/Follow up Plan: PT eval complete. Pt presents with decreased functional independence warranting further need for skilled PT services. Pt ambulated 160 feet with CGA and RW, limited by fatigue. PT will follow to improve strength, balance, and functional mobility. Will initiate stair training next visit for safe d/c home. Recommend d/c home with assist and HHPT.           IP PT Goals       12/11/17 1521          Bed Mobility PT LTG    Bed Mobility PT LTG, Date Established 12/11/17  -LM      Bed Mobility PT LTG, Time to Achieve 3 days  -LM      Bed Mobility PT LTG, Activity Type supine to sit/sit to supine  -LM      Bed Mobility PT LTG, [place] Level independent  -LM      Transfer Training PT LTG    Transfer Training PT LTG, Date Established 12/11/17  -LM      Transfer Training PT LTG, Time to Achieve 3 days  -LM      Transfer Training PT LTG, Activity Type sit to stand/stand to sit  -LM      Transfer Training PT LTG, [place] Level conditional independence  -LM      Transfer Training PT LTG, Assist Device walker, rolling  -LM      Gait Training PT LTG    Gait Training Goal PT LTG, Date Established 12/11/17  -LM      Gait Training Goal PT LTG, Time to Achieve 3 days  -LM      Gait Training Goal PT LTG, [place] Level conditional  independence  -LM      Gait Training Goal PT LTG, Assist Device walker, rolling  -LM      Gait Training Goal PT LTG, Distance to Achieve 500 feet  -LM      Stair Training PT STG    Stair Training Goal PT STG, Date Established 12/11/17  -LM      Stair Training Goal PT STG, Time to Achieve 3 days  -LM      Stair Training Goal PT STG, Number of Steps 1  -LM      Stair Training Goal PT STG, [place] Level conditional independence  -LM      Stair Training Goal PT STG, Assist Device walker, rolling  -LM      Stair Training PT LTG    Stair Training Goal PT LTG, Date Established 12/11/17  -LM      Stair Training Goal PT LTG, Time to Achieve 3 days  -LM      Stair Training Goal PT LTG, Number of Steps 14  -LM      Stair Training Goal PT LTG, [place] Level supervision required  -LM      Stair Training Goal PT LTG, Assist Device 1 handrail  -LM        User Key  (r) = Recorded By, (t) = Taken By, (c) = Cosigned By    Initials Name Provider Type    IVAN Cuadra PT Physical Therapist                Outcome Measures       12/11/17 1308          How much help from another person do you currently need...    Turning from your back to your side while in flat bed without using bedrails? 3  -LM      Moving from lying on back to sitting on the side of a flat bed without bedrails? 3  -LM      Moving to and from a bed to a chair (including a wheelchair)? 3  -LM      Standing up from a chair using your arms (e.g., wheelchair, bedside chair)? 3  -LM      Climbing 3-5 steps with a railing? 3  -LM      To walk in hospital room? 3  -LM      AM-PAC 6 Clicks Score 18  -LM      Functional Assessment    Outcome Measure Options AM-PAC 6 Clicks Basic Mobility (PT)  -LM        User Key  (r) = Recorded By, (t) = Taken By, (c) = Cosigned By    Initials Name Provider Type    IVAN Cuadra PT Physical Therapist           Time Calculation:         PT Charges       12/11/17 1523          Time Calculation    Start Time 1308  -LM      PT  Received On 12/11/17  -LM      PT Goal Re-Cert Due Date 12/21/17  -LM      Time Calculation- PT    Total Timed Code Minutes- PT 0 minute(s)  -LM        User Key  (r) = Recorded By, (t) = Taken By, (c) = Cosigned By    Initials Name Provider Type    IVAN Cuadra PT Physical Therapist          Therapy Charges for Today     Code Description Service Date Service Provider Modifiers Qty    71641863343 HC PT EVAL MOD COMPLEXITY 4 12/11/2017 Elke Cuadra, PT GP 1    06484944457 HC PT THER SUPP EA 15 MIN 12/11/2017 Elke Cuadra, PT GP 2          PT G-Codes  Outcome Measure Options: AM-PAC 6 Clicks Basic Mobility (PT)      Elke Cuadra PT  12/11/2017

## 2017-12-12 VITALS
SYSTOLIC BLOOD PRESSURE: 115 MMHG | RESPIRATION RATE: 16 BRPM | HEIGHT: 70 IN | TEMPERATURE: 97.6 F | OXYGEN SATURATION: 93 % | DIASTOLIC BLOOD PRESSURE: 59 MMHG | BODY MASS INDEX: 24.78 KG/M2 | HEART RATE: 70 BPM | WEIGHT: 173.06 LBS

## 2017-12-12 LAB
ANION GAP SERPL CALCULATED.3IONS-SCNC: 6 MMOL/L (ref 3–11)
BUN BLD-MCNC: 22 MG/DL (ref 9–23)
BUN/CREAT SERPL: 27.5 (ref 7–25)
CALCIUM SPEC-SCNC: 8.1 MG/DL (ref 8.7–10.4)
CHLORIDE SERPL-SCNC: 109 MMOL/L (ref 99–109)
CO2 SERPL-SCNC: 27 MMOL/L (ref 20–31)
CREAT BLD-MCNC: 0.8 MG/DL (ref 0.6–1.3)
DEPRECATED RDW RBC AUTO: 45.1 FL (ref 37–54)
ERYTHROCYTE [DISTWIDTH] IN BLOOD BY AUTOMATED COUNT: 12.8 % (ref 11.3–14.5)
GFR SERPL CREATININE-BSD FRML MDRD: 96 ML/MIN/1.73
GLUCOSE BLD-MCNC: 111 MG/DL (ref 70–100)
GLUCOSE BLDC GLUCOMTR-MCNC: 109 MG/DL (ref 70–130)
HCT VFR BLD AUTO: 31.6 % (ref 38.9–50.9)
HGB BLD-MCNC: 10.6 G/DL (ref 13.1–17.5)
MCH RBC QN AUTO: 32.2 PG (ref 27–31)
MCHC RBC AUTO-ENTMCNC: 33.5 G/DL (ref 32–36)
MCV RBC AUTO: 96 FL (ref 80–99)
PLATELET # BLD AUTO: 191 10*3/MM3 (ref 150–450)
PMV BLD AUTO: 10 FL (ref 6–12)
POTASSIUM BLD-SCNC: 4.1 MMOL/L (ref 3.5–5.5)
RBC # BLD AUTO: 3.29 10*6/MM3 (ref 4.2–5.76)
SODIUM BLD-SCNC: 142 MMOL/L (ref 132–146)
WBC NRBC COR # BLD: 7.14 10*3/MM3 (ref 3.5–10.8)

## 2017-12-12 PROCEDURE — 85027 COMPLETE CBC AUTOMATED: CPT | Performed by: NURSE PRACTITIONER

## 2017-12-12 PROCEDURE — 97530 THERAPEUTIC ACTIVITIES: CPT

## 2017-12-12 PROCEDURE — 80048 BASIC METABOLIC PNL TOTAL CA: CPT | Performed by: ORTHOPAEDIC SURGERY

## 2017-12-12 PROCEDURE — 97166 OT EVAL MOD COMPLEX 45 MIN: CPT

## 2017-12-12 PROCEDURE — 97116 GAIT TRAINING THERAPY: CPT

## 2017-12-12 PROCEDURE — 82962 GLUCOSE BLOOD TEST: CPT

## 2017-12-12 PROCEDURE — 97110 THERAPEUTIC EXERCISES: CPT

## 2017-12-12 RX ORDER — PSEUDOEPHEDRINE HCL 30 MG
100 TABLET ORAL 2 TIMES DAILY
Qty: 60 CAPSULE | Refills: 0 | Status: SHIPPED | OUTPATIENT
Start: 2017-12-12 | End: 2020-10-01

## 2017-12-12 RX ORDER — OXYCODONE HYDROCHLORIDE AND ACETAMINOPHEN 5; 325 MG/1; MG/1
1 TABLET ORAL EVERY 4 HOURS PRN
Qty: 40 TABLET | Refills: 0 | Status: SHIPPED | OUTPATIENT
Start: 2017-12-12 | End: 2017-12-21

## 2017-12-12 RX ADMIN — MELOXICAM 15 MG: 7.5 TABLET ORAL at 09:22

## 2017-12-12 RX ADMIN — OXYCODONE AND ACETAMINOPHEN 2 TABLET: 5; 325 TABLET ORAL at 00:01

## 2017-12-12 RX ADMIN — OXYCODONE AND ACETAMINOPHEN 2 TABLET: 5; 325 TABLET ORAL at 06:13

## 2017-12-12 RX ADMIN — OXYCODONE AND ACETAMINOPHEN 2 TABLET: 5; 325 TABLET ORAL at 10:43

## 2017-12-12 RX ADMIN — ASPIRIN 325 MG: 325 TABLET, DELAYED RELEASE ORAL at 09:22

## 2017-12-12 RX ADMIN — CEPHALEXIN 500 MG: 250 CAPSULE ORAL at 06:08

## 2017-12-12 RX ADMIN — DOCUSATE SODIUM 100 MG: 100 CAPSULE, LIQUID FILLED ORAL at 09:22

## 2017-12-12 NOTE — DISCHARGE PLACEMENT REQUEST
Prashanth Paz IV (67 y.o. Male)         Mariza Fiore RN Case Manager, [phone]          [address]  [address] [facility] [address]  Phone:  [phone]  Fax:   Date: Dec 12, 2017      Ambulatory Referral to Physical Therapy Evaluate and treat     Patient:  Prashanth Paz IV MRN:  [id number]   [address]  [facility] [address] :  1950  SSN:    Phone: [phone] Sex:  M      INSURANCE PAYOR PLAN GROUP # SUBSCRIBER ID   Primary:  Secondary: MEDICARE  [employer] [place] [id number]  [id number]   [id number]  [id number]      Referring Provider Information:  SAMI GARCIA Phone: [phone] Fax:       Referral Information:   # Visits:  1 Referral Type: Therapy [AE1]   Urgency:  Routine Referral Reason: Specialty Services Required   Start Date: Dec 12, 2017 End Date:  To be determined by Insurer   Diagnosis: Impaired functional mobility, balance, gait, and endurance (Z74.09 [ICD-10-CM] V49.89 [ICD-9-CM])  Status post total replacement of left hip (Z96.642 [ICD-10-CM] V43.64 [ICD-9-CM])      Refer to Dept:   Refer to Provider:   Refer to Facility:  [facility]  Patient will be in the home transitions program.  Specialty modality needed? Evaluate and treat     This document serves as a request of services and does not constitute Insurance authorization or approval of services.  To determine eligibility, please contact the members Insurance carrier to verify and review coverage.     If you have medical questions regarding this request for services. Please contact [address] at [phone] between the hours of 8:00am - 5:00pm (Mon-Fri).             Verbal Order Mode: Per protocol: cosign required  Authorizing Provider: Sami Garcia MD  Authorizing Provider's NPI: [id number]     Order Entered By: Mariza Fiore RN 2017 10:35 AM     Electronically signed by:                     Date of Birth Social Security Number Address Home Phone MRN     "1950  [address]  [facility] [address] [phone] [id number]    Sikh Marital Status          None        Admission Date Admission Type Admitting Provider Attending Provider Department, Room/Bed    12/11/17 Elective Reed Lackey MD [place], [place], MD [address], [address]/1    Discharge Date Discharge Disposition Discharge Destination         Home or Self Care             Attending Provider: Mira Kinney MD     Allergies:  No Known Allergies    Isolation:  None   Infection:  None   Code Status:  FULL    Ht:  177.8 cm (70\")   Wt:  78.5 kg (173 lb 1 oz)    Admission Cmt:  None   Principal Problem:  Status post total replacement of left hip [Z96.642]                 Active Insurance as of 12/11/2017     Primary Coverage     Payor Plan Insurance Group Employer/Plan Group    MEDICARE MEDICARE A & B      Payor Plan Address Payor Plan Phone Number Effective From Effective To    [address] [phone] 11/1/2015     [place] [address]       Subscriber Name Subscriber Birth Date Member ID       LUANN PAZ IV 1950 [id number]           Secondary Coverage     Payor Plan Insurance Group Employer/Plan Group    [employer]      Payor Plan Address Payor Plan Phone Number Effective From Effective To    [employer] [phone] 12/1/2015     [place] [address]       Subscriber Name Subscriber Birth Date Member ID       LUANN PAZ IV 1950 [id number]                 Emergency Contacts      (Rel.) Home Phone Work Phone Mobile Phone    Kristian Paz (Brother) [phone] -- --    Maria L Dolldy (Daughter) -- -- [phone]            Insurance Information                MEDICARE/MEDICARE A & B Phone: [phone]    Subscriber: Luann Paz IV Subscriber#: [id number]    Group#:  Precert#:         [employer] Phone: [phone]    Subscriber: Luann Paz IV Subscriber#: [id number]    Group#:  " Precert#:              History & Physical      JULIA Lester at 12/11/2017  6:49 AM     Attestation signed by Reed Lackey MD at 12/11/2017  7:18 AM        Agreed.  Proceed with left total hip arthroplasty                               Pre-Op H&P  Prashanth Paz IV  [id number]  1950    Date of Service: 12/11/2017    Chief complaint: Left hip, leg and groin pain    HPI  Patient is a 67 y.o.male presents with left groin, leg and hip pain for approximately 2 months     Review of Systems:  General ROS: negative for chills, fever or skin lesions;  No changes since last office visit  Cardiovascular ROS: no chest pain or dyspnea on exertion  Respiratory ROS: no cough, shortness of breath, or wheezing    Allergies: No Known Allergies    Home Meds:    Prescriptions Prior to Admission   Medication Sig Dispense Refill Last Dose   • cephalexin (KEFLEX) 500 MG capsule Take 500 mg by mouth 3 (Three) Times a Day.  1 12/11/2017   • chlorhexidine (HIBICLENS) 4 % external liquid Apply  topically Daily. Shower daily with hibiclens solution as directed for 5 days prior to surgery. 236 mL 0 12/11/2017   • Chlorhexidine Gluconate 4 % solution Shower daily with hibiclens solution as directed for 5 days prior to surgery. 237 mL 0 12/11/2017   • mupirocin (BACTROBAN NASAL) 2 % nasal ointment into each nostril 2 (Two) Times a Day. Apply pea-sized amount to each nostril twice daily for 5 days prior to surgery 22 g 0 12/10/2017   • traMADol (ULTRAM) 50 MG tablet TAKE ONE TABLET BY MOUTH EVERY 6 HOURS 90 tablet 0 12/10/2017       PMH:   Past Medical History:   Diagnosis Date   • Arthritis    • Kidney stone      PSH:    Past Surgical History:   Procedure Laterality Date   • HIP ARTHROPLASTY     • HIP SURGERY Right     [facility]       Immunization History:  Influenza: yes  Pneumococcal: yes  Tetanus: unknown    Social History:   Tobacco:   History   Smoking Status   • Former Smoker   • Packs/day: 1.00   • Years: 10.00   •  Types: Cigarettes   Smokeless Tobacco   • Never Used      Alcohol:     History   Alcohol Use   • Yes     Comment: occasional       Vitals:   17   BP  125/89 110/71   Heart Rate  73 64                  Physical Exam:  General Appearance:    Alert, cooperative, no distress, appears stated age   Head:    Normocephalic, without obvious abnormality, atraumatic   Lungs:     Clear to auscultation bilaterally, respirations unlabored    Heart:   Regular rate and rhythm, S1 and S2 normal, no murmur, rub    or gallop    Abdomen:    Soft, non-tender.  +bowel sounds   Breast Exam:    deferred   Genitalia:    deferred   Extremities:   Extremities normal, atraumatic, no cyanosis or edema   Skin:   Skin color, texture, turgor normal, no rashes or lesions   Neurologic:   Grossly intact   Results Review  I reviewed the patient's new clinical results.    Cancer Staging (if applicable)  Cancer Patient: __ yes __no __unknown; If yes, clinical stage T:__ N:__M:__, stage group or __N/A    Impression: Pleasant 67 year old gentleman who presents with left hip osteoarthritis    Plan: Left total hip arthroplasty    JULIA Lester   2017   6:51 AM     Electronically signed by Reed Lackey MD at 2017  7:18 AM      Mira Kinney MD at 2017 10:29 AM          Patient Name: Prashanth Paz IV  MRN: [id number]  : 1950  DOS: 2017    Attending: Reed Lackey MD    Primary Care Provider: Stephen oLu MD      Chief complaint:  Left hip, leg and groin pain    Subjective   Patient is a 67 y.o. male presented for left total hip arthroplasty by Dr. Lackey under spinal anesthesia. He tolerated surgery well and is admitted for further medical management. His hip has been severely painful for the last 3 months; he was having difficulty getting out of the car. He denies use of assistive device for ambulation.     He had previous right hip replacement about 3 years ago. Following that  surgery he became septic and required a lengthy stay at [facility]. He is on lifetime suppression therapy with Keflex.    When seen postop he is doing well. His pain is well controlled. He denies nausea, shortness of breath or chest pain. No hx of DVT or PE.    ( Above is noted, agreed.  Seen in his room afterwards.  He is doing very well.  His pain control is adequate and he has already ambulated twice.  Already talking about possibly going home tomorrow.  No nausea or vomiting no shortness breath.)wy    Allergies:  No Known Allergies    Meds:  Prescriptions Prior to Admission   Medication Sig Dispense Refill Last Dose   • cephalexin (KEFLEX) 500 MG capsule Take 500 mg by mouth 3 (Three) Times a Day.  1 12/11/2017   • chlorhexidine (HIBICLENS) 4 % external liquid Apply  topically Daily. Shower daily with hibiclens solution as directed for 5 days prior to surgery. 236 mL 0 12/11/2017   • Chlorhexidine Gluconate 4 % solution Shower daily with hibiclens solution as directed for 5 days prior to surgery. 237 mL 0 12/11/2017   • mupirocin (BACTROBAN NASAL) 2 % nasal ointment into each nostril 2 (Two) Times a Day. Apply pea-sized amount to each nostril twice daily for 5 days prior to surgery 22 g 0 12/10/2017   • traMADol (ULTRAM) 50 MG tablet TAKE ONE TABLET BY MOUTH EVERY 6 HOURS 90 tablet 0 12/10/2017       History:   Past Medical History:   Diagnosis Date   • Arthritis    • Kidney stone      Past Surgical History:   Procedure Laterality Date   • HIP ARTHROPLASTY     • HIP SURGERY Right     [facility]     Family History   Problem Relation Age of Onset   • Cancer Mother    • Cancer Father      Social History   Substance Use Topics   • Smoking status: Former Smoker     Packs/day: 1.00     Years: 10.00     Types: Cigarettes   • Smokeless tobacco: Never Used   • Alcohol use Yes      Comment: occasional   He is  with 3 children. He is retired from the state.    Review of Systems  All systems were reviewed  "and negative except for:  Gastrointestinal: postitive for  constipation    Vital Signs  /65  Pulse 70  Temp 98.7 °F (37.1 °C) (Oral)   Resp 16  Ht 177.8 cm (70\")  Wt 78.5 kg (173 lb 1 oz)  SpO2 94%  BMI 24.83 kg/m2    Physical Exam:    General Appearance:    Alert, cooperative, in no acute distress   Head:    Normocephalic, without obvious abnormality, atraumatic   Eyes:            Lids and lashes normal, conjunctivae and sclerae normal, no   icterus, no pallor, corneas clear    Ears:    Ears appear intact with no abnormalities noted   Neck:   No adenopathy, supple, trachea midline, no thyromegaly, no     carotid bruit, no JVD   Lungs:     Clear to auscultation,respirations regular, even and                   unlabored    Heart:    Regular rhythm and normal rate, normal S1 and S2, no            Murmur    Abdomen:     Normal bowel sounds, no masses, no organomegaly, soft        non-tender, non-distended, no guarding, no rebound                 tenderness   Genitalia:    Deferred   Extremities:   Left hip Aquacel CDI. Adductor pillow in place   Pulses:   Pulses palpable and equal bilaterally   Skin:   No bleeding, bruising or rash   Neurologic:   Cranial nerves 2 - 12 grossly intact, sensation intact. Flexion and dorsiflexion intact bilateral feet.        I reviewed the patient's new clinical results.     Results for LUANN STARKS IV (MRN [id number]) as of 12/11/2017 10:31   Ref. Range 11/30/2017 15:15   Glucose Latest Ref Range: 70 - 100 mg/dL 106 (H)   Sodium Latest Ref Range: 132 - 146 mmol/L 141   Potassium Latest Ref Range: 3.5 - 5.5 mmol/L 4.5   CO2 Latest Ref Range: 20.0 - 31.0 mmol/L 27.0   Chloride Latest Ref Range: 99 - 109 mmol/L 107   Anion Gap Latest Ref Range: 3.0 - 11.0 mmol/L 7.0   Creatinine Latest Ref Range: 0.60 - 1.30 mg/dL 0.80   BUN Latest Ref Range: 9 - 23 mg/dL 20   BUN/Creatinine Ratio Latest Ref Range: 7.0 - 25.0  25.0   Calcium Latest Ref Range: 8.7 - 10.4 mg/dL 9.2   eGFR Non "  Amer Latest Ref Range: >60 mL/min/1.73 96   Alkaline Phosphatase Latest Ref Range: 25 - 100 U/L 76   Total Protein Latest Ref Range: 5.7 - 8.2 g/dL 6.0   ALT (SGPT) Latest Ref Range: 7 - 40 U/L 31   AST (SGOT) Latest Ref Range: 0 - 33 U/L 28   Total Bilirubin Latest Ref Range: 0.3 - 1.2 mg/dL 0.3   Albumin Latest Ref Range: 3.20 - 4.80 g/dL 4.00   Globulin Latest Units: gm/dL 2.0   A/G Ratio Latest Ref Range: 1.5 - 2.5 g/dL 2.0   Hemoglobin A1C Latest Ref Range: 4.80 - 5.60 % 5.70 (H)   Protime Latest Ref Range: 9.6 - 11.5 Seconds 10.3   INR Unknown 0.95   aPTT Latest Ref Range: 24.0 - 31.0 seconds 28.4   WBC Latest Ref Range: 3.50 - 10.80 10*3/mm3 5.15   RBC Latest Ref Range: 4.20 - 5.76 10*6/mm3 4.30   Hemoglobin Latest Ref Range: 13.1 - 17.5 g/dL 13.9   Hematocrit Latest Ref Range: 38.9 - 50.9 % 40.9   RDW Latest Ref Range: 11.3 - 14.5 % 12.8   MCV Latest Ref Range: 80.0 - 99.0 fL 95.1   MCH Latest Ref Range: 27.0 - 31.0 pg 32.3 (H)   MCHC Latest Ref Range: 32.0 - 36.0 g/dL 34.0   MPV Latest Ref Range: 6.0 - 12.0 fL 10.0   Platelets Latest Ref Range: 150 - 450 10*3/mm3 252     Assessment and Plan:   Principal Problem:    Status post total replacement of left hip  Active Problems:    Primary osteoarthritis of left hip    Prediabetes      Plan  1. PT/OT- early ambulation post op  2. Pain control-prns   3. IS-encourage  4. DVT proph- mechs/ASA  5. Bowel regimen  6. Resume home medications as appropriate  7. Monitor post-op labs  8. DC planning for home. Probably tomorrow.wy    Continue Keflex per Dr. Darya Kee( discussed with pt Dx, implications, plan(wy)  - hgb A1c on 11/30/17 5.7  - Accuchecks ACHS with low dose SSI  - DM educator consult    Seen and examined by me. Agree with above. Discussed with patient.    Mira Kinney MD  12/11/17  5:39 PM     Electronically signed by Mira Kinney MD at 12/11/2017  5:41 PM           Operative/Procedure Notes (last 72 hours) (Notes from 12/9/2017  10:36 AM through 12/12/2017 10:36 AM)      Reed Lackey MD at 12/11/2017  9:57 AM  Version 1 of 1         TOTAL HIP ARTHROPLASTY  Progress Note    Prashanth Paz FARAZ  12/11/2017    Pre-op Diagnosis:   Primary osteoarthritis of left hip [M16.12]       Post-Op Diagnosis Codes:     * Primary osteoarthritis of left hip [M16.12]    Procedure/CPT® Codes:  OR TOTAL HIP ARTHROPLASTY [64196]    Procedure(s):  TOTAL HIP ARTHROPLASTY LEFT    Surgeon(s):  Reed Lackey MD    Anesthesia: Spinal    Staff:   Circulator: Ilana Pacheco RN  Scrub Person: Miles Wynn  Vendor Representative: Harjinder Herman  Nursing Assistant: Dilip Bright  Assistant: Fabienne Robledo PA-C    Estimated Blood Loss: 250ml    Urine Voided: * No values recorded between 12/11/2017  7:37 AM and 12/11/2017  9:57 AM *    Specimens:                A: Femoral head      Drains:           Findings: End-stage osteoarthritis left hip    Complications: None apparent      Reed Lackey MD     Date: 12/11/2017  Time: 9:57 AM         Electronically signed by Reed Lackey MD at 12/11/2017  9:57 AM      Reed Lackey MD at 12/11/2017  9:57 AM  Version 1 of 1         OPERATIVE REPORT     DATE OF PROCEDURE: 12/11/17     SURGEON: Reed Lackey M.D.     ASSISTANT(S): Circulator: Ilana Pacheco RN  Scrub Person: Miles Wynn  Vendor Representative: Harjinder Herman  Nursing Assistant: Dilip Bright  Assistant: Fabienne Robledo PA-C    Note-PA was utilized during the case to facilitate positioning the patient, exposure, retraction, placement of final components and definitive closure.      PREOPERATIVE DIAGNOSIS: Advanced degenerative joint disease of the left hip secondary to osteoarthritis     POSTOPERATIVE DIAGNOSIS: same     PROCEDURE: Left Total Hip Arthroplasty     SURGICAL DETAILS:     APPROACH: Posterior     ANESTHESIA: Spinal plus local periarticular block     PREOPERATIVE ANTIBIOTICS: Ancef 2 g IV     TRANEXAMIC ACID: IV      ESTIMATED BLOOD LOSS: 250 cc     SPECIMENS: Femoral head     IMPLANTS:   : Cleve   Acetabular component: 52 mm Trident   Acetabular screws: 2   Acetabular liner: 0°  X3   Femoral component: Accolade  degree size 6   Femoral head: 36+0 mm delta Biolox Ceramic     DRAINS: None     LOCAL INJECTION: 1 cc Toradol 30mg/ml, 4 cc duramorph 2mg/ml, 20 cc 0.5% ropivicaine, 20 cc 0.5% lidocaine with 1:200,000 epinephrine, 15 cc preservative free normal saline     MODIFIER(S): None     COMPLICATIONS: None apparent     INDICATIONS FOR PROCEDURE: This patient has a history of progressive left hip pain and arthritis. The hip pain is severe with activity and has progressed significantly. Non-operative treatment has been attempted, but has not improved or controlled symptoms during normal daily activities. Motion has become limited and rotation severely restricted. X-rays reveal moderate-to-severe eburnation of articular cartilage on the superior weight bearing surface of the hip with circumferential acetabular and femoral neck osteophytes consistent with advanced hip osteoarthritis. A total hip arthroplasty was recommended at this time. The risks, benefits, alternatives, and potential complications of the arthroplasty surgery were discussed with the patient in detail to include but not limited to infection, bleeding, anesthesia risks, sciatic nerve palsy, instability/dislocation, limb length discrepancy, aseptic loosening, osteolysis, blood clots, continued pain, iatrogenic fracture, myocardial infarction, stroke, and death. Specific details of the procedure, hospitalization, recovery, rehabilitation, and long-term precautions were also provided. Pre-operative teaching was provided. Implant/prosthesis selection was outlined, and the many options available were explained; the final choice will be made at the time of the procedure to match the anatomy and condition of the bone, ligaments, tendons, and muscles.  Understanding of all topics was conveyed to me by the patient, and consent was given to proceed with a left total hip arthroplasty. The patient completed preoperative medical optimization and risk assessment, joint arthroplasty education, and MRSA decolonization with Mupirocin if indicated based on the preoperative nasal screen. Perioperative blood management and the potential for blood transfusion were discussed with risks and options clearly outlined.     INTRAOPERATIVE FINDINGS: End-stage osteoarthritis left hip with full-thickness cartilage loss the weightbearing portions of femoral head and acetabulum     PROCEDURE: The patient was identified in the preoperative holding area. The operative site was confirmed and marked. SEAMUS hose and a sequential compression device were placed on the nonoperative leg. The risks, benefits, and alternatives to surgery were again confirmed with the patient and the patient wished to proceed. The patient was brought to the operating room and placed on the operating room table in the supine position. A huddle was performed with the patient and all vital surgical team members to confirm the correct operative site, procedure, anesthesia type, and operative plan with the patient. After anesthesia was performed, the patient was positioned in the lateral decubitus position on the pegboard and secured with the operative side up. An axillary roll was placed in the axilla and all bony prominences and pressure points were checked and padded. A relative leg length assessment was carried out and markers were placed for intraoperative assessment. Intravenous antibiotic prophylaxis was given and confirmed with the anesthesia team.     The operative leg was prepped and draped in the usual sterile fashion. A surgical time out was performed immediately preceding the incision with all personnel in the operating room to again confirm patient identity, the correct operative site and extremity, correct  radiographic studies, availability of appropriate surgical equipment and agreement on the planned procedure. A posterolateral approach to the hip was performed through an incision centered over the greater trochanter. The incision was carried through the subcutaneous tissue to the underlying fascia dina and gluteus yelena fascia, which were incised and split posteriorly over the trochanter in the direction of the fibers. Hemostasis was obtained with electrocautery. The Charnley retractor was placed after carefully palpating the sciatic nerve which was protected throughout the case.     A standard posterior approach to the hip was performed by releasing the piriformis, short external rotators and posterior capsule and reflecting them posteriorly as a rectangular flap. The superior capsule was scarred down; it was released and excised. The labrum was split and the femoral head mobilized. The hip was then flexed, internally rotated and dislocated from the acetabulum without excessive force. Assessment of the femoral head revealed eburnation of the articular cartilage with complete loss of the weight bearing chondral surface. Osteophytes were present as well. Careful measurements were performed using the center of the femoral head and the lesser trochanter as markers and a femoral neck cut was made according to the preoperative plan.     Attention was then turned to the acetabulum. Retractors were placed circumferentially for wide acetabular exposure. The labrum and osteophytes were debrided from the rim, and the medial wall was identified and the depth of the socket assessed by excising the pulvinar. Bleeders were controlled, especially the area of the obturator artery with the electrocautery. Acetabular reaming was then started with the hemispherical instrument matching the size of the excised femoral head. Sequential reaming of the acetabulum was then performed by increasing size in 2 mm increments, underreaming by  1 mm. The reamers created an excellent hemispherical bed of bleeding cancellous bone. The cup was impacted into position, targeting 40-45 degrees of abduction and 20-25 degrees of anteversion, with an excellent 1 mm press-fit. The press-fit was firm, stable, and apically seated. 2 screw(s) were used for additional support of the fixation. Further osteophyte debridement was done around the socket. All impinging soft tissue was removed from the edges of the socket. The polyethylene bearing/liner was then impacted into place and checked for stability.     Attention was then turned to the femur. The leg was positioned so access did not result in soft-tissue injury. The femoral preparation was started with a box osteotome. The medullary cavity of the femur was then entered and opened with hand reamers. Femoral stem broaches were then employed in an incremental fashion up to the final size, targeting 15-20 degrees of anteversion. The final broach was fully seated, had good rotational and axial stability, and was seated at the appropriate height in relation to the greater trochanter and the preoperative plan. Trial reduction was done. Excellent stability and range of motion was achieved without impingement at any position. The hip was stable in full extension and external rotation as well as in flexion past 90 degrees, 20 degrees adduction and 60-70 degrees of internal rotation. Leg lengths were re-created within millimeters based on the markers and relative measurement. The hip was then dislocated and the trials removed. The wound was copiously irrigated, and the permanent femoral stem was then impacted down in approximately 15-20 degrees of anteversion. The press-fit was firm, and stable to axial and rotational force in all planes. The permanent femoral head was then impacted on the clean trunnion. The socket and wound were irrigated, suctioned, and inspected for debris. The final reduction was performed, and again leg  length assessment and stability assessment of the hip were performed to confirm optimal component selection and stability in all planes without impingement when stressed to the extremes.     The wound was irrigated with dilute betadine solution as well as saline, and hemostasis obtained with electrocautery. A pain cocktail was injected into the pericapsular tissues. The posterior capsule, piriformis, and short external rotators were repaired utilizing #2 Ticron through drill holes in the greater trochanter. The sciatic nerve was palpated and found to be intact and the wound was irrigated. Instrument and sponge counts were completed and confirmed correct. The fascia dina and gluteal fascia were closed with interrupted #1 Vicryl suture and oversewn with #2 Stratafix. The deep subcutaneous tissue was closed with running #1 Stratafix suture and the superficial subcutaneous tissue with interrupted 2-0 Vicryl suture. A 3-0 monocryl running stitch was used to close skin followed by skin glue adhesive to seal the wound. A silver impregnated dressing was then placed, followed by SEAMUS hose and a sequential compression device to the operative limb, followed by an abduction pillow. The patient was then returned to a supine position on the operating room table. The patient was sufficiently recovered from anesthesia, transferred to a hospital bed and taken to the PACU in stable condition.     One weight-based dose (10 mg/kg) of intravenous tranexamic acid was administered prior to incision. A second 10mg/kg intravenous dose was given prior to wound closure.     No apparent complications occurred during the procedure Instrument, sponge and needle counts were correct x 2.     The patient underwent risk stratification preoperatively and aspirin was chosen for DVT prophylaxis. Delay in starting chemical prophylaxis for 23 hours from surgical incision was over concerns for hematoma formation and wound related issues.     POST OPERATIVE  PLAN:   Protected weight bearing as tolerated   Posterior hip precautions x 6 weeks   PT/OT for mobilization and medical equipment needs   23 hours perioperative antibiotic prophylaxis   Pain control with PO/IV meds   Keep silver dressing in place for 7 days post op. Change dressing only if saturated.   SEAMUS motte and SCD's to bilateral lower extremities   Social work for discharge planning needs   Follow up in 3 weeks for post operative wound check with XR AP pelvis.       Electronically signed by Reed Lackey MD at 12/11/2017 10:00 AM           Physical Therapy Notes (last 72 hours) (Notes from 12/9/2017 10:36 AM through 12/12/2017 10:36 AM)      Elke Cuadra, PT at 12/11/2017  3:22 PM  Version 1 of 1         Problem: Patient Care Overview (Adult)  Goal: Plan of Care Review  Outcome: Ongoing (interventions implemented as appropriate)    12/11/17 1521   Coping/Psychosocial Response Interventions   Plan Of Care Reviewed With patient;family   Patient Care Overview   Progress progress toward functional goals as expected   Outcome Evaluation   Outcome Summary/Follow up Plan PT eval complete. Pt presents with decreased functional independence warranting further need for skilled PT services. Pt ambulated 160 feet with CGA and RW, limited by fatigue. PT will follow to improve strength, balance, and functional mobility. Will initiate stair training next visit for safe d/c home. Recommend d/c home with assist and HHPT.          Problem: Inpatient Physical Therapy  Goal: Bed Mobility Goal LTG- PT  Outcome: Ongoing (interventions implemented as appropriate)    12/11/17 1521   Bed Mobility PT LTG   Bed Mobility PT LTG, Date Established 12/11/17   Bed Mobility PT LTG, Time to Achieve 3 days   Bed Mobility PT LTG, Activity Type supine to sit/sit to supine   Bed Mobility PT LTG, [place] Level independent       Goal: Transfer Training Goal 1 LTG- PT  Outcome: Ongoing (interventions implemented as appropriate)     17 1521   Transfer Training PT LTG   Transfer Training PT LTG, Date Established 17   Transfer Training PT LTG, Time to Achieve 3 days   Transfer Training PT LTG, Activity Type sit to stand/stand to sit   Transfer Training PT LTG, [place] Level conditional independence   Transfer Training PT LTG, Assist Device walker, rolling       Goal: Gait Training Goal LTG- PT  Outcome: Ongoing (interventions implemented as appropriate)    17 152   Gait Training PT LTG   Gait Training Goal PT LTG, Date Established 17   Gait Training Goal PT LTG, Time to Achieve 3 days   Gait Training Goal PT LTG, [place] Level conditional independence   Gait Training Goal PT LTG, Assist Device walker, rolling   Gait Training Goal PT LTG, Distance to Achieve 500 feet       Goal: Stair Training Goal STG- PT  Outcome: Ongoing (interventions implemented as appropriate)    17 152   Stair Training PT STG   Stair Training Goal PT STG, Date Established 17   Stair Training Goal PT STG, Time to Achieve 3 days   Stair Training Goal PT STG, Number of Steps 1   Stair Training Goal PT STG, [place] Level conditional independence   Stair Training Goal PT STG, Assist Device walker, rolling       Goal: Stair Training Goal LTG- PT  Outcome: Ongoing (interventions implemented as appropriate)    17 152   Stair Training PT LTG   Stair Training Goal PT LTG, Date Established 17   Stair Training Goal PT LTG, Time to Achieve 3 days   Stair Training Goal PT LTG, Number of Steps 14   Stair Training Goal PT LTG, [place] Level supervision required   Stair Training Goal PT LTG, Assist Device 1 handrail              Electronically signed by Elke Cuadra PT at 2017  3:23 PM      Elke Cuadra PT at 2017  3:24 PM  Version 2 of 2         Acute Care - Physical Therapy Initial Evaluation  [facility]     Patient Name: Prashanth Paz IV  : 1950  MRN: [id number]  Today's Date:  12/11/2017   Onset of Illness/Injury or Date of Surgery Date: 12/11/17  Date of Referral to PT: 12/11/17  Referring Physician: MD Darya      Admit Date: 12/11/2017     Visit Dx:    ICD-10-CM ICD-9-CM   1. Impaired functional mobility, balance, gait, and endurance Z74.09 V49.89   2. Primary osteoarthritis of left hip M16.12 715.15     Patient Active Problem List   Diagnosis   • Primary osteoarthritis of left hip   • Status post total replacement of left hip   • Prediabetes     Past Medical History:   Diagnosis Date   • Arthritis    • Kidney stone      Past Surgical History:   Procedure Laterality Date   • HIP ARTHROPLASTY     • HIP SURGERY Right     [facility]          PT ASSESSMENT (last 72 hours)      PT Evaluation       12/11/17 1308 12/11/17 0707    Rehab Evaluation    Document Type evaluation  -LM     Subjective Information agree to therapy;complains of;pain  -LM     Patient Effort, Rehab Treatment good  -LM     Symptoms Noted During/After Treatment none  -LM     General Information    Patient Profile Review yes  -LM     Onset of Illness/Injury or Date of Surgery Date 12/11/17  -LM     Referring Physician MD Darya  -LM     General Observations Pt received supine in bed with O2 via NC, IV intact, family at bedside.  -LM     Pertinent History Of Current Problem Pt presents for surgical management of left hip, groin and leg pain that failed to improve with conservative management. 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  -LM     Precautions/Limitations hip precautions- left;fall precautions  -LM     Prior Level of Function min assist:;all household mobility;community mobility;gait;transfer;bed mobility;ADL's  -LM     Equipment Currently Used at Home walker, rolling;shower chair;grab bar;raised toilet;cane, quad  -LM     Plans/Goals Discussed With patient and family;agreed upon  -LM     Risks Reviewed patient and  family:;LOB;nausea/vomiting;dizziness;increased discomfort;change in vital signs  -LM     Benefits Reviewed patient and family:;improve function;increase independence;increase strength;increase balance;decrease pain  -LM     Barriers to Rehab none identified  -LM     Living Environment    Lives With spouse  -LM spouse  -MY    Living Arrangements house  -LM house  -MY    Home Accessibility bed and bath are not on the first floor;stairs to enter home;stairs within home   walk-in shower  -LM no concerns  -MY    Number of Stairs to Enter Home 1  -LM     Number of Stairs Within Home 14  -LM     Stair Railings at Home inside, present on right side   no handrails outside  -LM     Type of Financial/Environmental Concern  none  -MY    Transportation Available  car;family or friend will provide  -MY    Living Environment Comment Pt lives in 2-story home with bed/bath on second floor. Daughter will be staying with patient to assist as needed.  -LM     Clinical Impression    Date of Referral to PT 12/11/17  -LM     PT Diagnosis s/p elective left SALVADOR  -LM     Prognosis good  -LM     Patient/Family Goals Statement return home  -LM     Criteria for Skilled Therapeutic Interventions Met yes;treatment indicated  -LM     Rehab Potential good, to achieve stated therapy goals  -LM     Pain Assessment    Pain Assessment 0-10  -LM     Pain Score 2  -LM     Post Pain Score 2  -LM     Pain Type Acute pain  -LM     Pain Location Hip  -LM     Pain Orientation Left  -LM     Pain Intervention(s) Repositioned;Ambulation/increased activity  -LM     Response to Interventions tolerated  -LM     Vision Assessment/Intervention    Visual Impairment WFL  -LM     Cognitive Assessment/Intervention    Current Cognitive/Communication Assessment functional  -LM     Orientation Status oriented x 4  -LM     Follows Commands/Answers Questions 100% of the time;able to follow single-step instructions;needs cueing  -LM     Personal Safety WNL/WFL  -LM      Personal Safety Interventions fall prevention program maintained;gait belt;nonskid shoes/slippers when out of bed;supervised activity  -LM     ROM (Range of Motion)    General ROM lower extremity range of motion deficits identified   defer UE to OT  -LM     General LE Assessment    ROM hip, left: LE ROM deficit  -LM     ROM Detail left hip ROM impaired 25%  -LM     MMT (Manual Muscle Testing)    General MMT Assessment lower extremity strength deficits identified   defer UE to OT  -LM     General MMT Assessment Detail left LE not formally tested; right LE functionally 4+/5  -LM     Lower Extremity    Lower Ext Manual Muscle Testing --  -LM     Mobility Assessment/Training    Extremity Weight-Bearing Status left lower extremity  -LM     Left Lower Extremity Weight-Bearing weight-bearing as tolerated  -LM     Bed Mobility, Assessment/Treatment    Bed Mobility, Assistive Device head of bed elevated  -LM     Bed Mob, Supine to Sit, [place] supervision required;verbal cues required  -LM     Bed Mob, Sit to Supine, [place] not tested   Pt left UIC  -LM     Bed Mobility, Safety Issues decreased use of legs for bridging/pushing  -LM     Bed Mobility, Impairments strength decreased;pain  -LM     Bed Mobility, Comment Verbal cues for sequencing and to adhere to hip precautions. Pt able to independently raise trunk to sitting and then use bilateral UEs to move body towards EOB. Pt denied dizziness upon sitting EOB.   -LM     Transfer Assessment/Treatment    Transfers, Sit-Stand [place] contact guard assist;2 person assist required;verbal cues required  -LM     Transfers, Stand-Sit [place] contact guard assist;2 person assist required;verbal cues required  -LM     Transfers, Sit-Stand-Sit, Assist Device rolling walker  -LM     Transfer, Safety Issues sequencing ability decreased;step length decreased;weight-shifting ability decreased  -LM     Transfer, Impairments ROM decreased;strength decreased;pain  -LM      Transfer, Comment Verbal cues to push from bed with at least one hand when standing, reach for chair armrests when sitting, step out left LE prior to t/f. Pt denied dizziness upon standing.  -LM     Gait Assessment/Treatment    Gait, [place] Level contact guard assist;1 person + 1 person to manage equipment  -LM     Gait, Assistive Device rolling walker  -LM     Gait, Distance (Feet) 160  -LM     Gait, Gait Pattern Analysis swing-through gait  -LM     Gait, Gait Deviations left:;dominique decreased;decreased heel strike;forward flexed posture;step length decreased;weight-shifting ability decreased  -LM     Gait, Safety Issues sequencing ability decreased;step length decreased;weight-shifting ability decreased  -LM     Gait, Impairments ROM decreased;strength decreased;pain  -LM     Gait, Comment Pt ambulated with step-through pattern at slow pace. Initially, pt exhibited increased bilateral UE weight bearing to off-load left LE during turns. Verbal cues to increase left LE weight-bearing, decrease bilateral UE weight bearing, upright posture, and increase step length. Gait limited by fatigue.  -LM     Motor Skills/Interventions    Additional Documentation Balance Skills Training (Group)  -LM     Balance Skills Training    Sitting-Level of Assistance Independent  -LM     Sitting-Balance Support Right upper extremity supported;Left upper extremity supported;Feet supported  -LM     Standing-Level of Assistance Contact guard  -LM     Static Standing Balance Support assistive device  -LM     Gait Balance-Level of Assistance Contact guard  -LM     Gait Balance Support assistive device  -LM     Therapy Exercises    Exercise Protocols total hip  -LM     Total Hip Exercises left:;10 reps;ankle pumps/circles;quad set;glut set;LAQ  -LM     Sensory Assessment/Intervention    Sensory Impairment --   Pt denies n/t in bilateral LEs; able to actively DF L ankle  -LM     Positioning and Restraints    Pre-Treatment Position in  bed  -LM     Post Treatment Position chair  -LM     In Chair notified nsg;reclined;sitting;call light within reach;encouraged to call for assist;exit alarm on;with family/caregiver;legs elevated  -LM       User Key  (r) = Recorded By, (t) = Taken By, (c) = Cosigned By    Initials Name Provider Type    MY Mojgan EMERSON Andino RN Registered Nurse     Elke Cuadra PT Physical Therapist          Physical Therapy Education     Title: PT OT SLP Therapies (Active)     Topic: Physical Therapy (Active)     Point: Mobility training (Active)    Learning Progress Summary    Learner Readiness Method Response Comment Documented by Status   Patient Acceptance E,D NR Reviewed benefits of activity, hip precautions, WB status, correct gait mechanics.  12/11/17 1520 Active   Family Acceptance E,D NR Reviewed benefits of activity, hip precautions, WB status, correct gait mechanics.  12/11/17 1520 Active               Point: Body mechanics (Active)    Learning Progress Summary    Learner Readiness Method Response Comment Documented by Status   Patient Acceptance E,D NR Reviewed benefits of activity, hip precautions, WB status, correct gait mechanics.  12/11/17 1520 Active   Family Acceptance E,D NR Reviewed benefits of activity, hip precautions, WB status, correct gait mechanics.  12/11/17 1520 Active               Point: Precautions (Active)    Learning Progress Summary    Learner Readiness Method Response Comment Documented by Status   Patient Acceptance E,D NR Reviewed benefits of activity, hip precautions, WB status, correct gait mechanics.  12/11/17 1520 Active   Family Acceptance E,D NR Reviewed benefits of activity, hip precautions, WB status, correct gait mechanics.  12/11/17 1520 Active                      User Key     Initials Effective Dates Name Provider Type Discipline     06/09/17 -  Elke Cuadra PT Physical Therapist PT                PT Recommendation and Plan  Anticipated Equipment Needs At  Discharge: other (see comments) (none)  Anticipated Discharge Disposition: home with assist, home with home health  Demonstrates Need for Referral to Another Service: home health care  Planned Therapy Interventions: balance training, bed mobility training, gait training, home exercise program, patient/family education, stair training, strengthening, transfer training  PT Frequency: 2 times/day  Plan of Care Review  Plan Of Care Reviewed With: patient, family  Progress: progress toward functional goals as expected  Outcome Summary/Follow up Plan: PT eval complete. Pt presents with decreased functional independence warranting further need for skilled PT services. Pt ambulated 160 feet with CGA and RW, limited by fatigue. PT will follow to improve strength, balance, and functional mobility. Will initiate stair training next visit for safe d/c home. Recommend d/c home with assist and HHPT.           IP PT Goals       12/11/17 1521          Bed Mobility PT LTG    Bed Mobility PT LTG, Date Established 12/11/17  -LM      Bed Mobility PT LTG, Time to Achieve 3 days  -LM      Bed Mobility PT LTG, Activity Type supine to sit/sit to supine  -LM      Bed Mobility PT LTG, [place] Level independent  -LM      Transfer Training PT LTG    Transfer Training PT LTG, Date Established 12/11/17  -LM      Transfer Training PT LTG, Time to Achieve 3 days  -LM      Transfer Training PT LTG, Activity Type sit to stand/stand to sit  -LM      Transfer Training PT LTG, [place] Level conditional independence  -LM      Transfer Training PT LTG, Assist Device walker, rolling  -LM      Gait Training PT LTG    Gait Training Goal PT LTG, Date Established 12/11/17  -LM      Gait Training Goal PT LTG, Time to Achieve 3 days  -LM      Gait Training Goal PT LTG, [place] Level conditional independence  -LM      Gait Training Goal PT LTG, Assist Device walker, rolling  -LM      Gait Training Goal PT LTG, Distance to Achieve 500 feet  -LM       Stair Training PT STG    Stair Training Goal PT STG, Date Established 12/11/17  -LM      Stair Training Goal PT STG, Time to Achieve 3 days  -LM      Stair Training Goal PT STG, Number of Steps 1  -LM      Stair Training Goal PT STG, [place] Level conditional independence  -LM      Stair Training Goal PT STG, Assist Device walker, rolling  -LM      Stair Training PT LTG    Stair Training Goal PT LTG, Date Established 12/11/17  -LM      Stair Training Goal PT LTG, Time to Achieve 3 days  -LM      Stair Training Goal PT LTG, Number of Steps 14  -LM      Stair Training Goal PT LTG, [place] Level supervision required  -LM      Stair Training Goal PT LTG, Assist Device 1 handrail  -LM        User Key  (r) = Recorded By, (t) = Taken By, (c) = Cosigned By    Initials Name Provider Type    IVAN Cuadra PT Physical Therapist                Outcome Measures       12/11/17 1308          How much help from another person do you currently need...    Turning from your back to your side while in flat bed without using bedrails? 3  -LM      Moving from lying on back to sitting on the side of a flat bed without bedrails? 3  -LM      Moving to and from a bed to a chair (including a wheelchair)? 3  -LM      Standing up from a chair using your arms (e.g., wheelchair, bedside chair)? 3  -LM      Climbing 3-5 steps with a railing? 3  -LM      To walk in hospital room? 3  -LM      AM-PAC 6 Clicks Score 18  -LM      Functional Assessment    Outcome Measure Options AM-PAC 6 Clicks Basic Mobility (PT)  -LM        User Key  (r) = Recorded By, (t) = Taken By, (c) = Cosigned By    Initials Name Provider Type    IVAN Cuadra PT Physical Therapist           Time Calculation:         PT Charges       12/11/17 1523          Time Calculation    Start Time 1308  -LM      PT Received On 12/11/17  -LM      PT Goal Re-Cert Due Date 12/21/17  -LM      Time Calculation- PT    Total Timed Code Minutes- PT 0 minute(s)  -LM         User Key  (r) = Recorded By, (t) = Taken By, (c) = Cosigned By    Initials Name Provider Type    LM Elke Cuadra PT Physical Therapist          Therapy Charges for Today     Code Description Service Date Service Provider Modifiers Qty    83275919567 HC PT EVAL MOD COMPLEXITY 4 2017 Elke Cuadra PT GP 1    39440059902 HC PT THER SUPP EA 15 MIN 2017 Elke Cuadra PT GP 2          PT G-Codes  Outcome Measure Options: AM-PAC 6 Clicks Basic Mobility (PT)      Elke Cuadra PT  2017            Electronically signed by Elke Cuadra PT at 2017  3:26 PM      Elke Cuadra PT at 2017  3:24 PM  Version 1 of 2         Acute Care - Physical Therapy Initial Evaluation  [facility]     Patient Name: Prashanth Paz IV  : 1950  MRN: [id number]  Today's Date: 2017   Onset of Illness/Injury or Date of Surgery Date: 17  Date of Referral to PT: 17  Referring Physician: MD Darya      Admit Date: 2017     Visit Dx:    ICD-10-CM ICD-9-CM   1. Impaired functional mobility, balance, gait, and endurance Z74.09 V49.89   2. Primary osteoarthritis of left hip M16.12 715.15     Patient Active Problem List   Diagnosis   • Primary osteoarthritis of left hip   • Status post total replacement of left hip   • Prediabetes     Past Medical History:   Diagnosis Date   • Arthritis    • Kidney stone      Past Surgical History:   Procedure Laterality Date   • HIP ARTHROPLASTY     • HIP SURGERY Right     [facility]          PT ASSESSMENT (last 72 hours)      PT Evaluation       17 1308 17 0707    Rehab Evaluation    Document Type evaluation  -LM     Subjective Information agree to therapy;complains of;pain  -LM     Patient Effort, Rehab Treatment good  -LM     Symptoms Noted During/After Treatment none  -LM     General Information    Patient Profile Review yes  -LM     Onset of Illness/Injury or Date of Surgery Date 17  -LM     Referring Physician  MD Darya  -LM     General Observations Pt received supine in bed with O2 via NC, IV intact, family at bedside.  -LM     Pertinent History Of Current Problem Pt presents for surgical management of left hip, groin and leg pain that failed to improve with conservative management.  -LM     Precautions/Limitations hip precautions- left;fall precautions  -LM     Prior Level of Function min assist:;all household mobility;community mobility;gait;transfer;bed mobility;ADL's  -LM     Equipment Currently Used at Home walker, rolling;shower chair;grab bar;raised toilet;cane, quad  -LM     Plans/Goals Discussed With patient and family;agreed upon  -LM     Risks Reviewed patient and family:;LOB;nausea/vomiting;dizziness;increased discomfort;change in vital signs  -LM     Benefits Reviewed patient and family:;improve function;increase independence;increase strength;increase balance;decrease pain  -LM     Barriers to Rehab none identified  -LM     Living Environment    Lives With spouse  -LM spouse  -MY    Living Arrangements house  -LM house  -MY    Home Accessibility bed and bath are not on the first floor;stairs to enter home;stairs within home   walk-in shower  -LM no concerns  -MY    Number of Stairs to Enter Home 1  -LM     Number of Stairs Within Home 14  -LM     Stair Railings at Home inside, present on right side   no handrails outside  -LM     Type of Financial/Environmental Concern  none  -MY    Transportation Available  car;family or friend will provide  -MY    Living Environment Comment Pt lives in 2-story home with bed/bath on second floor. Daughter will be staying with patient to assist as needed.  -LM     Clinical Impression    Date of Referral to PT 12/11/17  -LM     PT Diagnosis s/p elective left SALVADOR  -LM     Prognosis good  -LM     Patient/Family Goals Statement return home  -LM     Criteria for Skilled Therapeutic Interventions Met yes;treatment indicated  -LM     Rehab Potential good, to achieve stated  therapy goals  -LM     Pain Assessment    Pain Assessment 0-10  -LM     Pain Score 2  -LM     Post Pain Score 2  -LM     Pain Type Acute pain  -LM     Pain Location Hip  -LM     Pain Orientation Left  -LM     Pain Intervention(s) Repositioned;Ambulation/increased activity  -LM     Response to Interventions tolerated  -LM     Vision Assessment/Intervention    Visual Impairment WFL  -LM     Cognitive Assessment/Intervention    Current Cognitive/Communication Assessment functional  -LM     Orientation Status oriented x 4  -LM     Follows Commands/Answers Questions 100% of the time;able to follow single-step instructions;needs cueing  -LM     Personal Safety WNL/WFL  -LM     Personal Safety Interventions fall prevention program maintained;gait belt;nonskid shoes/slippers when out of bed;supervised activity  -LM     ROM (Range of Motion)    General ROM lower extremity range of motion deficits identified   defer UE to OT  -LM     General LE Assessment    ROM hip, left: LE ROM deficit  -LM     ROM Detail left hip ROM impaired 25%  -LM     MMT (Manual Muscle Testing)    General MMT Assessment lower extremity strength deficits identified   defer UE to OT  -LM     General MMT Assessment Detail left LE not formally tested; right LE functionally 4+/5  -LM     Lower Extremity    Lower Ext Manual Muscle Testing --  -LM     Mobility Assessment/Training    Extremity Weight-Bearing Status left lower extremity  -LM     Left Lower Extremity Weight-Bearing weight-bearing as tolerated  -LM     Bed Mobility, Assessment/Treatment    Bed Mobility, Assistive Device head of bed elevated  -LM     Bed Mob, Supine to Sit, [place] supervision required;verbal cues required  -LM     Bed Mob, Sit to Supine, [place] not tested   Pt left UIC  -LM     Bed Mobility, Safety Issues decreased use of legs for bridging/pushing  -LM     Bed Mobility, Impairments strength decreased;pain  -LM     Bed Mobility, Comment Verbal cues for sequencing and  to adhere to hip precautions. Pt able to independently raise trunk to sitting and then use bilateral UEs to move body towards EOB. Pt denied dizziness upon sitting EOB.   -LM     Transfer Assessment/Treatment    Transfers, Sit-Stand [place] contact guard assist;2 person assist required;verbal cues required  -LM     Transfers, Stand-Sit [place] contact guard assist;2 person assist required;verbal cues required  -LM     Transfers, Sit-Stand-Sit, Assist Device rolling walker  -LM     Transfer, Safety Issues sequencing ability decreased;step length decreased;weight-shifting ability decreased  -LM     Transfer, Impairments ROM decreased;strength decreased;pain  -LM     Transfer, Comment Verbal cues to push from bed with at least one hand when standing, reach for chair armrests when sitting, step out left LE prior to t/f. Pt denied dizziness upon standing.  -LM     Gait Assessment/Treatment    Gait, [place] Level contact guard assist;1 person + 1 person to manage equipment  -LM     Gait, Assistive Device rolling walker  -LM     Gait, Distance (Feet) 160  -LM     Gait, Gait Pattern Analysis swing-through gait  -LM     Gait, Gait Deviations left:;dominique decreased;decreased heel strike;forward flexed posture;step length decreased;weight-shifting ability decreased  -LM     Gait, Safety Issues sequencing ability decreased;step length decreased;weight-shifting ability decreased  -LM     Gait, Impairments ROM decreased;strength decreased;pain  -LM     Gait, Comment Pt ambulated with step-through pattern at slow pace. Initially, pt exhibited increased bilateral UE weight bearing to off-load left LE during turns. Verbal cues to increase left LE weight-bearing, decrease bilateral UE weight bearing, upright posture, and increase step length. Gait limited by fatigue.  -LM     Motor Skills/Interventions    Additional Documentation Balance Skills Training (Group)  -LM     Balance Skills Training    Sitting-Level of  Assistance Independent  -LM     Sitting-Balance Support Right upper extremity supported;Left upper extremity supported;Feet supported  -LM     Standing-Level of Assistance Contact guard  -LM     Static Standing Balance Support assistive device  -LM     Gait Balance-Level of Assistance Contact guard  -LM     Gait Balance Support assistive device  -LM     Therapy Exercises    Exercise Protocols total hip  -LM     Total Hip Exercises left:;10 reps;ankle pumps/circles;quad set;glut set;LAQ  -LM     Sensory Assessment/Intervention    Sensory Impairment --   Pt denies n/t in bilateral LEs; able to actively DF L ankle  -LM     Positioning and Restraints    Pre-Treatment Position in bed  -LM     Post Treatment Position chair  -LM     In Chair notified nsg;reclined;sitting;call light within reach;encouraged to call for assist;exit alarm on;with family/caregiver;legs elevated  -LM       User Key  (r) = Recorded By, (t) = Taken By, (c) = Cosigned By    Initials Name Provider Type    MY Mojgan Andino RN Registered Nurse    IVAN Cuadra PT Physical Therapist          Physical Therapy Education     Title: PT OT SLP Therapies (Active)     Topic: Physical Therapy (Active)     Point: Mobility training (Active)    Learning Progress Summary    Learner Readiness Method Response Comment Documented by Status   Patient Acceptance E,D NR Reviewed benefits of activity, hip precautions, WB status, correct gait mechanics.  12/11/17 1520 Active   Family Acceptance E,D NR Reviewed benefits of activity, hip precautions, WB status, correct gait mechanics.  12/11/17 1520 Active               Point: Body mechanics (Active)    Learning Progress Summary    Learner Readiness Method Response Comment Documented by Status   Patient Acceptance E,D NR Reviewed benefits of activity, hip precautions, WB status, correct gait mechanics.  12/11/17 1520 Active   Family Acceptance E,D NR Reviewed benefits of activity, hip precautions, WB  status, correct gait mechanics.  12/11/17 1520 Active               Point: Precautions (Active)    Learning Progress Summary    Learner Readiness Method Response Comment Documented by Status   Patient Acceptance E,D NR Reviewed benefits of activity, hip precautions, WB status, correct gait mechanics.  12/11/17 1520 Active   Family Acceptance E,D NR Reviewed benefits of activity, hip precautions, WB status, correct gait mechanics.  12/11/17 1520 Active                      User Key     Initials Effective Dates Name Provider Type Discipline     06/09/17 -  Elke Cuadra, PT Physical Therapist PT                PT Recommendation and Plan  Anticipated Equipment Needs At Discharge: other (see comments) (none)  Anticipated Discharge Disposition: home with assist, home with home health  Demonstrates Need for Referral to Another Service: home health care  Planned Therapy Interventions: balance training, bed mobility training, gait training, home exercise program, patient/family education, stair training, strengthening, transfer training  PT Frequency: 2 times/day  Plan of Care Review  Plan Of Care Reviewed With: patient, family  Progress: progress toward functional goals as expected  Outcome Summary/Follow up Plan: PT eval complete. Pt presents with decreased functional independence warranting further need for skilled PT services. Pt ambulated 160 feet with CGA and RW, limited by fatigue. PT will follow to improve strength, balance, and functional mobility. Will initiate stair training next visit for safe d/c home. Recommend d/c home with assist and HHPT.           IP PT Goals       12/11/17 1521          Bed Mobility PT LTG    Bed Mobility PT LTG, Date Established 12/11/17  -LM      Bed Mobility PT LTG, Time to Achieve 3 days  -LM      Bed Mobility PT LTG, Activity Type supine to sit/sit to supine  -LM      Bed Mobility PT LTG, [place] Level independent  -LM      Transfer Training PT LTG    Transfer Training  PT LTG, Date Established 12/11/17  -LM      Transfer Training PT LTG, Time to Achieve 3 days  -LM      Transfer Training PT LTG, Activity Type sit to stand/stand to sit  -LM      Transfer Training PT LTG, [place] Level conditional independence  -LM      Transfer Training PT LTG, Assist Device walker, rolling  -LM      Gait Training PT LTG    Gait Training Goal PT LTG, Date Established 12/11/17  -LM      Gait Training Goal PT LTG, Time to Achieve 3 days  -LM      Gait Training Goal PT LTG, [place] Level conditional independence  -LM      Gait Training Goal PT LTG, Assist Device walker, rolling  -LM      Gait Training Goal PT LTG, Distance to Achieve 500 feet  -LM      Stair Training PT STG    Stair Training Goal PT STG, Date Established 12/11/17  -LM      Stair Training Goal PT STG, Time to Achieve 3 days  -LM      Stair Training Goal PT STG, Number of Steps 1  -LM      Stair Training Goal PT STG, [place] Level conditional independence  -LM      Stair Training Goal PT STG, Assist Device walker, rolling  -LM      Stair Training PT LTG    Stair Training Goal PT LTG, Date Established 12/11/17  -LM      Stair Training Goal PT LTG, Time to Achieve 3 days  -LM      Stair Training Goal PT LTG, Number of Steps 14  -LM      Stair Training Goal PT LTG, [place] Level supervision required  -LM      Stair Training Goal PT LTG, Assist Device 1 handrail  -LM        User Key  (r) = Recorded By, (t) = Taken By, (c) = Cosigned By    Initials Name Provider Type    IVAN Cuadra, PT Physical Therapist                Outcome Measures       12/11/17 8149          How much help from another person do you currently need...    Turning from your back to your side while in flat bed without using bedrails? 3  -LM      Moving from lying on back to sitting on the side of a flat bed without bedrails? 3  -LM      Moving to and from a bed to a chair (including a wheelchair)? 3  -LM      Standing up from a chair using your  arms (e.g., wheelchair, bedside chair)? 3  -LM      Climbing 3-5 steps with a railing? 3  -LM      To walk in hospital room? 3  -LM      AM-PAC 6 Clicks Score 18  -LM      Functional Assessment    Outcome Measure Options AM-PAC 6 Clicks Basic Mobility (PT)  -LM        User Key  (r) = Recorded By, (t) = Taken By, (c) = Cosigned By    Initials Name Provider Type    LM Elke Cuadra PT Physical Therapist           Time Calculation:         PT Charges       12/11/17 1523          Time Calculation    Start Time 1308  -LM      PT Received On 12/11/17  -LM      PT Goal Re-Cert Due Date 12/21/17  -LM      Time Calculation- PT    Total Timed Code Minutes- PT 0 minute(s)  -LM        User Key  (r) = Recorded By, (t) = Taken By, (c) = Cosigned By    Initials Name Provider Type    IVAN Cuadra PT Physical Therapist          Therapy Charges for Today     Code Description Service Date Service Provider Modifiers Qty    32816981352 HC PT EVAL MOD COMPLEXITY 4 12/11/2017 Elke Cuadra, PT GP 1    05604136252 HC PT THER SUPP EA 15 MIN 12/11/2017 Elke Cuadra, PT GP 2          PT G-Codes  Outcome Measure Options: AM-PAC 6 Clicks Basic Mobility (PT)      Elke Cuadra PT  12/11/2017            Electronically signed by Elke Cuadra PT at 12/11/2017  3:24 PM      Nahomy Montgomery, PT at 12/12/2017  9:35 AM  Version 1 of 1         Problem: Patient Care Overview (Adult)  Goal: Plan of Care Review  Outcome: Ongoing (interventions implemented as appropriate)    12/12/17 0845   Coping/Psychosocial Response Interventions   Plan Of Care Reviewed With patient   Patient Care Overview   Progress progress toward functional goals as expected   Outcome Evaluation   Outcome Summary/Follow up Plan Patient ambulated 600 feet with RW and good step through gait pattern. Completed stair training with no difficulty. Nearly independent with all mobility. Patient has been d/c home with family and HHPT.          Problem: Inpatient  Physical Therapy  Goal: Bed Mobility Goal LTG- PT  Outcome: Unable to achieve outcome(s) by discharge Date Met:  12/12/17 12/12/17 0845   Bed Mobility PT LTG   Bed Mobility PT LTG, Date Established 12/11/17   Bed Mobility PT LTG, Time to Achieve 3 days   Bed Mobility PT LTG, Activity Type supine to sit/sit to supine   Bed Mobility PT LTG, [place] Level independent   Bed Mobility PT LTG, Date Goal Reviewed 12/12/17   Bed Mobility PT LTG, Outcome goal not met   Bed Mobility PT LTG, Reason Goal Not Met discharged from facility       Goal: Transfer Training Goal 1 LTG- PT  Outcome: Unable to achieve outcome(s) by discharge Date Met:  12/12/17 12/12/17 0845   Transfer Training PT LTG   Transfer Training PT LTG, Date Established 12/11/17   Transfer Training PT LTG, Time to Achieve 3 days   Transfer Training PT LTG, Activity Type sit to stand/stand to sit   Transfer Training PT LTG, [place] Level conditional independence   Transfer Training PT LTG, Assist Device walker, rolling   Transfer Training PT LTG, Date Goal Reviewed 12/12/17   Transfer Training PT LTG, Outcome goal not met   Transfer Training PT LTG, Reason Goal Not Met discharged from facility       Goal: Gait Training Goal LTG- PT  Outcome: Unable to achieve outcome(s) by discharge Date Met:  12/12/17 12/12/17 0845   Gait Training PT LTG   Gait Training Goal PT LTG, Date Established 12/11/17   Gait Training Goal PT LTG, Time to Achieve 3 days   Gait Training Goal PT LTG, [place] Level conditional independence   Gait Training Goal PT LTG, Assist Device walker, rolling   Gait Training Goal PT LTG, Distance to Achieve 500 feet   Gait Training Goal PT LTG, Date Goal Reviewed 12/12/17   Gait Training Goal PT LTG, Outcome goal partially met  (distance achieved but not at level of assist)   Gait Training Goal PT LTG, Reason Goal Not Met discharged from facility       Goal: Stair Training Goal STG- PT  Outcome: Unable to achieve outcome(s) by  discharge Date Met:  17 0845   Stair Training PT STG   Stair Training Goal PT STG, Date Established 17   Stair Training Goal PT STG, Time to Achieve 3 days   Stair Training Goal PT STG, Number of Steps 1   Stair Training Goal PT STG, [place] Level conditional independence   Stair Training Goal PT STG, Assist Device walker, rolling   Stair Training Goal PT STG, Date Goal Reviewed 17   Stair Training Goal PT STG, Outcome goal not met   Stair Training Goal PT STG, Reason Goal Not Met discharged from facility       Goal: Stair Training Goal LTG- PT  Outcome: Unable to achieve outcome(s) by discharge Date Met:  17 0845   Stair Training PT LTG   Stair Training Goal PT LTG, Date Established 17   Stair Training Goal PT LTG, Time to Achieve 3 days   Stair Training Goal PT LTG, Number of Steps 14   Stair Training Goal PT LTG, [place] Level supervision required   Stair Training Goal PT LTG, Assist Device 1 handrail   Stair Training Goal PT LTG, Date Goal Reviewed 17   Stair Training Goal PT LTG, Outcome goal not met   Stair Training Goal PT LTG, Reason Goal Not Met discharged from facility              Electronically signed by Nahomy Montgomery, PT at 2017  9:35 AM      Nahomy Montgomery, PT at 2017  9:35 AM  Version 1 of 1         Acute Care - Physical Therapy Treatment Note/Discharge  [facility]     Patient Name: Prashanth Paz IV  : 1950  MRN: [id number]  Today's Date: 2017  Onset of Illness/Injury or Date of Surgery Date: 17  Date of Referral to PT: 17  Referring Physician: MD Darya    Admit Date: 2017    Visit Dx:    ICD-10-CM ICD-9-CM   1. Impaired functional mobility, balance, gait, and endurance Z74.09 V49.89   2. Primary osteoarthritis of left hip M16.12 715.15     Patient Active Problem List   Diagnosis   • Primary osteoarthritis of left hip   • Status post total replacement of left hip   •  Prediabetes       Physical Therapy Education     Title: PT OT SLP Therapies (Active)     Topic: Physical Therapy (Active)     Point: Mobility training (Active)    Learning Progress Summary    Learner Readiness Method Response Comment Documented by Status   Patient Acceptance E,D,H NR,VU Issued and reviewed written/illustrated HEP. Educated on correct stair training technique, correct car t/f technique, and hip precautions. LR 12/12/17 0930 Done    Acceptance E,D NR Reviewed benefits of activity, hip precautions, WB status, correct gait mechanics. LM 12/11/17 1520 Active   Family Acceptance E,D NR Reviewed benefits of activity, hip precautions, WB status, correct gait mechanics.  12/11/17 1520 Active               Point: Home exercise program (Done)    Learning Progress Summary    Learner Readiness Method Response Comment Documented by Status   Patient Acceptance E,D,H NR,VU Issued and reviewed written/illustrated HEP. Educated on correct stair training technique, correct car t/f technique, and hip precautions. LR 12/12/17 0930 Done               Point: Body mechanics (Active)    Learning Progress Summary    Learner Readiness Method Response Comment Documented by Status   Patient Acceptance E,D,H NR,VU Issued and reviewed written/illustrated HEP. Educated on correct stair training technique, correct car t/f technique, and hip precautions. LR 12/12/17 0930 Done    Acceptance E,D NR Reviewed benefits of activity, hip precautions, WB status, correct gait mechanics.  12/11/17 1520 Active   Family Acceptance E,D NR Reviewed benefits of activity, hip precautions, WB status, correct gait mechanics.  12/11/17 1520 Active               Point: Precautions (Active)    Learning Progress Summary    Learner Readiness Method Response Comment Documented by Status   Patient Acceptance E,D,H NR,VU Issued and reviewed written/illustrated HEP. Educated on correct stair training technique, correct car t/f technique, and hip  precautions. LR 12/12/17 0930 Done    Acceptance E,D NR Reviewed benefits of activity, hip precautions, WB status, correct gait mechanics. LM 12/11/17 1520 Active   Family Acceptance E,D NR Reviewed benefits of activity, hip precautions, WB status, correct gait mechanics. LM 12/11/17 1520 Active                      User Key     Initials Effective Dates Name Provider Type Discipline    LR 06/19/15 -  Nahomy Montgomery, PT Physical Therapist PT    LM 06/09/17 -  Elke Cuadra, PT Physical Therapist PT                    IP PT Goals       12/12/17 0845 12/11/17 1521       Bed Mobility PT LTG    Bed Mobility PT LTG, Date Established 12/11/17  -LR 12/11/17  -LM     Bed Mobility PT LTG, Time to Achieve 3 days  -LR 3 days  -LM     Bed Mobility PT LTG, Activity Type supine to sit/sit to supine  -LR supine to sit/sit to supine  -LM     Bed Mobility PT LTG, [place] Level independent  -LR independent  -LM     Bed Mobility PT LTG, Date Goal Reviewed 12/12/17  -LR      Bed Mobility PT LTG, Outcome goal not met  -LR      Bed Mobility PT LTG, Reason Goal Not Met discharged from facility  -LR      Transfer Training PT LTG    Transfer Training PT LTG, Date Established 12/11/17  -LR 12/11/17  -LM     Transfer Training PT LTG, Time to Achieve 3 days  -LR 3 days  -LM     Transfer Training PT LTG, Activity Type sit to stand/stand to sit  -LR sit to stand/stand to sit  -LM     Transfer Training PT LTG, [place] Level conditional independence  -LR conditional independence  -LM     Transfer Training PT LTG, Assist Device walker, rolling  -LR walker, rolling  -LM     Transfer Training PT  LTG, Date Goal Reviewed 12/12/17  -LR      Transfer Training PT LTG, Outcome goal not met  -LR      Transfer Training PT LTG, Reason Goal Not Met discharged from facility  -LR      Gait Training PT LTG    Gait Training Goal PT LTG, Date Established 12/11/17  -LR 12/11/17  -LM     Gait Training Goal PT LTG, Time to Achieve 3 days  -LR 3 days   -LM     Gait Training Goal PT LTG, [place] Level conditional independence  -LR conditional independence  -LM     Gait Training Goal PT LTG, Assist Device walker, rolling  -LR walker, rolling  -LM     Gait Training Goal PT LTG, Distance to Achieve 500 feet  - feet  -LM     Gait Training Goal PT LTG, Date Goal Reviewed 12/12/17  -LR      Gait Training Goal PT LTG, Outcome goal partially met   distance achieved but not at level of assist  -LR      Gait Training Goal PT LTG, Reason Goal Not Met discharged from facility  -LR      Stair Training PT STG    Stair Training Goal PT STG, Date Established 12/11/17  -LR 12/11/17  -LM     Stair Training Goal PT STG, Time to Achieve 3 days  -LR 3 days  -LM     Stair Training Goal PT STG, Number of Steps 1  -LR 1  -LM     Stair Training Goal PT STG, [place] Level conditional independence  -LR conditional independence  -LM     Stair Training Goal PT STG, Assist Device walker, rolling  -LR walker, rolling  -LM     Stair Training Goal PT STG, Date Goal Reviewed 12/12/17  -LR      Stair Training Goal PT STG, Outcome goal not met  -LR      Stair Training Goal PT STG, Reason Goal Not Met discharged from facility  -LR      Stair Training PT LTG    Stair Training Goal PT LTG, Date Established 12/11/17  -LR 12/11/17  -LM     Stair Training Goal PT LTG, Time to Achieve 3 days  -LR 3 days  -LM     Stair Training Goal PT LTG, Number of Steps 14  -LR 14  -LM     Stair Training Goal PT LTG, [place] Level supervision required  -LR supervision required  -LM     Stair Training Goal PT LTG, Assist Device 1 handrail  -LR 1 handrail  -LM     Stair Training Goal PT LTG, Date Goal Reviewed 12/12/17  -LR      Stair Training Goal PT LTG, Outcome goal not met  -LR      Stair Training Goal PT LTG, Reason Goal Not Met discharged from facility  -LR        User Key  (r) = Recorded By, (t) = Taken By, (c) = Cosigned By    Initials Name Provider Type    LR Nahomy Montgomery, PT Physical  Therapist    IVAN Cuadra, PT Physical Therapist              Adult Rehabilitation Note       12/12/17 0845          Rehab Assessment/Intervention    Discipline physical therapist  -LR      Document Type therapy note (daily note);discharge summary  -LR      Subjective Information agree to therapy;complains of;pain  -LR      Patient Effort, Rehab Treatment excellent  -LR      Symptoms Noted During/After Treatment none  -LR      Precautions/Limitations fall precautions;hip precautions- left  -LR      Recorded by [LR] Nahomy Montgomery, PT      Pain Assessment    Pain Assessment 0-10  -LR      Pain Score 3  -LR      Post Pain Score 3  -LR      Pain Type Acute pain  -LR      Pain Location Hip  -LR      Pain Orientation Left  -LR      Pain Intervention(s) Repositioned;Ambulation/increased activity  -LR      Recorded by [LR] Nahomy Montgomery, PT      Cognitive Assessment/Intervention    Current Cognitive/Communication Assessment functional  -LR      Orientation Status oriented x 4;required verbal cueing (specifiy in comments)  -LR      Follows Commands/Answers Questions 100% of the time;able to follow single-step instructions;needs cueing;needs repetition  -LR      Personal Safety WNL/WFL  -LR      Recorded by [LR] Nahomy Montgomery, PT      Mobility Assessment/Training    Extremity Weight-Bearing Status left lower extremity  -LR      Left Lower Extremity Weight-Bearing weight-bearing as tolerated  -LR      Recorded by [LR] Nahomy Montgomery, PT      Bed Mobility, Assessment/Treatment    Bed Mobility, Assistive Device head of bed elevated;bed rails  -LR      Bed Mob, Supine to Sit, [place] verbal cues required;conditional independence  -LR      Bed Mob, Sit to Supine, [place] not tested   UIC at end of treatment.   -LR      Bed Mobility, Safety Issues decreased use of legs for bridging/pushing  -LR      Bed Mobility, Impairments ROM decreased;strength decreased;pain  -LR      Bed Mobility,  Comment Verbal cues to move LEs towards EOB and to push up from bed to raise trunk into sitting and to scoot hips out to get feet on floor. Cues to not flex at hips past 90 degrees. Denies dizziness upon sitting.   -LR      Recorded by [LR] Nahomy Montgomery, PT      Transfer Assessment/Treatment    Transfers, Sit-Stand [place] verbal cues required;supervision required  -LR      Transfers, Stand-Sit [place] verbal cues required;supervision required  -LR      Transfers, Sit-Stand-Sit, Assist Device rolling walker  -LR      Transfer, Safety Issues sequencing ability decreased;step length decreased;weight-shifting ability decreased  -LR      Transfer, Impairments ROM decreased;strength decreased;pain  -LR      Transfer, Comment Verbal cues for correct hand placement with t/f, to step L LE out before t/f to avoid flexing at hips past 90 degrees.   -LR      Recorded by [LR] Nahomy Montgomery, PT      Gait Assessment/Treatment    Gait, [place] Level verbal cues required;stand by assist  -LR      Gait, Assistive Device rolling walker  -LR      Gait, Distance (Feet) 600  -LR      Gait, Gait Pattern Analysis swing-through gait  -LR      Gait, Gait Deviations left:;antalgic;forward flexed posture;step length decreased;toe-to-floor clearance decreased;weight-shifting ability decreased  -LR      Gait, Safety Issues sequencing ability decreased;step length decreased;weight-shifting ability decreased  -LR      Gait, Impairments ROM decreased;strength decreased;pain  -LR      Gait, Comment Patient ambulated with step through gait pattern at slow pace. Verbal cues for increased L LE weight bearing/stance phase, decreased UE weight bearing, and upright posture. Improved with cues for correction. Gait limited by fatigue.   -LR      Recorded by [LR] Nahomy Montgomery, PT      Stairs Assessment/Treatment    Number of Stairs 10  -LR      Stairs, Handrail Location left side (ascending)  -LR      Stairs,  [place] Level verbal cues required;contact guard assist  -LR      Stairs, Assistive Device axillary crutches  -LR      Stairs, Technique Used step to step (ascending);step to step (descending)  -LR      Stairs, Safety Issues sequencing ability decreased;weight-shifting ability decreased  -LR      Stairs, Impairments ROM decreased;strength decreased;pain  -LR      Stairs, Comment Patient climbed 5 steps with 2 handrails and 5 steps with one handrail and crutch on opposite side. Verbal cues to take one step at a time and to step up with strong LE first and down with weak LE first. Verbal cues for correct placement and sequencing of crutch.   -LR      Recorded by [LR] Nahomy Montgomery, PT      Therapy Exercises    Exercise Protocols total hip  -LR      Total Hip Exercises left:;15 reps;completed protocol;ankle pumps/circles;quad set;glut set;heel slides;hip abduction;SAQ;LAQ;SLR   cues for technique; no assist required.   -LR      Recorded by [LR] Nahomy Montgomery, PT      Positioning and Restraints    Pre-Treatment Position in bed  -LR      Post Treatment Position chair  -LR      In Chair notified nsg;sitting;call light within reach;encouraged to call for assist;with nsg  -LR      Recorded by [LR] Nahomy Montgomery, PT        User Key  (r) = Recorded By, (t) = Taken By, (c) = Cosigned By    Initials Name Effective Dates    LR Nahomy Montgomery, PT 06/19/15 -           PT Recommendation and Plan  Anticipated Equipment Needs At Discharge: other (see comments) (none)  Anticipated Discharge Disposition: home with assist, home with home health  Demonstrates Need for Referral to Another Service: home health care  Planned Therapy Interventions: balance training, bed mobility training, gait training, home exercise program, patient/family education, stair training, strengthening, transfer training  PT Frequency: 2 times/day  Plan of Care Review  Plan Of Care Reviewed With: patient  Progress: progress  toward functional goals as expected  Outcome Summary/Follow up Plan: Patient ambulated 600 feet with RW and good step through gait pattern. Completed stair training with no difficulty. Nearly independent with all mobility. Patient has been d/c home with family and HHPT.           Outcome Measures       12/12/17 0845 12/11/17 1308       How much help from another person do you currently need...    Turning from your back to your side while in flat bed without using bedrails? 4  -LR 3  -LM     Moving from lying on back to sitting on the side of a flat bed without bedrails? 4  -LR 3  -LM     Moving to and from a bed to a chair (including a wheelchair)? 3  -LR 3  -LM     Standing up from a chair using your arms (e.g., wheelchair, bedside chair)? 3  -LR 3  -LM     Climbing 3-5 steps with a railing? 3  -LR 3  -LM     To walk in hospital room? 3  -LR 3  -LM     AM-PAC 6 Clicks Score 20  -LR 18  -LM     Functional Assessment    Outcome Measure Options AM-PAC 6 Clicks Basic Mobility (PT)  -LR AM-PAC 6 Clicks Basic Mobility (PT)  -LM       User Key  (r) = Recorded By, (t) = Taken By, (c) = Cosigned By    Initials Name Provider Type    LR Nahomy Montgomery, PT Physical Therapist    LM Elke Cuadra PT Physical Therapist           Time Calculation:         PT Charges       12/12/17 0931          Time Calculation    Start Time 0845  -LR      PT Received On 12/12/17  -LR      PT Goal Re-Cert Due Date 12/21/17  -LR      Time Calculation- PT    Total Timed Code Minutes- PT 38 minute(s)  -LR        User Key  (r) = Recorded By, (t) = Taken By, (c) = Cosigned By    Initials Name Provider Type    LR Nahomy Montgomery, PT Physical Therapist          Therapy Charges for Today     Code Description Service Date Service Provider Modifiers Qty    28024433005 HC GAIT TRAINING EA 15 MIN 12/12/2017 Nahomy Montgomery, PT GP 2    25172456513 HC PT THER PROC EA 15 MIN 12/12/2017 Nahomy Montgomery, PT GP 1          PT  G-Codes  Outcome Measure Options: AM-PAC 6 Clicks Basic Mobility (PT)    PT Discharge Summary  Anticipated Discharge Disposition: home with assist, home with home health  Reason for Discharge: Discharge from facility  Outcomes Achieved: Patient able to partially acheive established goals  Discharge Destination: Home with assist, Home with home health    Nahomy Montgomery, PT  12/12/2017            Electronically signed by Nahomy Montgomery, PT at 12/12/2017  9:35 AM

## 2017-12-12 NOTE — DISCHARGE SUMMARY
Patient Name: Prashanth Paz IV  MRN: [id number]  : 1950  DOS: 2017    Attending: No att. providers found    Primary Care Provider: Stephen Lou MD    Date of Admission:.2017  5:57 AM    Date of Discharge:  2017    Discharge Diagnosis: Principal Problem:    Status post total replacement of left hip  Active Problems:    Primary osteoarthritis of left hip    Prediabetes      Hospital Course  Patient is a 67 y.o. male presented for left total hip arthroplasty by Dr. Lackey under spinal anesthesia. He tolerated surgery well and was admitted for further medical management. His hip has been severely painful for the last 3 months; he was having difficulty getting out of the car. He denies use of assistive device for ambulation.      He had previous right hip replacement about 3 years ago. Following that surgery he became septic and required a lengthy hospitalization, including prolonged IV abx after a spacer was placed with explant of hip implant, after that he had a stay at [facility] with a revision . He is on lifetime suppression therapy with Keflex.      The patient has done well postop. The patient has been able to ambulate 600 feet with PT.  The patient has had good pain control with PO pain medications.     The patient was placed on DVT prophylaxis including aspirin. The patient was encouraged to use IS for atelectasis prophylaxis.     The patient was placed on a bowel regimen to prevent constipation while on pain medication.   The patient's H/H was monitored with a slight decrease that remained asymptomatic.    It is felt by all involved that the patient can discharge home at this time, and the patient has no further questions      Procedures Performed  DATE OF PROCEDURE: 17      SURGEON: Reed Lackey M.D.      PREOPERATIVE DIAGNOSIS: Advanced degenerative joint disease of the left hip secondary to osteoarthritis      POSTOPERATIVE DIAGNOSIS: same      PROCEDURE:  "Left Total Hip Arthroplasty        Pertinent Test Results:    I reviewed the patient's new clinical results.     Results from last 7 days  Lab Units 17  0539   WBC 10*3/mm3 7.14   HEMOGLOBIN g/dL 10.6*   HEMATOCRIT % 31.6*   PLATELETS 10*3/mm3 191       Results from last 7 days  Lab Units 17  0539   SODIUM mmol/L 142   POTASSIUM mmol/L 4.1   CHLORIDE mmol/L 109   CO2 mmol/L 27.0   BUN mg/dL 22   CREATININE mg/dL 0.80   CALCIUM mg/dL 8.1*   GLUCOSE mg/dL 111*     I reviewed the patient's new imaging including images and reports.      Physical therapy: Patient ambulated 600 feet with RW and good step through gait pattern. Completed stair training with no difficulty. Nearly independent with all mobility. Patient has been d/c home with family and HHPT.     Discharge Assessment:    Vital Signs  /59  Pulse 70  Temp 97.6 °F (36.4 °C)  Resp 16  Ht 177.8 cm (70\")  Wt 78.5 kg (173 lb 1 oz)  SpO2 93%  BMI 24.83 kg/m2  Temp (24hrs), Av.5 °F (36.9 °C), Min:97.6 °F (36.4 °C), Max:99 °F (37.2 °C)      General Appearance:    Alert, cooperative, in no acute distress   Lungs:     Clear to auscultation,respirations regular, even and                   unlabored    Heart:    Regular rhythm and normal rate, normal S1 and S2   Abdomen:     Normal bowel sounds, no masses, no organomegaly, soft        non-tender, non-distended, no guarding, no rebound                 tenderness   Extremities:   Moves all extremities well, no edema, no cyanosis, no              Redness. Left hip Aquacel CDI.   Pulses:   Pulses palpable and equal bilaterally   Skin:   No bleeding, bruising or rash   Neurologic:   Cranial nerves 2 - 12 grossly intact, sensation intact. Flexion and dorsiflexion intact bilateral feet.       Discharge Disposition: Home    Discharge Medications   Prashanth Paz IV   Home Medication Instructions LONI:[id number]    Printed on:17 1659   Medication Information                      aspirin  MG " "EC tablet  Take 1 tablet by mouth Every 12 (Twelve) Hours. For 1 month             cephalexin (KEFLEX) 500 MG capsule  Take 500 mg by mouth 3 (Three) Times a Day.             docusate sodium 100 MG capsule  Take 100 mg by mouth 2 (Two) Times a Day.             oxyCODONE-acetaminophen (PERCOCET) 5-325 MG per tablet  Take 1 tablet by mouth Every 4 (Four) Hours As Needed for Moderate Pain  for up to 9 days.                 Discharge Diet: Consistent carb diet    Activity at Discharge: WBAT LLE    Total Hip Replacement/Hip Hemiarthroplasty     Wound Care   1) Keep wound / incision area clean and dry.   2) Dressing to remain in place until post-operative day 7. Upon dressing removal, assess for wound drainage. If no drainage is present, keep wound / incision area open to air as much as possible. If drainage is present, place sterile dressing to cover wound and assess daily. If drainage continues to occur after post-operative day 14, call the office for an urgent appointment. (You should be seen in the clinic within 1-2 days of calling).   3) No baths or swimming until otherwise instructed. The wound must remain dry for 10 days after surgery. After 10 days, you may begin to shower only if no drainage is present. No submerging the wound under standing water until cleared by your physician (no baths, hot tubs, swimming pools, etc). Sponge baths are the best way to perform personal hygiene while at the same time protecting the wound from moisture.   4) Prior to showering, the wound must remain dry for 72 consecutive hours (no drainage whatsoever) prior to showering. If the wound drains or spots, the clock \"resets\" - make sure the wound has been drainage-free for 72 consecutive hours.   5) Once you are allowed to get the wound wet, please use gentle soap to wash the wound area. DO NOT aggressively scrub the wound with a washcloth or bath sponge. Please visually inspect your wound(s) at least once daily. If the wound(s) are in " "a difficult to see location, please use a mirror or have someone else assist with visual inspection.   6) No scrubbing the wound. You may \"pad dry\" the wound, but do not rub, as this may open up the wound and pre-dispose to wound infection.   7) Do not apply lotions or creams to incision site, unless instructed otherwise.   8) Observe for redness, swelling, or drainage. Please call the clinic immediately if you have fevers, chills with warmth/redness surrounding wound site or if you notice pus drainage from the wound site     Activity   No heavy lifting objects greater than 10 pounds.   No driving while on narcotic pain medication.   No submerging wound under standing water (pool, bath tub, etc.) until otherwise instructed.   You may be protected weightbearing as tolerated on your operative (left lower) extremity   Use a walker for ambulation for at least 2 weeks after surgery.  May wean from walker after 2 weeks if approved by your therapist.   Posterior hip precautions for 6 weeks: No bending the hip past 90 degrees. Do not allow the leg to cross the midline of your body (adduction). No twisting motions. Ask your physical therapist to review these precautions with you.     Follow-up Appointments  Dr. Lackey per his orders    Discharge took over 30 min.  Seen and examined by me. Agree with above. Discussed with patient and brother. patricia Kinney MD  12/12/17  4:51 PM  "

## 2017-12-12 NOTE — THERAPY DISCHARGE NOTE
Acute Care - Physical Therapy Treatment Note/Discharge  [facility]     Patient Name: Prashanth Paz IV  : 1950  MRN: [id number]  Today's Date: 2017  Onset of Illness/Injury or Date of Surgery Date: 17  Date of Referral to PT: 17  Referring Physician: MD Darya    Admit Date: 2017    Visit Dx:    ICD-10-CM ICD-9-CM   1. Impaired functional mobility, balance, gait, and endurance Z74.09 V49.89   2. Primary osteoarthritis of left hip M16.12 715.15     Patient Active Problem List   Diagnosis   • Primary osteoarthritis of left hip   • Status post total replacement of left hip   • Prediabetes       Physical Therapy Education     Title: PT OT SLP Therapies (Active)     Topic: Physical Therapy (Active)     Point: Mobility training (Active)    Learning Progress Summary    Learner Readiness Method Response Comment Documented by Status   Patient Acceptance E,D,H NR,VU Issued and reviewed written/illustrated HEP. Educated on correct stair training technique, correct car t/f technique, and hip precautions.  17 0930 Done    Acceptance E,D NR Reviewed benefits of activity, hip precautions, WB status, correct gait mechanics.  17 1520 Active   Family Acceptance E,D NR Reviewed benefits of activity, hip precautions, WB status, correct gait mechanics.  17 1520 Active               Point: Home exercise program (Done)    Learning Progress Summary    Learner Readiness Method Response Comment Documented by Status   Patient Acceptance E,D,H NR,VU Issued and reviewed written/illustrated HEP. Educated on correct stair training technique, correct car t/f technique, and hip precautions. LR 17 0930 Done               Point: Body mechanics (Active)    Learning Progress Summary    Learner Readiness Method Response Comment Documented by Status   Patient Acceptance E,D,H NR,VU Issued and reviewed written/illustrated HEP. Educated on correct stair training technique, correct car t/f  technique, and hip precautions. LR 12/12/17 0930 Done    Acceptance E,D NR Reviewed benefits of activity, hip precautions, WB status, correct gait mechanics. LM 12/11/17 1520 Active   Family Acceptance E,D NR Reviewed benefits of activity, hip precautions, WB status, correct gait mechanics.  12/11/17 1520 Active               Point: Precautions (Active)    Learning Progress Summary    Learner Readiness Method Response Comment Documented by Status   Patient Acceptance E,D,H NR,VU Issued and reviewed written/illustrated HEP. Educated on correct stair training technique, correct car t/f technique, and hip precautions. LR 12/12/17 0930 Done    Acceptance E,D NR Reviewed benefits of activity, hip precautions, WB status, correct gait mechanics.  12/11/17 1520 Active   Family Acceptance E,D NR Reviewed benefits of activity, hip precautions, WB status, correct gait mechanics.  12/11/17 1520 Active                      User Key     Initials Effective Dates Name Provider Type Discipline    LR 06/19/15 -  Nahomy Montgomery, PT Physical Therapist PT     06/09/17 -  Elke Cuadra, PT Physical Therapist PT                    IP PT Goals       12/12/17 0845 12/11/17 1521       Bed Mobility PT LTG    Bed Mobility PT LTG, Date Established 12/11/17  -LR 12/11/17  -LM     Bed Mobility PT LTG, Time to Achieve 3 days  -LR 3 days  -LM     Bed Mobility PT LTG, Activity Type supine to sit/sit to supine  -LR supine to sit/sit to supine  -LM     Bed Mobility PT LTG, [place] Level independent  -LR independent  -LM     Bed Mobility PT LTG, Date Goal Reviewed 12/12/17  -LR      Bed Mobility PT LTG, Outcome goal not met  -LR      Bed Mobility PT LTG, Reason Goal Not Met discharged from facility  -LR      Transfer Training PT LTG    Transfer Training PT LTG, Date Established 12/11/17  -LR 12/11/17  -LM     Transfer Training PT LTG, Time to Achieve 3 days  -LR 3 days  -LM     Transfer Training PT LTG, Activity Type sit to  stand/stand to sit  -LR sit to stand/stand to sit  -LM     Transfer Training PT LTG, [place] Level conditional independence  -LR conditional independence  -LM     Transfer Training PT LTG, Assist Device walker, rolling  -LR walker, rolling  -LM     Transfer Training PT  LTG, Date Goal Reviewed 12/12/17  -LR      Transfer Training PT LTG, Outcome goal not met  -LR      Transfer Training PT LTG, Reason Goal Not Met discharged from facility  -LR      Gait Training PT LTG    Gait Training Goal PT LTG, Date Established 12/11/17  -LR 12/11/17  -LM     Gait Training Goal PT LTG, Time to Achieve 3 days  -LR 3 days  -LM     Gait Training Goal PT LTG, [place] Level conditional independence  -LR conditional independence  -LM     Gait Training Goal PT LTG, Assist Device walker, rolling  -LR walker, rolling  -LM     Gait Training Goal PT LTG, Distance to Achieve 500 feet  - feet  -LM     Gait Training Goal PT LTG, Date Goal Reviewed 12/12/17  -LR      Gait Training Goal PT LTG, Outcome goal partially met   distance achieved but not at level of assist  -LR      Gait Training Goal PT LTG, Reason Goal Not Met discharged from facility  -LR      Stair Training PT STG    Stair Training Goal PT STG, Date Established 12/11/17  -LR 12/11/17  -LM     Stair Training Goal PT STG, Time to Achieve 3 days  -LR 3 days  -LM     Stair Training Goal PT STG, Number of Steps 1  -LR 1  -LM     Stair Training Goal PT STG, [place] Level conditional independence  -LR conditional independence  -LM     Stair Training Goal PT STG, Assist Device walker, rolling  -LR walker, rolling  -LM     Stair Training Goal PT STG, Date Goal Reviewed 12/12/17  -LR      Stair Training Goal PT STG, Outcome goal not met  -LR      Stair Training Goal PT STG, Reason Goal Not Met discharged from facility  -LR      Stair Training PT LTG    Stair Training Goal PT LTG, Date Established 12/11/17  -LR 12/11/17  -LM     Stair Training Goal PT LTG, Time to  Achieve 3 days  -LR 3 days  -LM     Stair Training Goal PT LTG, Number of Steps 14  -LR 14  -LM     Stair Training Goal PT LTG, [place] Level supervision required  -LR supervision required  -LM     Stair Training Goal PT LTG, Assist Device 1 handrail  -LR 1 handrail  -LM     Stair Training Goal PT LTG, Date Goal Reviewed 12/12/17  -LR      Stair Training Goal PT LTG, Outcome goal not met  -LR      Stair Training Goal PT LTG, Reason Goal Not Met discharged from facility  -LR        User Key  (r) = Recorded By, (t) = Taken By, (c) = Cosigned By    Initials Name Provider Type    LR Nahomy Montgomery, PT Physical Therapist    LM Elke Cuadra, PT Physical Therapist              Adult Rehabilitation Note       12/12/17 0845          Rehab Assessment/Intervention    Discipline physical therapist  -LR      Document Type therapy note (daily note);discharge summary  -LR      Subjective Information agree to therapy;complains of;pain  -LR      Patient Effort, Rehab Treatment excellent  -LR      Symptoms Noted During/After Treatment none  -LR      Precautions/Limitations fall precautions;hip precautions- left  -LR      Recorded by [LR] Nahomy Montgomery, PT      Pain Assessment    Pain Assessment 0-10  -LR      Pain Score 3  -LR      Post Pain Score 3  -LR      Pain Type Acute pain  -LR      Pain Location Hip  -LR      Pain Orientation Left  -LR      Pain Intervention(s) Repositioned;Ambulation/increased activity  -LR      Recorded by [LR] Nahomy Montgomery, PT      Cognitive Assessment/Intervention    Current Cognitive/Communication Assessment functional  -LR      Orientation Status oriented x 4;required verbal cueing (specifiy in comments)  -LR      Follows Commands/Answers Questions 100% of the time;able to follow single-step instructions;needs cueing;needs repetition  -LR      Personal Safety WNL/WFL  -LR      Recorded by [LR] Nahomy Montgomery, PT      Mobility Assessment/Training    Extremity  Weight-Bearing Status left lower extremity  -LR      Left Lower Extremity Weight-Bearing weight-bearing as tolerated  -LR      Recorded by [LR] Nahomy Montgomery, PT      Bed Mobility, Assessment/Treatment    Bed Mobility, Assistive Device head of bed elevated;bed rails  -LR      Bed Mob, Supine to Sit, [place] verbal cues required;conditional independence  -LR      Bed Mob, Sit to Supine, [place] not tested   [facility] at end of treatment.   -LR      Bed Mobility, Safety Issues decreased use of legs for bridging/pushing  -LR      Bed Mobility, Impairments ROM decreased;strength decreased;pain  -LR      Bed Mobility, Comment Verbal cues to move LEs towards EOB and to push up from bed to raise trunk into sitting and to scoot hips out to get feet on floor. Cues to not flex at hips past 90 degrees. Denies dizziness upon sitting.   -LR      Recorded by [LR] Nahomy Montgomery, PT      Transfer Assessment/Treatment    Transfers, Sit-Stand [place] verbal cues required;supervision required  -LR      Transfers, Stand-Sit [place] verbal cues required;supervision required  -LR      Transfers, Sit-Stand-Sit, Assist Device rolling walker  -LR      Transfer, Safety Issues sequencing ability decreased;step length decreased;weight-shifting ability decreased  -LR      Transfer, Impairments ROM decreased;strength decreased;pain  -LR      Transfer, Comment Verbal cues for correct hand placement with t/f, to step L LE out before t/f to avoid flexing at hips past 90 degrees.   -LR      Recorded by [LR] Nahomy Montgomery, PT      Gait Assessment/Treatment    Gait, [place] Level verbal cues required;stand by assist  -LR      Gait, Assistive Device rolling walker  -LR      Gait, Distance (Feet) 600  -LR      Gait, Gait Pattern Analysis swing-through gait  -LR      Gait, Gait Deviations left:;antalgic;forward flexed posture;step length decreased;toe-to-floor clearance decreased;weight-shifting ability  decreased  -LR      Gait, Safety Issues sequencing ability decreased;step length decreased;weight-shifting ability decreased  -LR      Gait, Impairments ROM decreased;strength decreased;pain  -LR      Gait, Comment Patient ambulated with step through gait pattern at slow pace. Verbal cues for increased L LE weight bearing/stance phase, decreased UE weight bearing, and upright posture. Improved with cues for correction. Gait limited by fatigue.   -LR      Recorded by [LR] Nahomy Montgomery, PT      Stairs Assessment/Treatment    Number of Stairs 10  -LR      Stairs, Handrail Location left side (ascending)  -LR      Stairs, [place] Level verbal cues required;contact guard assist  -LR      Stairs, Assistive Device axillary crutches  -LR      Stairs, Technique Used step to step (ascending);step to step (descending)  -LR      Stairs, Safety Issues sequencing ability decreased;weight-shifting ability decreased  -LR      Stairs, Impairments ROM decreased;strength decreased;pain  -LR      Stairs, Comment Patient climbed 5 steps with 2 handrails and 5 steps with one handrail and crutch on opposite side. Verbal cues to take one step at a time and to step up with strong LE first and down with weak LE first. Verbal cues for correct placement and sequencing of crutch.   -LR      Recorded by [LR] Nahomy Montgomery, PT      Therapy Exercises    Exercise Protocols total hip  -LR      Total Hip Exercises left:;15 reps;completed protocol;ankle pumps/circles;quad set;glut set;heel slides;hip abduction;SAQ;LAQ;SLR   cues for technique; no assist required.   -LR      Recorded by [LR] Nahomy Montgomery, PT      Positioning and Restraints    Pre-Treatment Position in bed  -LR      Post Treatment Position chair  -LR      In Chair notified nsg;sitting;call light within reach;encouraged to call for assist;with nsg  -LR      Recorded by [LR] Nahomy Montgomery, PT        User Key  (r) = Recorded By, (t) = Taken By, (c) =  Cosigned By    Initials Name Effective Dates    LR Nahomy Montgomery, PT 06/19/15 -           PT Recommendation and Plan  Anticipated Equipment Needs At Discharge: other (see comments) (none)  Anticipated Discharge Disposition: home with assist, home with home health  Demonstrates Need for Referral to Another Service: home health care  Planned Therapy Interventions: balance training, bed mobility training, gait training, home exercise program, patient/family education, stair training, strengthening, transfer training  PT Frequency: 2 times/day  Plan of Care Review  Plan Of Care Reviewed With: patient  Progress: progress toward functional goals as expected  Outcome Summary/Follow up Plan: Patient ambulated 600 feet with RW and good step through gait pattern. Completed stair training with no difficulty. Nearly independent with all mobility. Patient has been d/c home with family and HHPT.           Outcome Measures       12/12/17 0845 12/11/17 1308       How much help from another person do you currently need...    Turning from your back to your side while in flat bed without using bedrails? 4  -LR 3  -LM     Moving from lying on back to sitting on the side of a flat bed without bedrails? 4  -LR 3  -LM     Moving to and from a bed to a chair (including a wheelchair)? 3  -LR 3  -LM     Standing up from a chair using your arms (e.g., wheelchair, bedside chair)? 3  -LR 3  -LM     Climbing 3-5 steps with a railing? 3  -LR 3  -LM     To walk in hospital room? 3  -LR 3  -LM     AM-PAC 6 Clicks Score 20  -LR 18  -LM     Functional Assessment    Outcome Measure Options AM-PAC 6 Clicks Basic Mobility (PT)  -LR AM-PAC 6 Clicks Basic Mobility (PT)  -LM       User Key  (r) = Recorded By, (t) = Taken By, (c) = Cosigned By    Initials Name Provider Type    LR Nahomy Montgomery, PT Physical Therapist    LM Elke Cuadra PT Physical Therapist           Time Calculation:         PT Charges       12/12/17 2142          Time  Calculation    Start Time 0845  -LR      PT Received On 12/12/17  -LR      PT Goal Re-Cert Due Date 12/21/17  -LR      Time Calculation- PT    Total Timed Code Minutes- PT 38 minute(s)  -LR        User Key  (r) = Recorded By, (t) = Taken By, (c) = Cosigned By    Initials Name Provider Type    LR Nahomy Montgomery, PT Physical Therapist          Therapy Charges for Today     Code Description Service Date Service Provider Modifiers Qty    32871994913 HC GAIT TRAINING EA 15 MIN 12/12/2017 Nahomy Montgomery, PT GP 2    47150653431 HC PT THER PROC EA 15 MIN 12/12/2017 Nahomy Montgomery, PT GP 1          PT G-Codes  Outcome Measure Options: AM-PAC 6 Clicks Basic Mobility (PT)    PT Discharge Summary  Anticipated Discharge Disposition: home with assist, home with home health  Reason for Discharge: Discharge from facility  Outcomes Achieved: Patient able to partially acheive established goals  Discharge Destination: Home with assist, Home with home health    Nahomy Montgomery, PT  12/12/2017

## 2017-12-12 NOTE — PLAN OF CARE
Problem: Patient Care Overview (Adult)  Goal: Plan of Care Review  Outcome: Ongoing (interventions implemented as appropriate)    12/12/17 0845   Coping/Psychosocial Response Interventions   Plan Of Care Reviewed With patient   Patient Care Overview   Progress progress toward functional goals as expected   Outcome Evaluation   Outcome Summary/Follow up Plan Patient ambulated 600 feet with RW and good step through gait pattern. Completed stair training with no difficulty. Nearly independent with all mobility. Patient has been d/c home with family and HHPT.          Problem: Inpatient Physical Therapy  Goal: Bed Mobility Goal LTG- PT  Outcome: Unable to achieve outcome(s) by discharge Date Met:  12/12/17 12/12/17 0845   Bed Mobility PT LTG   Bed Mobility PT LTG, Date Established 12/11/17   Bed Mobility PT LTG, Time to Achieve 3 days   Bed Mobility PT LTG, Activity Type supine to sit/sit to supine   Bed Mobility PT LTG, [place] Level independent   Bed Mobility PT LTG, Date Goal Reviewed 12/12/17   Bed Mobility PT LTG, Outcome goal not met   Bed Mobility PT LTG, Reason Goal Not Met discharged from facility       Goal: Transfer Training Goal 1 LTG- PT  Outcome: Unable to achieve outcome(s) by discharge Date Met:  12/12/17 12/12/17 0845   Transfer Training PT LTG   Transfer Training PT LTG, Date Established 12/11/17   Transfer Training PT LTG, Time to Achieve 3 days   Transfer Training PT LTG, Activity Type sit to stand/stand to sit   Transfer Training PT LTG, [place] Level conditional independence   Transfer Training PT LTG, Assist Device walker, rolling   Transfer Training PT LTG, Date Goal Reviewed 12/12/17   Transfer Training PT LTG, Outcome goal not met   Transfer Training PT LTG, Reason Goal Not Met discharged from facility       Goal: Gait Training Goal LTG- PT  Outcome: Unable to achieve outcome(s) by discharge Date Met:  12/12/17 12/12/17 0845   Gait Training PT LTG   Gait Training Goal PT LTG,  Date Established 12/11/17   Gait Training Goal PT LTG, Time to Achieve 3 days   Gait Training Goal PT LTG, [place] Level conditional independence   Gait Training Goal PT LTG, Assist Device walker, rolling   Gait Training Goal PT LTG, Distance to Achieve 500 feet   Gait Training Goal PT LTG, Date Goal Reviewed 12/12/17   Gait Training Goal PT LTG, Outcome goal partially met  (distance achieved but not at level of assist)   Gait Training Goal PT LTG, Reason Goal Not Met discharged from facility       Goal: Stair Training Goal STG- PT  Outcome: Unable to achieve outcome(s) by discharge Date Met:  12/12/17 12/12/17 0845   Stair Training PT STG   Stair Training Goal PT STG, Date Established 12/11/17   Stair Training Goal PT STG, Time to Achieve 3 days   Stair Training Goal PT STG, Number of Steps 1   Stair Training Goal PT STG, [place] Level conditional independence   Stair Training Goal PT STG, Assist Device walker, rolling   Stair Training Goal PT STG, Date Goal Reviewed 12/12/17   Stair Training Goal PT STG, Outcome goal not met   Stair Training Goal PT STG, Reason Goal Not Met discharged from facility       Goal: Stair Training Goal LTG- PT  Outcome: Unable to achieve outcome(s) by discharge Date Met:  12/12/17 12/12/17 0845   Stair Training PT LTG   Stair Training Goal PT LTG, Date Established 12/11/17   Stair Training Goal PT LTG, Time to Achieve 3 days   Stair Training Goal PT LTG, Number of Steps 14   Stair Training Goal PT LTG, [place] Level supervision required   Stair Training Goal PT LTG, Assist Device 1 handrail   Stair Training Goal PT LTG, Date Goal Reviewed 12/12/17   Stair Training Goal PT LTG, Outcome goal not met   Stair Training Goal PT LTG, Reason Goal Not Met discharged from facility

## 2017-12-12 NOTE — PLAN OF CARE
Problem: Patient Care Overview (Adult)  Goal: Plan of Care Review  Outcome: Ongoing (interventions implemented as appropriate)    12/12/17 1107 12/12/17 1111   Coping/Psychosocial Response Interventions   Plan Of Care Reviewed With --  patient   Patient Care Overview   Progress --  unable to show any progress toward functional goals   Outcome Evaluation   Outcome Summary/Follow up Plan OT niels complete. Pt met functional goals for LB dressing, education, and transfers. OT educated pt on use of AE and hip precautions. Recommend discharge home with assist and HH.  --          Problem: Inpatient Occupational Therapy  Goal: Bed Mobility Goal LTG- OT  Outcome: Unable to achieve outcome(s) by discharge Date Met:  12/12/17 12/12/17 1107   Bed Mobility OT LTG   Bed Mobility OT LTG, Date Established 12/12/17   Bed Mobility OT LTG, Time to Achieve 5 days   Bed Mobility OT LTG, Activity Type scoot/bridge;supine to sit/sit to supine   Bed Mobility OT LTG, [place] Level conditional independence   Bed Mobility OT LTG, Assist Device bed rails;leg    Bed Mobility OT LTG, Outcome goal not met   Bed Mobility OT LTG, Reason Goal Not Met discharged from facility       Goal: Transfer Training Goal 1 LTG- OT  Outcome: Outcome(s) achieved Date Met:  12/12/17 12/12/17 1107   Transfer Training OT LTG   Transfer Training OT LTG, Date Established 12/12/17   Transfer Training OT LTG, Time to Achieve 5 days   Transfer Training OT LTG, Activity Type sit to stand/stand to sit   Transfer Training OT LTG, [place] Level contact guard assist;verbal cues required   Transfer Training OT LTG, Assist Device walker, rolling   Transfer Training OT LTG, Outcome goal met       Goal: Patient Education Goal LTG- OT  Outcome: Outcome(s) achieved Date Met:  12/12/17 12/12/17 1107   Patient Education OT LTG   Patient Education OT LTG, Date Established 12/12/17   Patient Education OT LTG, Education Type precautions per surgeon;posture/body  mechanics;1 hand/flash technique;adaptive equipment mgmt   Patient Education OT LTG Outcome goal met       Goal: LB Dressing Goal LTG- OT  Outcome: Outcome(s) achieved Date Met:  12/12/17 12/12/17 1107   LB Dressing OT LTG   LB Dressing Goal OT LTG, Date Established 12/12/17   LB Dressing Goal OT LTG, Time to Achieve 5 days   LB Dressing Goal OT LTG, Activity Type pt will don/doff socks   LB Dressing Goal OT LTG, [place] Level minimum assist (75% patient effort);verbal cues required   LB Dressing Goal OT LTG, Adaptive Equipment sock-aid;reacher   LB Dressing Goal OT LTG, Outcome goal met

## 2017-12-12 NOTE — PLAN OF CARE
Problem: Patient Care Overview (Adult)  Goal: Plan of Care Review  Outcome: Ongoing (interventions implemented as appropriate)    Problem: Pain, Acute (Adult)  Goal: Identify Related Risk Factors and Signs and Symptoms  Outcome: Ongoing (interventions implemented as appropriate)    12/12/17 3099   Pain, Acute   Related Risk Factors (Acute Pain) persistent pain;knowledge deficit

## 2017-12-12 NOTE — PLAN OF CARE
Problem: Patient Care Overview (Adult)  Goal: Plan of Care Review  Outcome: Unable to achieve outcome(s) by discharge Date Met:  12/12/17 12/12/17 1107   Coping/Psychosocial Response Interventions   Plan Of Care Reviewed With patient   Patient Care Overview   Progress improving   Outcome Evaluation   Outcome Summary/Follow up Plan OT niels complete. Pt met functional goals for LB dressing, education, and transfers. OT educated pt on use of AE and hip precautions. Recommend discharge home with assist and HH.          Problem: Inpatient Occupational Therapy  Goal: Bed Mobility Goal LTG- OT  Outcome: Ongoing (interventions implemented as appropriate)    12/12/17 1107   Bed Mobility OT LTG   Bed Mobility OT LTG, Date Established 12/12/17   Bed Mobility OT LTG, Time to Achieve 5 days   Bed Mobility OT LTG, Activity Type scoot/bridge;supine to sit/sit to supine   Bed Mobility OT LTG, [place] Level conditional independence   Bed Mobility OT LTG, Assist Device bed rails;leg    Bed Mobility OT LTG, Outcome goal not met   Bed Mobility OT LTG, Reason Goal Not Met discharged from facility       Goal: Transfer Training Goal 1 LTG- OT  Outcome: Outcome(s) achieved Date Met:  12/12/17 12/12/17 1107   Transfer Training OT LTG   Transfer Training OT LTG, Date Established 12/12/17   Transfer Training OT LTG, Time to Achieve 5 days   Transfer Training OT LTG, Activity Type sit to stand/stand to sit   Transfer Training OT LTG, [place] Level contact guard assist;verbal cues required   Transfer Training OT LTG, Assist Device walker, rolling   Transfer Training OT LTG, Outcome goal met       Goal: Patient Education Goal LTG- OT  Outcome: Outcome(s) achieved Date Met:  12/12/17 12/12/17 1107   Patient Education OT LTG   Patient Education OT LTG, Date Established 12/12/17   Patient Education OT LTG, Education Type precautions per surgeon;posture/body mechanics;1 hand/flash technique;adaptive equipment mgmt   Patient  Education OT LTG Outcome goal met       Goal: LB Dressing Goal LTG- OT  Outcome: Outcome(s) achieved Date Met:  12/12/17 12/12/17 1107   LB Dressing OT LTG   LB Dressing Goal OT LTG, Date Established 12/12/17   LB Dressing Goal OT LTG, Time to Achieve 5 days   LB Dressing Goal OT LTG, Activity Type pt will don/doff socks   LB Dressing Goal OT LTG, [place] Level minimum assist (75% patient effort);verbal cues required   LB Dressing Goal OT LTG, Adaptive Equipment sock-aid;reacher   LB Dressing Goal OT LTG, Outcome goal met

## 2017-12-12 NOTE — THERAPY DISCHARGE NOTE
Acute Care - Occupational Therapy Initial Eval/Discharge  [facility]     Patient Name: Prashanth Paz IV  : 1950  MRN: [id number]  Today's Date: 2017  Onset of Illness/Injury or Date of Surgery Date: 17  Date of Referral to OT: 17  Referring Physician: MD Darya      Admit Date: 2017       ICD-10-CM ICD-9-CM   1. Impaired functional mobility, balance, gait, and endurance Z74.09 V49.89   2. Primary osteoarthritis of left hip M16.12 715.15   3. Status post total replacement of left hip Z96.642 V43.64   4. Impaired mobility and ADLs Z74.09 799.89     Patient Active Problem List   Diagnosis   • Primary osteoarthritis of left hip   • Status post total replacement of left hip   • Prediabetes     Past Medical History:   Diagnosis Date   • Arthritis    • Kidney stone      Past Surgical History:   Procedure Laterality Date   • HIP ARTHROPLASTY     • HIP SURGERY Right     [facility]          OT ASSESSMENT FLOWSHEET (last 72 hours)      OT Evaluation       17 1112 17 1022 17 1021 17 0950 17 0845    Rehab Evaluation    Document Type    evaluation;discharge summary  -HK therapy note (daily note);discharge summary  -LR    Subjective Information    agree to therapy;complains of;pain  -HK agree to therapy;complains of;pain  -LR    Patient Effort, Rehab Treatment    excellent  -HK excellent  -LR    Symptoms Noted During/After Treatment    none  -HK none  -LR    General Information    Patient Profile Review    yes  -HK     Onset of Illness/Injury or Date of Surgery Date    17  -HK     Referring Physician    MD Darya  -HK     General Observations    Pt received sitting up in chair with brother at bedside.  -HK     Pertinent History Of Current Problem    Pt is a 67 YOM who presents for surgical management of L hip, groin, and leg pain that failed to improve with conservative management. Pt is POD #1 s/p L poster [place]. 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   -HK     Precautions/Limitations    fall precautions;hip precautions- left  -HK fall precautions;hip precautions- left  -LR    Prior Level of Function    min assist:;all household mobility;community mobility;gait;transfer;bed mobility;ADL's;home management;cooking;cleaning;driving;shopping  -HK     Equipment Currently Used at Home  walker, rolling;crutches;bath bench;raised toilet  -MB  walker, rolling;crutches;bath bench;raised toilet  -HK     Plans/Goals Discussed With    patient;agreed upon  -HK     Risks Reviewed    patient:;LOB;nausea/vomiting;dizziness;increased discomfort;change in vital signs;increased drainage;lines disloged  -HK     Benefits Reviewed    patient:;improve function;increase independence;increase strength;increase balance;decrease pain;decrease risk of DVT;improve skin integrity;increase knowledge  -HK     Barriers to Rehab    none identified  -HK     Living Environment    Lives With  spouse  -MB  spouse  -HK     Living Arrangements  house  -MB  house  -HK     Home Accessibility    bed and bath are not on the first floor;stairs to enter home;stairs within home  -HK     Number of Stairs to Enter Home    1  -HK     Number of Stairs Within Home    14  -HK     Stair Railings at Home    inside, present on left side  -HK     Transportation Available  car;family or friend will provide  -MB       Clinical Impression    Date of Referral to OT    12/11/17  -HK     OT Diagnosis    Decreased independence with ADLs and Mobility.   -HK     Patient/Family Goals Statement    Pt would like to return home this date.   -HK     Criteria for Skilled Therapeutic Interventions Met    treatment indicated;yes  -HK     Rehab Potential    good, to achieve stated therapy goals  -HK     Therapy Frequency    evaluation only  -HK     Anticipated Equipment Needs At Discharge    other (see comments)   TBD  -HK     Anticipated Discharge  Disposition home with assist;home with home health  -HK   home with assist;home with home health  -HK     Functional Level Prior    Ambulation   0-->independent  -MB      Transferring   0-->independent  -MB      Toileting   0-->independent  -MB      Bathing   0-->independent  -MB      Dressing   0-->independent  -MB      Eating   0-->independent  -MB      Communication   0-->understands/communicates without difficulty  -MB      Swallowing   0-->swallows foods/liquids without difficulty  -MB      Pain Assessment    Pain Assessment    0-10  -HK 0-10  -LR    Pain Score    0  -HK 3  -LR    Post Pain Score    0  -HK 3  -LR    Pain Type     Acute pain  -LR    Pain Location     Hip  -LR    Pain Orientation     Left  -LR    Pain Intervention(s)     Repositioned;Ambulation/increased activity  -LR    Vision Assessment/Intervention    Visual Impairment    WFL  -HK     Cognitive Assessment/Intervention    Current Cognitive/Communication Assessment    functional  -HK functional  -LR    Orientation Status    oriented x 4  -HK oriented x 4;required verbal cueing (specifiy in comments)  -LR    Follows Commands/Answers Questions    100% of the time;able to follow single-step instructions  -% of the time;able to follow single-step instructions;needs cueing;needs repetition  -LR    Personal Safety    WNL/WFL  -HK WNL/WFL  -LR    Personal Safety Interventions    fall prevention program maintained;gait belt;nonskid shoes/slippers when out of bed  -HK     ROM (Range of Motion)    General ROM    no range of motion deficits identified  -HK     MMT (Manual Muscle Testing)    General MMT Assessment    no strength deficits identified  -HK     Mobility Assessment/Training    Extremity Weight-Bearing Status    left lower extremity  -HK left lower extremity  -LR    Left Lower Extremity Weight-Bearing    weight-bearing as tolerated  -HK weight-bearing as tolerated  -LR    Bed Mobility, Assessment/Treatment    Bed Mobility, Assistive Device      head of bed elevated;bed rails  -LR    Bed Mob, Supine to Sit, [place]     verbal cues required;conditional independence  -LR    Bed Mob, Sit to Supine, [place]     not tested   UIC at end of treatment.   -LR    Bed Mobility, Safety Issues     decreased use of legs for bridging/pushing  -LR    Bed Mobility, Impairments     ROM decreased;strength decreased;pain  -LR    Bed Mobility, Comment    pt received [facility]. OT issued leg  and educated pt on use for bed mobility.   -HK Verbal cues to move LEs towards EOB and to push up from bed to raise trunk into sitting and to scoot hips out to get feet on floor. Cues to not flex at hips past 90 degrees. Denies dizziness upon sitting.   -LR    Transfer Assessment/Treatment    Transfers, Sit-Stand [place]    supervision required;verbal cues required  -HK verbal cues required;supervision required  -LR    Transfers, Stand-Sit [place]    supervision required;verbal cues required  -HK verbal cues required;supervision required  -LR    Transfers, Sit-Stand-Sit, Assist Device    rolling walker  -HK rolling walker  -LR    Transfer, Safety Issues    step length decreased;weight-shifting ability decreased  -HK sequencing ability decreased;step length decreased;weight-shifting ability decreased  -LR    Transfer, Impairments    ROM decreased;strength decreased;pain  -HK ROM decreased;strength decreased;pain  -LR    Transfer, Comment    Pt displaing good safety awareness. OT educated pt on hip precautions and how to maintain during transfers  -HK Verbal cues for correct hand placement with t/f, to step L LE out before t/f to avoid flexing at hips past 90 degrees.   -LR    Functional Mobility    Functional Mobility- Ind. Level    supervision required;verbal cues required  -HK     Functional Mobility- Device    rolling walker  -HK     Functional Mobility-Distance (Feet)    30  -HK     Functional Mobility- Comment    pt ambulated from chair, to door, and back to  chair.   -HK     Stairs Assessment/Treatment    Number of Stairs     10  -LR    Stairs, Handrail Location     left side (ascending)  -LR    Stairs, [place] Level     verbal cues required;contact guard assist  -LR    Stairs, Assistive Device     axillary crutches  -LR    Stairs, Technique Used     step to step (ascending);step to step (descending)  -LR    Stairs, Safety Issues     sequencing ability decreased;weight-shifting ability decreased  -LR    Stairs, Impairments     ROM decreased;strength decreased;pain  -LR    Stairs, Comment     Patient climbed 5 steps with 2 handrails and 5 steps with one handrail and crutch on opposite side. Verbal cues to take one step at a time and to step up with strong LE first and down with weak LE first. Verbal cues for correct placement and sequencing of crutch.   -LR    Lower Body Bathing Assessment/Training    LB Bathing Assess/Train, Comment    OT educated pt on LH sponge for LB bathing. Pt states she will purchase if needed.   -HK     Lower Body Dressing Assessment/Training    LB Dressing Assess/Train, Clothing Type    doffing:;donning:;slipper socks  -     LB Dressing Assess/Train, Assist Device    reacher;sock-aid  -     LB Dressing Assess/Train, Position    sitting  -HK     LB Dressing Assess/Train, [place]    supervision required;verbal cues required  -HK     LB Dressing Assess/Train, Impairments    ROM decreased;pain  -HK     LB Dressing Assess/Train, Comment    OT educated pt on use of AE for LB dressing and issued sock aid. Pt has reacher and shoe horn at home.   -HK     Grooming Assessment/Training    Grooming Assess/Train, Comment    OT educated pt on performing sink side ADLs while maintaining hip precautions.   -     Balance Skills Training    Sitting-Level of Assistance    Independent  -HK     Sitting-Balance Support    Feet supported  -HK     Standing-Level of Assistance    Distant supervision  -     Therapy Exercises    Exercise Protocols      total hip  -LR    Total Hip Exercises     left:;15 reps;completed protocol;ankle pumps/circles;quad set;glut set;heel slides;hip abduction;SAQ;LAQ;SLR   cues for technique; no assist required.   -LR    General Therapy Interventions    Planned Therapy Interventions    adaptive equipment training;ADL retraining;bed mobility training;transfer training  -HK     Adaptive Equipment Training    OT educated pt on use of AE.   -HK     ADL Retraining    OT educated pt on completion of LB dressing with AE  -HK     Bed Mobility Training    OT issued leg  and educated pt on use of for bed mobility.  -HK     Transfer Training    OT issued lef  and educated pt on use for car transfers.  -HK     Positioning and Restraints    Pre-Treatment Position    sitting in chair/recliner  -HK in bed  -LR    Post Treatment Position    chair  -HK chair  -LR    In Chair    sitting;call light within reach;encouraged to call for assist;with family/caregiver   no exit alarm on entry  -HK notified nsg;sitting;call light within reach;encouraged to call for assist;with nsg  -LR      12/11/17 1308 12/11/17 0707             Rehab Evaluation    Document Type evaluation  -LM        Subjective Information agree to therapy;complains of;pain  -LM        Patient Effort, Rehab Treatment good  -LM        Symptoms Noted During/After Treatment none  -LM        General Information    Patient Profile Review yes  -LM        Onset of Illness/Injury or Date of Surgery Date 12/11/17  -LM        Referring Physician MD Darya  -LM        General Observations Pt received supine in bed with O2 via NC, IV intact, family at bedside.  -LM        Pertinent History Of Current Problem Pt presents for surgical management of left hip, groin and leg pain that failed to improve with conservative management. Pt has history of infection of right hip replacement which caused pt to become septic and required lengthy stay at [facility].  -LM         Precautions/Limitations hip precautions- left;fall precautions  -LM        Prior Level of Function min assist:;all household mobility;community mobility;gait;transfer;bed mobility;ADL's  -LM        Equipment Currently Used at Home walker, rolling;shower chair;grab bar;raised toilet;cane, quad  -LM        Plans/Goals Discussed With patient and family;agreed upon  -LM        Risks Reviewed patient and family:;LOB;nausea/vomiting;dizziness;increased discomfort;change in vital signs  -LM        Benefits Reviewed patient and family:;improve function;increase independence;increase strength;increase balance;decrease pain  -LM        Barriers to Rehab none identified  -LM        Living Environment    Lives With spouse  -LM spouse  -MY       Living Arrangements house  -LM house  -MY       Home Accessibility bed and bath are not on the first floor;stairs to enter home;stairs within home   walk-in shower  -LM no concerns  -MY       Number of Stairs to Enter Home 1  -LM        Number of Stairs Within Home 14  -LM        Stair Railings at Home inside, present on right side   no handrails outside  -LM        Type of Financial/Environmental Concern  none  -MY       Transportation Available  car;family or friend will provide  -MY       Living Environment Comment Pt lives in 2-story home with bed/bath on second floor. Daughter will be staying with patient to assist as needed.  -LM        Functional Level Prior    Ambulation  0-->independent  -MY       Transferring  0-->independent  -MY       Toileting  0-->independent  -MY       Bathing  0-->independent  -MY       Dressing  0-->independent  -MY       Eating  0-->independent  -MY       Communication  0-->understands/communicates without difficulty  -MY       Swallowing  0-->swallows foods/liquids without difficulty  -MY       Pain Assessment    Pain Assessment 0-10  -LM        Pain Score 2  -LM        Post Pain Score 2  -LM        Pain Type Acute pain  -LM        Pain Location Hip   -LM        Pain Orientation Left  -LM        Pain Intervention(s) Repositioned;Ambulation/increased activity  -LM        Response to Interventions tolerated  -LM        Vision Assessment/Intervention    Visual Impairment WFL  -LM        Cognitive Assessment/Intervention    Current Cognitive/Communication Assessment functional  -LM        Orientation Status oriented x 4  -LM        Follows Commands/Answers Questions 100% of the time;able to follow single-step instructions;needs cueing  -LM        Personal Safety WNL/WFL  -LM        Personal Safety Interventions fall prevention program maintained;gait belt;nonskid shoes/slippers when out of bed;supervised activity  -LM        ROM (Range of Motion)    General ROM lower extremity range of motion deficits identified   defer UE to OT  -LM        MMT (Manual Muscle Testing)    General MMT Assessment lower extremity strength deficits identified   defer UE to OT  -LM        General MMT Assessment Detail left LE not formally tested; right LE functionally 4+/5  -LM        Mobility Assessment/Training    Extremity Weight-Bearing Status left lower extremity  -LM        Left Lower Extremity Weight-Bearing weight-bearing as tolerated  -LM        Bed Mobility, Assessment/Treatment    Bed Mobility, Assistive Device head of bed elevated  -LM        Bed Mob, Supine to Sit, [place] supervision required;verbal cues required  -LM        Bed Mob, Sit to Supine, [place] not tested   Pt left UIC  -LM        Bed Mobility, Safety Issues decreased use of legs for bridging/pushing  -LM        Bed Mobility, Impairments strength decreased;pain  -LM        Bed Mobility, Comment Verbal cues for sequencing and to adhere to hip precautions. Pt able to independently raise trunk to sitting and then use bilateral UEs to move body towards EOB. Pt denied dizziness upon sitting EOB.   -LM        Transfer Assessment/Treatment    Transfers, Sit-Stand [place] contact guard assist;2 person  assist required;verbal cues required  -LM        Transfers, Stand-Sit [place] contact guard assist;2 person assist required;verbal cues required  -LM        Transfers, Sit-Stand-Sit, Assist Device rolling walker  -LM        Transfer, Safety Issues sequencing ability decreased;step length decreased;weight-shifting ability decreased  -LM        Transfer, Impairments ROM decreased;strength decreased;pain  -LM        Transfer, Comment Verbal cues to push from bed with at least one hand when standing, reach for chair armrests when sitting, step out left LE prior to t/f. Pt denied dizziness upon standing.  -LM        Motor Skills/Interventions    Additional Documentation Balance Skills Training (Group)  -LM        Balance Skills Training    Sitting-Level of Assistance Independent  -LM        Sitting-Balance Support Right upper extremity supported;Left upper extremity supported;Feet supported  -LM        Standing-Level of Assistance Contact guard  -LM        Static Standing Balance Support assistive device  -LM        Gait Balance-Level of Assistance Contact guard  -LM        Gait Balance Support assistive device  -LM        Therapy Exercises    Exercise Protocols total hip  -LM        Total Hip Exercises left:;10 reps;ankle pumps/circles;quad set;glut set;LAQ  -LM        Sensory Assessment/Intervention    Sensory Impairment --   Pt denies n/t in bilateral LEs; able to actively DF L ankle  -LM        Positioning and Restraints    Pre-Treatment Position in bed  -LM        Post Treatment Position chair  -LM        In Chair notified nsg;reclined;sitting;call light within reach;encouraged to call for assist;exit alarm on;with family/caregiver;legs elevated  -LM        General LE Assessment    ROM hip, left: LE ROM deficit  -LM        ROM Detail left hip ROM impaired 25%  -LM        Lower Extremity    Lower Ext Manual Muscle Testing --  -LM          User Key  (r) = Recorded By, (t) = Taken By, (c) = Cosigned By    Initials  Name Effective Dates    LR Nahomy Montgomery, PT 06/19/15 -     MY Mojgan EMERSON Hutson-Antonio, RN 06/16/16 -     BERNADETTE Fiore, RN 10/26/17 -     LM Elke Cuadra, PT 06/09/17 -     HK Dacia Charles, OT 09/28/17 -           Occupational Therapy Education     Title: PT OT SLP Therapies (Active)     Topic: Occupational Therapy (Done)     Point: ADL training (Done)    Description: Instruct learner(s) on proper safety adaptation and remediation techniques during self care or transfers.   Instruct in proper use of assistive devices.    Learning Progress Summary    Learner Readiness Method Response Comment Documented by Status   Patient Acceptance E VU OT educated pt on ADL retraining with AE, safety precautions, and appropriate body mechanics to maintain hip precautions.  12/12/17 1106 Done               Point: Precautions (Done)    Description: Instruct learner(s) on prescribed precautions during self-care and functional transfers.    Learning Progress Summary    Learner Readiness Method Response Comment Documented by Status   Patient Acceptance E VU OT educated pt on ADL retraining with AE, safety precautions, and appropriate body mechanics to maintain hip precautions.  12/12/17 1106 Done               Point: Body mechanics (Done)    Description: Instruct learner(s) on proper positioning and spine alignment during self-care, functional mobility activities and/or exercises.    Learning Progress Summary    Learner Readiness Method Response Comment Documented by Status   Patient Acceptance E VU OT educated pt on ADL retraining with AE, safety precautions, and appropriate body mechanics to maintain hip precautions.  12/12/17 1106 Done                      User Key     Initials Effective Dates Name Provider Type Discipline     09/28/17 -  Dacia Charles, OT Occupational Therapist OT                OT Recommendation and Plan  Anticipated Equipment Needs At Discharge: other (see comments) (TBD)  Anticipated  Discharge Disposition: home with assist, home with home health  Planned Therapy Interventions: adaptive equipment training, ADL retraining, bed mobility training, transfer training  Therapy Frequency: evaluation only  Plan of Care Review  Plan Of Care Reviewed With: patient  Progress: unable to show any progress toward functional goals  Outcome Summary/Follow up Plan: OT niels complete. Pt met functional goals for LB dressing, education, and transfers. OT educated pt on use of AE and hip precautions. Recommend discharge home with assist  and HH.            OT Goals       12/12/17 1107          Bed Mobility OT LTG    Bed Mobility OT LTG, Date Established 12/12/17  -HK      Bed Mobility OT LTG, Time to Achieve 5 days  -HK      Bed Mobility OT LTG, Activity Type scoot/bridge;supine to sit/sit to supine  -HK      Bed Mobility OT LTG, [place] Level conditional independence  -HK      Bed Mobility OT LTG, Assist Device bed rails;leg   -HK      Bed Mobility OT LTG, Outcome goal not met  -HK      Bed Mobility OT LTG, Reason Goal Not Met discharged from facility  -HK      Transfer Training OT LTG    Transfer Training OT LTG, Date Established 12/12/17  -HK      Transfer Training OT LTG, Time to Achieve 5 days  -HK      Transfer Training OT LTG, Activity Type sit to stand/stand to sit  -HK      Transfer Training OT LTG, [place] Level contact guard assist;verbal cues required  -HK      Transfer Training OT LTG, Assist Device walker, rolling  -HK      Transfer Training OT LTG, Outcome goal met  -HK      Patient Education OT LTG    Patient Education OT LTG, Date Established 12/12/17  -HK      Patient Education OT LTG, Education Type precautions per surgeon;posture/body mechanics;1 hand/flash technique;adaptive equipment mgmt  -HK      Patient Education OT LTG Outcome goal met  -HK      LB Dressing OT LTG    LB Dressing Goal OT LTG, Date Established 12/12/17  -HK      LB Dressing Goal OT LTG, Time to Achieve 5 days   -HK      LB Dressing Goal OT LTG, Activity Type pt will don/doff socks  -HK      LB Dressing Goal OT LTG, [place] Level minimum assist (75% patient effort);verbal cues required  -HK      LB Dressing Goal OT LTG, Adaptive Equipment sock-aid;reacher  -HK      LB Dressing Goal OT LTG, Outcome goal met  -HK        User Key  (r) = Recorded By, (t) = Taken By, (c) = Cosigned By    Initials Name Provider Type     Dacia Charles, OT Occupational Therapist                Outcome Measures       12/12/17 0950 12/12/17 0845 12/11/17 1308    How much help from another person do you currently need...    Turning from your back to your side while in flat bed without using bedrails?  4  -LR 3  -LM    Moving from lying on back to sitting on the side of a flat bed without bedrails?  4  -LR 3  -LM    Moving to and from a bed to a chair (including a wheelchair)?  3  -LR 3  -LM    Standing up from a chair using your arms (e.g., wheelchair, bedside chair)?  3  -LR 3  -LM    Climbing 3-5 steps with a railing?  3  -LR 3  -LM    To walk in hospital room?  3  -LR 3  -LM    AM-PAC 6 Clicks Score  20  -LR 18  -LM    How much help from another is currently needed...    Putting on and taking off regular lower body clothing? 4  -HK      Bathing (including washing, rinsing, and drying) 3  -HK      Toileting (which includes using toilet bed pan or urinal) 4  -HK      Putting on and taking off regular upper body clothing 4  -HK      Taking care of personal grooming (such as brushing teeth) 4  -HK      Eating meals 4  -HK      Score 23  -HK      Functional Assessment    Outcome Measure Options AM-PAC 6 Clicks Daily Activity (OT)  -HK AM-PAC 6 Clicks Basic Mobility (PT)  -LR AM-PAC 6 Clicks Basic Mobility (PT)  -LM      User Key  (r) = Recorded By, (t) = Taken By, (c) = Cosigned By    Initials Name Provider Type    LR Nahomy Montgomery, PT Physical Therapist    LM Elke Cuadra, PT Physical Therapist    HK Dacia Charles, OT Occupational  Therapist          Time Calculation:         Time Calculation- OT       12/12/17 1113          Time Calculation- OT    OT Start Time 0950  -      OT Received On 12/12/17  -      OT Goal Re-Cert Due Date 12/22/17  -        User Key  (r) = Recorded By, (t) = Taken By, (c) = Cosigned By    Initials Name Provider Type     Dacia Charles OT Occupational Therapist          Therapy Charges for Today     Code Description Service Date Service Provider Modifiers Qty    [id number]  OT EVAL MOD COMPLEXITY 2 12/12/2017 Dacia Charles OT GO 1    [id number]  OT THERAPEUTIC ACT EA 15 MIN 12/12/2017 Dacia Cahrles OT GO 2               OT Discharge Summary  Anticipated Discharge Disposition: home with assist, home with home health  Reason for Discharge: Discharge from facility  Outcomes Achieved: Patient able to partially acheive established goals  Discharge Destination: Home with assist, Home with home health    Dacia Charles OT  12/12/2017

## 2017-12-20 DIAGNOSIS — M16.12 PRIMARY OSTEOARTHRITIS OF LEFT HIP: ICD-10-CM

## 2017-12-20 RX ORDER — MELOXICAM 15 MG/1
TABLET ORAL
Qty: 60 TABLET | Refills: 0 | Status: SHIPPED | OUTPATIENT
Start: 2017-12-20 | End: 2020-10-01

## 2018-01-05 ENCOUNTER — OFFICE VISIT (OUTPATIENT)
Dept: ORTHOPEDIC SURGERY | Facility: CLINIC | Age: 68
End: 2018-01-05

## 2018-01-05 DIAGNOSIS — Z96.642 STATUS POST TOTAL REPLACEMENT OF LEFT HIP: Primary | ICD-10-CM

## 2018-01-05 PROCEDURE — 99024 POSTOP FOLLOW-UP VISIT: CPT | Performed by: ORTHOPAEDIC SURGERY

## 2018-01-05 RX ORDER — TRAMADOL HYDROCHLORIDE 50 MG/1
50 TABLET ORAL EVERY 6 HOURS PRN
Qty: 60 TABLET | Refills: 0 | Status: SHIPPED | OUTPATIENT
Start: 2018-01-05 | End: 2018-02-27 | Stop reason: SDUPTHER

## 2018-01-05 NOTE — PROGRESS NOTES
Orthopaedic Clinic Note:  Hip Post Op    Chief Complaint   Patient presents with   • Post-op     3 weeks status post: Left Total Hip Arthroplasty 12/11/2017        HPI    Mr. Paz is 3  week(s) s/p Left total hip arthroplasty. Rates pain 4/10. He is ambulating with no assistive device and is taking tramadol for pain control. He denies fevers, chills, or constitutional symptoms. He is continuing outpatient PT. Patient is improving overall.  He is extremely happy with his progress to date.  His mobility is greatly improved.  He denies any complications.  Patient states he walked a mile yesterday at an indoor track facility without any problems.    Past Medical History:   Diagnosis Date   • Arthritis    • Kidney stone       Past Surgical History:   Procedure Laterality Date   • HIP ARTHROPLASTY     • HIP SURGERY Right     [facility]   • TOTAL HIP ARTHROPLASTY Left 12/11/2017    Procedure: TOTAL HIP ARTHROPLASTY LEFT;  Surgeon: Reed Lackey MD;  Location: [facility];  Service:       Family History   Problem Relation Age of Onset   • Cancer Mother    • Cancer Father      Social History     Social History   • Marital status:      Spouse name: N/A   • Number of children: N/A   • Years of education: N/A     Occupational History   • Not on file.     Social History Main Topics   • Smoking status: Former Smoker     Packs/day: 1.00     Years: 10.00     Types: Cigarettes   • Smokeless tobacco: Never Used   • Alcohol use Yes      Comment: occasional   • Drug use: No   • Sexual activity: Defer     Other Topics Concern   • Not on file     Social History Narrative      Current Outpatient Prescriptions on File Prior to Visit   Medication Sig Dispense Refill   • aspirin  MG EC tablet Take 1 tablet by mouth Every 12 (Twelve) Hours. For 1 month 60 tablet 0   • cephalexin (KEFLEX) 500 MG capsule Take 500 mg by mouth 3 (Three) Times a Day.  1   • docusate sodium 100 MG capsule Take 100 mg by mouth 2 (Two) Times a  Day. 60 capsule 0   • meloxicam (MOBIC) 15 MG tablet TAKE ONE TABLET BY MOUTH EVERY DAY 60 tablet 0     No current facility-administered medications on file prior to visit.       No Known Allergies     Review of Systems   Constitutional: Negative.    HENT: Negative.    Eyes: Negative.    Respiratory: Negative.    Cardiovascular: Negative.    Gastrointestinal: Negative.    Endocrine: Negative.    Genitourinary: Negative.    Musculoskeletal: Negative.         Joint Pain    Skin: Negative.    Allergic/Immunologic: Negative.    Neurological: Negative.    Hematological: Negative.    Psychiatric/Behavioral: Negative.         Physical Exam  There were no vitals taken for this visit.    There is no height or weight on file to calculate BMI.    GENERAL APPEARANCE: awake, alert, oriented, in no acute distress  LUNGS:  breathing nonlabored  EXTREMITIES: no clubbing, cyanosis  PERIPHERAL PULSES: palpable dorsalis pedis and posterior tibial pulses bilaterally.    GAIT:  Trendelenberg            Hip Exam:  Left    RANGE OF MOTION:  EXTENSION/FLEXION:  normal (0-110 degrees)  IR:  20  ER:  35  PAIN WITH HIP MOTION:  yes laterally at incision area only   PAIN WITH LOGROLL:  no     STRENGTH:  ABDUCTOR:  4/5  ADDUCTOR:  5/5  HIP FLEXION:  5/5    GREATER TROCHANTER BURSAL PAIN:  yes    SENSATION TO LIGHT TOUCH:  DEEP PERONEAL/SUPERFICIAL PERONEAL/SURAL/SAPHENOUS/TIBIAL:   intact    EDEMA:  no  ERYTHEMA:  no  WOUNDS/INCISIONS:  yes, posterior lateral hip incision is healing well with no erythema or drainage  _______________________________________________________________  _______________________________________________________________    RADIOGRAPHIC FINDINGS:   Indication: Status post left total hip arthroplasty     Comparison: Todays xrays were compared to previous xrays from 12/11/17     AP pelvis:Left: Demonstrate a well positioned total hip without evidence of wear, loosening, fracture or osteolysis, femoral head is concentrically  reduced within the acetabulum and No significant changes compared to prior radiographs.        Assessment/Plan:   Diagnosis Plan   1. Status post total replacement of left hip  XR Pelvis 1 or 2 View     Patient is doing well 3 weeks status post left total hip arthroplasty.  I encouraged him to continue working on hip abductor strengthening and gait training.  His residual limp is secondary to abductor muscle weakness which will improve with continued strengthening.  He is to continue the aspirin for an additional week.  He is continue the posterior hip precautions for an additional 3 weeks.  I will see him back in 3 weeks with repeat x-ray AP pelvis upon return.    Cristi Muir MA  01/05/18  9:33 AM

## 2018-01-30 ENCOUNTER — OFFICE VISIT (OUTPATIENT)
Dept: ORTHOPEDIC SURGERY | Facility: CLINIC | Age: 68
End: 2018-01-30

## 2018-01-30 DIAGNOSIS — Z96.642 STATUS POST TOTAL REPLACEMENT OF LEFT HIP: Primary | ICD-10-CM

## 2018-01-30 PROCEDURE — 99024 POSTOP FOLLOW-UP VISIT: CPT | Performed by: ORTHOPAEDIC SURGERY

## 2018-01-30 NOTE — PROGRESS NOTES
Orthopaedic Clinic Note:  Hip Post Op    Chief Complaint   Patient presents with   • Post-op     3 weeks - Left Total Hip Arthroplasty 12/11/2017        HPI    Mr. Paz is 6  week(s) s/p Left total hip arthroplasty. Rates pain 0/10. He is ambulating with no assistive device and is taking nothing for pain control. He denies fevers, chills, or constitutional symptoms. He has completed outpatient PT. Patient is improving overall.  He is extremely happy with his progress to date.  He denies any complications..    Past Medical History:   Diagnosis Date   • Arthritis    • Kidney stone       Past Surgical History:   Procedure Laterality Date   • HIP ARTHROPLASTY     • HIP SURGERY Right     [facility]   • TOTAL HIP ARTHROPLASTY Left 12/11/2017    Procedure: TOTAL HIP ARTHROPLASTY LEFT;  Surgeon: Reed Lackey MD;  Location: [facility];  Service:       Family History   Problem Relation Age of Onset   • Cancer Mother    • Cancer Father      Social History     Social History   • Marital status:      Spouse name: N/A   • Number of children: N/A   • Years of education: N/A     Occupational History   • Not on file.     Social History Main Topics   • Smoking status: Former Smoker     Packs/day: 1.00     Years: 10.00     Types: Cigarettes   • Smokeless tobacco: Never Used   • Alcohol use Yes      Comment: occasional   • Drug use: No   • Sexual activity: Defer     Other Topics Concern   • Not on file     Social History Narrative      Current Outpatient Prescriptions on File Prior to Visit   Medication Sig Dispense Refill   • aspirin  MG EC tablet Take 1 tablet by mouth Every 12 (Twelve) Hours. For 1 month 60 tablet 0   • cephalexin (KEFLEX) 500 MG capsule Take 500 mg by mouth 3 (Three) Times a Day.  1   • docusate sodium 100 MG capsule Take 100 mg by mouth 2 (Two) Times a Day. 60 capsule 0   • meloxicam (MOBIC) 15 MG tablet TAKE ONE TABLET BY MOUTH EVERY DAY 60 tablet 0   • traMADol (ULTRAM) 50 MG tablet  Take 1 tablet by mouth Every 6 (Six) Hours As Needed for Moderate Pain . 60 tablet 0     No current facility-administered medications on file prior to visit.       No Known Allergies     Review of Systems     Physical Exam  There were no vitals taken for this visit.    There is no height or weight on file to calculate BMI.    GENERAL APPEARANCE: awake, alert, oriented, in no acute distress  LUNGS:  breathing nonlabored  EXTREMITIES: no clubbing, cyanosis  PERIPHERAL PULSES: palpable dorsalis pedis and posterior tibial pulses bilaterally.    GAIT:  Normal            Hip Exam:  Left    RANGE OF MOTION:  EXTENSION/FLEXION:  normal (0-110 degrees)  IR:  20  ER:  35  PAIN WITH HIP MOTION:  no  PAIN WITH LOGROLL:  no     STRENGTH:  ABDUCTOR:  5/5  ADDUCTOR:  5/5  HIP FLEXION:  5/5    GREATER TROCHANTER BURSAL PAIN:  no    SENSATION TO LIGHT TOUCH:  DEEP PERONEAL/SUPERFICIAL PERONEAL/SURAL/SAPHENOUS/TIBIAL:   intact    EDEMA:  no  ERYTHEMA:  no  WOUNDS/INCISIONS:  yes, well-healed posterior lateral hip incision  _______________________________________________________________  _______________________________________________________________    RADIOGRAPHIC FINDINGS:   Indication: Status post left total hip arthroplasty     Comparison: Todays xrays were compared to previous xrays from 1/5/18     AP pelvis: Left: Demonstrate a well positioned total hip without evidence of wear, loosening, fracture or osteolysis, femoral head is concentrically reduced within the acetabulum and No significant changes compared to prior radiographs.      Assessment/Plan:   Diagnosis Plan   1. Status post total replacement of left hip       Patient is doing well 6 weeks status post left total hip arthroplasty.  He may relaxant his hip precautions and slowly resume activity as tolerated without restrictions.  Will follow up in 6 weeks with repeat x-ray AP pelvis upon return.    Reed Lackey MD  01/30/18  7:53 AM

## 2018-02-27 RX ORDER — TRAMADOL HYDROCHLORIDE 50 MG/1
TABLET ORAL
Qty: 60 TABLET | Refills: 0 | Status: SHIPPED | OUTPATIENT
Start: 2018-02-27 | End: 2018-05-01 | Stop reason: SDUPTHER

## 2018-02-28 NOTE — TELEPHONE ENCOUNTER
Called prescription into [facility] in [place]. Called patient and let him know that it was called in. He understood and will call back with any further problems or questions.     Domi

## 2018-05-01 ENCOUNTER — OFFICE VISIT (OUTPATIENT)
Dept: ORTHOPEDIC SURGERY | Facility: CLINIC | Age: 68
End: 2018-05-01

## 2018-05-01 VITALS
HEART RATE: 75 BPM | BODY MASS INDEX: 22.9 KG/M2 | DIASTOLIC BLOOD PRESSURE: 79 MMHG | WEIGHT: 160 LBS | HEIGHT: 70 IN | SYSTOLIC BLOOD PRESSURE: 104 MMHG

## 2018-05-01 DIAGNOSIS — Z96.642 STATUS POST TOTAL REPLACEMENT OF LEFT HIP: Primary | ICD-10-CM

## 2018-05-01 DIAGNOSIS — Z96.641 STATUS POST TOTAL HIP REPLACEMENT, RIGHT: ICD-10-CM

## 2018-05-01 PROCEDURE — 99213 OFFICE O/P EST LOW 20 MIN: CPT | Performed by: ORTHOPAEDIC SURGERY

## 2018-05-01 RX ORDER — TRAMADOL HYDROCHLORIDE 50 MG/1
50 TABLET ORAL EVERY 6 HOURS PRN
Qty: 60 TABLET | Refills: 0 | Status: SHIPPED | OUTPATIENT
Start: 2018-05-01 | End: 2018-05-29 | Stop reason: SDUPTHER

## 2018-05-01 NOTE — PROGRESS NOTES
Orthopaedic Clinic Note: Hip Established Patient    Chief Complaint   Patient presents with   • Follow-up     5 months -   S/P Left Total Hip Arthroplasty 12/11/2017        HPI    Patient is 5 months status post left total hip arthroplasty.  He has no complaints in regards to his left hip.  He is having some mild residual right hip pain which was revised secondary to infection.  He rates his pain 2/10 on the pain scale today.  Overall he is doing extremely well.  He denies any complications.  He denies fevers chills or constitutional symptoms.  Overall he is doing much better.      Past Medical History:   Diagnosis Date   • Arthritis    • Kidney stone       Past Surgical History:   Procedure Laterality Date   • HIP ARTHROPLASTY     • HIP SURGERY Right     [facility]   • TOTAL HIP ARTHROPLASTY Left 12/11/2017    Procedure: TOTAL HIP ARTHROPLASTY LEFT;  Surgeon: Reed Lackey MD;  Location: [facility];  Service:       Family History   Problem Relation Age of Onset   • Cancer Mother    • Cancer Father      Social History     Social History   • Marital status:      Spouse name: N/A   • Number of children: N/A   • Years of education: N/A     Occupational History   • Not on file.     Social History Main Topics   • Smoking status: Former Smoker     Packs/day: 1.00     Years: 10.00     Types: Cigarettes   • Smokeless tobacco: Never Used   • Alcohol use Yes      Comment: occasional   • Drug use: No   • Sexual activity: Defer     Other Topics Concern   • Not on file     Social History Narrative   • No narrative on file      Current Outpatient Prescriptions on File Prior to Visit   Medication Sig Dispense Refill   • aspirin  MG EC tablet Take 1 tablet by mouth Every 12 (Twelve) Hours. For 1 month 60 tablet 0   • cephalexin (KEFLEX) 500 MG capsule Take 500 mg by mouth 3 (Three) Times a Day.  1   • docusate sodium 100 MG capsule Take 100 mg by mouth 2 (Two) Times a Day. 60 capsule 0   • meloxicam (MOBIC)  "15 MG tablet TAKE ONE TABLET BY MOUTH EVERY DAY 60 tablet 0   • traMADol (ULTRAM) 50 MG tablet TAKE ONE TABLET BY MOUTH EVERY 6 HOURS AS NEEDED FOR MODERATE PAIN 60 tablet 0     No current facility-administered medications on file prior to visit.       No Known Allergies     Review of Systems   Constitutional: Negative.    HENT: Negative.    Eyes: Negative.    Respiratory: Negative.    Cardiovascular: Negative.    Gastrointestinal: Negative.    Endocrine: Negative.    Genitourinary: Negative.    Musculoskeletal: Positive for arthralgias.   Skin: Negative.    Allergic/Immunologic: Negative.    Neurological: Negative.    Hematological: Negative.    Psychiatric/Behavioral: Negative.         Physical Exam  Blood pressure 104/79, pulse 75, height 177.8 cm (70\"), weight 72.6 kg (160 lb).    Body mass index is 22.96 kg/m².    GENERAL APPEARANCE: awake, alert, oriented, in no acute distress  LUNGS:  breathing nonlabored  EXTREMITIES: no clubbing, cyanosis  PERIPHERAL PULSES: palpable dorsalis pedis and posterior tibial pulses bilaterally.    GAIT:  Normal            Hip Exam:  Bilateral    RANGE OF MOTION:  EXTENSION/FLEXION:  normal (0-110 degrees)  IR (at 90 degrees of flexion):  20  ER (at 90 degrees of flexion):  40  PAIN WITH HIP MOTION:  no  PAIN WITH LOGROLL:  no     STINCHFIELD TEST: negative    STRENGTH:  ABDUCTOR:  5/5 on left, 4+/5 on right    ADDUCTOR:  5/5  HIP FLEXION:  5/5    GREATER TROCHANTER BURSAL PAIN:  no    SENSATION TO LIGHT TOUCH:  DEEP PERONEAL/SUPERFICIAL PERONEAL/SURAL/SAPHENOUS/TIBIAL:   intact    EDEMA:  no  ERYTHEMA:  no  WOUNDS/INCISIONS:  yes, Well-healed surgical incisions  _________________________________________________________________  _________________________________________________________________    RADIOGRAPHIC FINDINGS:   Indication: Status post bilateral total hip arthroplasty    Comparison: Todays xrays were compared to previous xrays from 11/30/18     AP pelvis: Right: " Demonstrate a well positioned total hip without evidence of wear, loosening, fracture or osteolysis, femoral head is concentrically reduced within the acetabulum and No significant changes compared to prior radiographs.;Left: Demonstrate a well positioned total hip without evidence of wear, loosening, fracture or osteolysis, femoral head is concentrically reduced within the acetabulum and No significant changes compared to prior radiographs.      Assessment/Plan:  No diagnosis found.  Patient is doing well 5 months status post left total hip arthroplasty.  He is continue activity as tolerated without restrictions.  A refill of his tramadol prescription #60 was provided today.  He'll follow-up in 6 months with repeat x-ray AP pelvis    MARY KATE Johnson(R)  05/01/18  7:46 AM

## 2018-05-29 ENCOUNTER — OFFICE VISIT (OUTPATIENT)
Dept: ORTHOPEDIC SURGERY | Facility: CLINIC | Age: 68
End: 2018-05-29

## 2018-05-29 VITALS — HEART RATE: 70 BPM | WEIGHT: 155.87 LBS | BODY MASS INDEX: 22.36 KG/M2 | OXYGEN SATURATION: 98 %

## 2018-05-29 DIAGNOSIS — Z96.642 STATUS POST TOTAL REPLACEMENT OF LEFT HIP: ICD-10-CM

## 2018-05-29 DIAGNOSIS — Z96.641 STATUS POST TOTAL HIP REPLACEMENT, RIGHT: ICD-10-CM

## 2018-05-29 DIAGNOSIS — Z96.649 S/P REVISION OF TOTAL HIP: Primary | ICD-10-CM

## 2018-05-29 PROCEDURE — 99213 OFFICE O/P EST LOW 20 MIN: CPT | Performed by: ORTHOPAEDIC SURGERY

## 2018-05-29 RX ORDER — TRAMADOL HYDROCHLORIDE 50 MG/1
50 TABLET ORAL EVERY 6 HOURS PRN
Qty: 60 TABLET | Refills: 0 | Status: SHIPPED | OUTPATIENT
Start: 2018-05-29 | End: 2018-08-06 | Stop reason: SDUPTHER

## 2018-05-29 NOTE — PROGRESS NOTES
Orthopaedic Clinic Note: Hip Established Patient    Chief Complaint   Patient presents with   • Right Hip - Follow-up     1 year f/u  [facility] 2014        HPI    Mr. Paz returns for follow-up of right revision hip arthroplasty.  It has been a year since his evaluation of the right hip.  He is doing well apart from some occasional abductor weakness and quad soreness.  He rates his pain a 1/10 on the pain scale he is taking tramadol as needed.  Otherwise he is having excellent functional activity with minimal limitations.  He denies fevers, chills, constitutional symptoms.  Overall he is doing well.      Past Medical History:   Diagnosis Date   • Arthritis    • Kidney stone       Past Surgical History:   Procedure Laterality Date   • HIP ARTHROPLASTY     • HIP SURGERY Right     [facility]   • TOTAL HIP ARTHROPLASTY Left 12/11/2017    Procedure: TOTAL HIP ARTHROPLASTY LEFT;  Surgeon: Reed Lackey MD;  Location: [facility];  Service:       Family History   Problem Relation Age of Onset   • Cancer Mother    • Cancer Father      Social History     Social History   • Marital status:      Spouse name: N/A   • Number of children: N/A   • Years of education: N/A     Occupational History   • Not on file.     Social History Main Topics   • Smoking status: Former Smoker     Packs/day: 1.00     Years: 10.00     Types: Cigarettes   • Smokeless tobacco: Never Used   • Alcohol use Yes      Comment: occasional   • Drug use: No   • Sexual activity: Defer     Other Topics Concern   • Not on file     Social History Narrative   • No narrative on file      Current Outpatient Prescriptions on File Prior to Visit   Medication Sig Dispense Refill   • aspirin  MG EC tablet Take 1 tablet by mouth Every 12 (Twelve) Hours. For 1 month 60 tablet 0   • cephalexin (KEFLEX) 500 MG capsule Take 500 mg by mouth 3 (Three) Times a Day.  1   • docusate sodium 100 MG capsule Take 100 mg by mouth 2 (Two) Times a Day. 60 capsule 0   •  meloxicam (MOBIC) 15 MG tablet TAKE ONE TABLET BY MOUTH EVERY DAY 60 tablet 0   • [DISCONTINUED] traMADol (ULTRAM) 50 MG tablet Take 1 tablet by mouth Every 6 (Six) Hours As Needed for Moderate Pain . 60 tablet 0     No current facility-administered medications on file prior to visit.       No Known Allergies     Review of Systems     Physical Exam  Pulse 70, weight 70.7 kg (155 lb 13.8 oz), SpO2 98 %.    Body mass index is 22.36 kg/m².    GENERAL APPEARANCE: awake, alert, oriented, in no acute distress  LUNGS:  breathing nonlabored  EXTREMITIES: no clubbing, cyanosis  PERIPHERAL PULSES: palpable dorsalis pedis and posterior tibial pulses bilaterally.    GAIT:  Normal            Hip Exam:  Right    RANGE OF MOTION:  EXTENSION/FLEXION:  normal (0-110 degrees)  IR (at 90 degrees of flexion):  20  ER (at 90 degrees of flexion):  40  PAIN WITH HIP MOTION:  no  PAIN WITH LOGROLL:  no     STINCHFIELD TEST: negative    STRENGTH:  ABDUCTOR:  4/5  ADDUCTOR:  5/5  HIP FLEXION:  5/5    GREATER TROCHANTER BURSAL PAIN:  no    SENSATION TO LIGHT TOUCH:  DEEP PERONEAL/SUPERFICIAL PERONEAL/SURAL/SAPHENOUS/TIBIAL:   intact    EDEMA:  no  ERYTHEMA:  no  WOUNDS/INCISIONS:  yes, Well-healed posterior lateral hip incision  _________________________________________________________________  _________________________________________________________________    RADIOGRAPHIC FINDINGS:   Indication: Status post revision right hip arthroplasty    Comparison: Todays xrays were compared to previous xrays from 5/1/18     AP pelvis: Right: Demonstrate a well positioned total hip without evidence of wear, loosening, fracture or osteolysis, femoral head is concentrically reduced within the acetabulum and No significant changes compared to prior radiographs.      Assessment/Plan:   Diagnosis Plan   1. S/P revision of total hip  XR Hip With or Without Pelvis 1 View Right    Ambulatory Referral to Physical Therapy Evaluate and treat   2. Status post  total replacement of left hip  Ambulatory Referral to Physical Therapy Evaluate and treat   3. Status post total hip replacement, right  Ambulatory Referral to Physical Therapy Evaluate and treat     Patient is doing well 3 year status post revision right hip arthroplasty.  He has no residual signs of infection or complications at this time.  We will refill his tramadol per his request as well as refer him to physical therapy for hip abductor and quad strengthening.  He'll follow-up in December for repeat evaluation of his left hip, this will be a 1 year anniversary from the left hip replacement.  He will need x-rays AP pelvis upon return.    Reed Lackey MD  05/29/18  8:33 AM

## 2018-08-06 RX ORDER — TRAMADOL HYDROCHLORIDE 50 MG/1
50 TABLET ORAL EVERY 6 HOURS PRN
Qty: 60 TABLET | Refills: 0 | OUTPATIENT
Start: 2018-08-06 | End: 2018-08-08 | Stop reason: SDUPTHER

## 2018-08-08 RX ORDER — TRAMADOL HYDROCHLORIDE 50 MG/1
TABLET ORAL
Qty: 60 TABLET | Refills: 0 | Status: SHIPPED | OUTPATIENT
Start: 2018-08-08 | End: 2019-02-12 | Stop reason: SDUPTHER

## 2018-10-16 RX ORDER — TRAMADOL HYDROCHLORIDE 50 MG/1
50 TABLET ORAL EVERY 6 HOURS PRN
Qty: 30 TABLET | Refills: 1 | Status: SHIPPED | OUTPATIENT
Start: 2018-10-16 | End: 2018-10-30 | Stop reason: SDUPTHER

## 2018-10-16 NOTE — TELEPHONE ENCOUNTER
The patient stated that he would like the prescription to be called in if possible. I did let him know that we did get the prescription refilled.

## 2018-10-30 ENCOUNTER — OFFICE VISIT (OUTPATIENT)
Dept: ORTHOPEDIC SURGERY | Facility: CLINIC | Age: 68
End: 2018-10-30

## 2018-10-30 VITALS — OXYGEN SATURATION: 98 % | BODY MASS INDEX: 22.9 KG/M2 | HEART RATE: 60 BPM | WEIGHT: 160 LBS | HEIGHT: 70 IN

## 2018-10-30 DIAGNOSIS — Z96.641 HISTORY OF TOTAL RIGHT HIP REPLACEMENT: Primary | ICD-10-CM

## 2018-10-30 PROCEDURE — 99213 OFFICE O/P EST LOW 20 MIN: CPT | Performed by: ORTHOPAEDIC SURGERY

## 2018-10-30 RX ORDER — TRAMADOL HYDROCHLORIDE 50 MG/1
50 TABLET ORAL EVERY 6 HOURS PRN
Qty: 60 TABLET | Refills: 1 | Status: SHIPPED | OUTPATIENT
Start: 2018-10-30 | End: 2018-10-30 | Stop reason: SDUPTHER

## 2018-10-30 RX ORDER — TRAMADOL HYDROCHLORIDE 50 MG/1
50 TABLET ORAL EVERY 6 HOURS PRN
Qty: 60 TABLET | Refills: 1 | Status: SHIPPED | OUTPATIENT
Start: 2018-10-30 | End: 2019-02-12 | Stop reason: SDUPTHER

## 2018-10-30 NOTE — PROGRESS NOTES
Orthopaedic Clinic Note: Hip Established Patient    Chief Complaint   Patient presents with   • Follow-up     5 months - S/P revision of total hip  2014    Follow-up bilateral total hip arthroplasties    HPI    It has been 5  month(s) since Mr. Paz's last visit. He returns to clinic today for follow-up of bilateral total hip arthroplasties with revision of right hip arthroplasty for infection performed at [facility].  He rates his pain a 2/10 on the pain scale in regards to the right hip.  He denies any pain in the left hip.  He is ambulate with no assistive device.  He takes occasional tramadol for pain.  Overall he is happy with his outcome.  He denies any complications.    Past Medical History:   Diagnosis Date   • Arthritis    • Kidney stone       Past Surgical History:   Procedure Laterality Date   • HIP ARTHROPLASTY     • HIP SURGERY Right     [facility]   • TOTAL HIP ARTHROPLASTY Left 12/11/2017    Procedure: TOTAL HIP ARTHROPLASTY LEFT;  Surgeon: Reed Lackey MD;  Location: [facility];  Service:       Family History   Problem Relation Age of Onset   • Cancer Mother    • Cancer Father      Social History     Social History   • Marital status:      Spouse name: N/A   • Number of children: N/A   • Years of education: N/A     Occupational History   • Not on file.     Social History Main Topics   • Smoking status: Former Smoker     Packs/day: 1.00     Years: 10.00     Types: Cigarettes   • Smokeless tobacco: Never Used   • Alcohol use Yes      Comment: occasional   • Drug use: No   • Sexual activity: Defer     Other Topics Concern   • Not on file     Social History Narrative   • No narrative on file      Current Outpatient Prescriptions on File Prior to Visit   Medication Sig Dispense Refill   • aspirin  MG EC tablet Take 1 tablet by mouth Every 12 (Twelve) Hours. For 1 month 60 tablet 0   • cephalexin (KEFLEX) 500 MG capsule Take 500 mg by mouth 3 (Three) Times a Day.  1   •  "docusate sodium 100 MG capsule Take 100 mg by mouth 2 (Two) Times a Day. 60 capsule 0   • meloxicam (MOBIC) 15 MG tablet TAKE ONE TABLET BY MOUTH EVERY DAY 60 tablet 0   • traMADol (ULTRAM) 50 MG tablet TAKE ONE TABLET BY MOUTH EVERY 6 HOURS AS NEEDED FOR MODERATE PAIN 60 tablet 0   • [DISCONTINUED] traMADol (ULTRAM) 50 MG tablet Take 1 tablet by mouth Every 6 (Six) Hours As Needed for Moderate Pain . 30 tablet 1     No current facility-administered medications on file prior to visit.       No Known Allergies     Review of Systems   Constitutional: Negative.    HENT: Negative.    Eyes: Negative.    Respiratory: Negative.    Cardiovascular: Negative.    Gastrointestinal: Negative.    Endocrine: Negative.    Genitourinary: Negative.    Musculoskeletal: Positive for arthralgias.   Skin: Negative.    Allergic/Immunologic: Negative.    Neurological: Negative.    Hematological: Negative.    Psychiatric/Behavioral: Negative.         Physical Exam  Pulse 60, height 177.8 cm (70\"), weight 72.6 kg (160 lb), SpO2 98 %.    Body mass index is 22.96 kg/m².    GENERAL APPEARANCE: awake, alert, oriented, in no acute distress  LUNGS:  breathing nonlabored  EXTREMITIES: no clubbing, cyanosis  PERIPHERAL PULSES: palpable dorsalis pedis and posterior tibial pulses bilaterally.    GAIT:  Trendelenberg            Hip Exam:  Bilateral    RANGE OF MOTION:  EXTENSION/FLEXION:  normal (0-110 degrees)  IR (at 90 degrees of flexion):  20  ER (at 90 degrees of flexion):  40  PAIN WITH HIP MOTION:  no  PAIN WITH LOGROLL:  no     STINCHFIELD TEST: negative    STRENGTH:  ABDUCTOR:  5/5 on left, 4+/5 on right   ADDUCTOR:  5/5  HIP FLEXION:  5/5    GREATER TROCHANTER BURSAL PAIN:  no    SENSATION TO LIGHT TOUCH:  DEEP PERONEAL/SUPERFICIAL PERONEAL/SURAL/SAPHENOUS/TIBIAL:   intact    EDEMA:  no  ERYTHEMA:  no  WOUNDS/INCISIONS:  yes, Well-healed surgical " incisions  _________________________________________________________________  _________________________________________________________________    RADIOGRAPHIC FINDINGS:   Indication: Status post bilateral total hip arthroplasties    Comparison: Todays xrays were compared to previous xrays from 5/29/18     AP pelvis: Right: Demonstrate a well positioned total revision hip without evidence of wear, loosening, fracture or osteolysis, femoral head is concentrically reduced within the acetabulum and No significant changes compared to prior radiographs.;Left: Demonstrate a well positioned total hip without evidence of wear, loosening, fracture or osteolysis, femoral head is concentrically reduced within the acetabulum and No significant changes compared to prior radiographs.      Assessment/Plan:   Diagnosis Plan   1. History of total right hip replacement  XR Pelvis 1 or 2 View    traMADol (ULTRAM) 50 MG tablet     Patient is doing well status post bilateral total hip arthroplasties.  He has occasional discomfort with the right hip arthroplasty which is chronic.  He takes occasional cramping tramadol for this.  I explained to him given the extensive bone loss, he is doing extremely well.  I recommended continued activity as tolerated.  I'll prescribe him tramadol for his chronic right hip pain.  He'll follow-up in 3 months for his year anniversary of the left hip.    Reed Lackey MD  10/30/18  8:29 AM

## 2019-02-12 ENCOUNTER — OFFICE VISIT (OUTPATIENT)
Dept: ORTHOPEDIC SURGERY | Facility: CLINIC | Age: 69
End: 2019-02-12

## 2019-02-12 VITALS — HEIGHT: 70 IN | BODY MASS INDEX: 22.66 KG/M2 | OXYGEN SATURATION: 99 % | HEART RATE: 78 BPM | WEIGHT: 158.29 LBS

## 2019-02-12 DIAGNOSIS — Z96.649 S/P REVISION OF TOTAL HIP: ICD-10-CM

## 2019-02-12 DIAGNOSIS — Z96.641 HISTORY OF TOTAL RIGHT HIP REPLACEMENT: Primary | ICD-10-CM

## 2019-02-12 PROCEDURE — 99213 OFFICE O/P EST LOW 20 MIN: CPT | Performed by: ORTHOPAEDIC SURGERY

## 2019-02-12 RX ORDER — TRAMADOL HYDROCHLORIDE 50 MG/1
50 TABLET ORAL EVERY 6 HOURS PRN
Qty: 60 TABLET | Refills: 1 | Status: SHIPPED | OUTPATIENT
Start: 2019-02-12 | End: 2019-06-12 | Stop reason: SDUPTHER

## 2019-02-12 ASSESSMENT — HOOS JR
HOOS JR SCORE: 2
HOOS JR SCORE: 85.257

## 2019-02-12 NOTE — PROGRESS NOTES
Orthopaedic Clinic Note: Hip Established Patient    Chief Complaint   Patient presents with   • Left Hip - Follow-up     3 month f/u  History of total left hip replacement      • Right Hip - Follow-up     3 month f/u  History of SALVADOR        HPI    It has been 3  month(s) since Mr. Paz's last visit. He returns to clinic today for follow-up bilateral total hip arthroplasties.  He is a year out from left total hip arthroplasty and denies any pain.  His right hip has occasional discomfort around the lateral trochanter area.  He rates his pain 1/10 on the pain scale.  He is ambulating with no assistive device.  Overall he is doing well.  He denies fevers chills or constitutional symptoms.  He is still taking tramadol as needed.    Past Medical History:   Diagnosis Date   • Arthritis    • Kidney stone       Past Surgical History:   Procedure Laterality Date   • HIP ARTHROPLASTY     • HIP SURGERY Right     [facility]   • TOTAL HIP ARTHROPLASTY Left 12/11/2017    Procedure: TOTAL HIP ARTHROPLASTY LEFT;  Surgeon: Reed Lackey MD;  Location: [facility];  Service:       Family History   Problem Relation Age of Onset   • Cancer Mother    • Cancer Father      Social History     Socioeconomic History   • Marital status:      Spouse name: Not on file   • Number of children: Not on file   • Years of education: Not on file   • Highest education level: Not on file   Social Needs   • Financial resource strain: Not on file   • Food insecurity - worry: Not on file   • Food insecurity - inability: Not on file   • Transportation needs - medical: Not on file   • Transportation needs - non-medical: Not on file   Occupational History   • Not on file   Tobacco Use   • Smoking status: Former Smoker     Packs/day: 1.00     Years: 10.00     Pack years: 10.00     Types: Cigarettes   • Smokeless tobacco: Never Used   Substance and Sexual Activity   • Alcohol use: Yes     Comment: occasional   • Drug use: No   • Sexual activity:  "Defer   Other Topics Concern   • Not on file   Social History Narrative   • Not on file      Current Outpatient Medications on File Prior to Visit   Medication Sig Dispense Refill   • aspirin  MG EC tablet Take 1 tablet by mouth Every 12 (Twelve) Hours. For 1 month 60 tablet 0   • cephalexin (KEFLEX) 500 MG capsule Take 500 mg by mouth 3 (Three) Times a Day.  1   • docusate sodium 100 MG capsule Take 100 mg by mouth 2 (Two) Times a Day. 60 capsule 0   • meloxicam (MOBIC) 15 MG tablet TAKE ONE TABLET BY MOUTH EVERY DAY 60 tablet 0   • [DISCONTINUED] traMADol (ULTRAM) 50 MG tablet TAKE ONE TABLET BY MOUTH EVERY 6 HOURS AS NEEDED FOR MODERATE PAIN 60 tablet 0   • [DISCONTINUED] traMADol (ULTRAM) 50 MG tablet Take 1 tablet by mouth Every 6 (Six) Hours As Needed for Moderate Pain . 60 tablet 1     No current facility-administered medications on file prior to visit.       No Known Allergies     Review of Systems   Constitutional: Negative.    HENT: Negative.    Eyes: Negative.    Respiratory: Negative.    Cardiovascular: Negative.    Gastrointestinal: Negative.    Endocrine: Negative.    Genitourinary: Negative.    Musculoskeletal: Positive for arthralgias.   Skin: Negative.    Allergic/Immunologic: Negative.    Neurological: Negative.    Hematological: Negative.    Psychiatric/Behavioral: Negative.         Physical Exam  Pulse 78, height 177.8 cm (70\"), weight 71.8 kg (158 lb 4.6 oz), SpO2 99 %.    Body mass index is 22.71 kg/m².    GENERAL APPEARANCE: awake, alert, oriented, in no acute distress and well developed, well nourished  LUNGS:  breathing nonlabored  EXTREMITIES: no clubbing, cyanosis  PERIPHERAL PULSES: palpable dorsalis pedis and posterior tibial pulses bilaterally.    GAIT:  Normal            Hip Exam:  Bilateral     RANGE OF MOTION:  EXTENSION/FLEXION:  normal (0-110 degrees)  IR (at 90 degrees of flexion):  20  ER (at 90 degrees of flexion):  40  PAIN WITH HIP MOTION:  no  PAIN WITH LOGROLL:  no "      STINCHFIELD TEST: negative     STRENGTH:  ABDUCTOR:    5/5 on left, 4+/5 on right   ADDUCTOR:  5/5  HIP FLEXION:  5/5     GREATER TROCHANTER BURSAL PAIN:  no    SENSATION TO LIGHT TOUCH:  DEEP PERONEAL/SUPERFICIAL PERONEAL/SURAL/SAPHENOUS/TIBIAL:   intact    EDEMA:  no  ERYTHEMA:  no  WOUNDS/INCISIONS:  yes, Well-healed surgical incisions  _________________________________________________________________  _________________________________________________________________    RADIOGRAPHIC FINDINGS:   Indication: Status post bilateral total hip arthroplasty    Comparison: Todays xrays were compared to previous xrays from 10/30/18     AP pelvis: Right: Demonstrate a well positioned total hip without evidence of wear, loosening, fracture or osteolysis, femoral head is concentrically reduced within the acetabulum and No significant changes compared to prior radiographs.;Left: Demonstrate a well positioned total hip without evidence of wear, loosening, fracture or osteolysis, femoral head is concentrically reduced within the acetabulum and No significant changes compared to prior radiographs.      Assessment/Plan:   Diagnosis Plan   1. History of total right hip replacement  XR Pelvis 1 or 2 View    traMADol (ULTRAM) 50 MG tablet   2. S/P revision of total hip  traMADol (ULTRAM) 50 MG tablet     Patient is doing well status post bilateral total hip arthroplasties.  I refilled his tramadol be provided.  I explained to him that his constructs remain in excellent position without any evidence of complication.  He'll follow-up in 6 months for repeat assessment with x-ray AP pelvis    Reed Lackey MD  02/12/19  8:04 AM

## 2019-02-19 ENCOUNTER — TELEPHONE (OUTPATIENT)
Dept: ORTHOPEDIC SURGERY | Facility: CLINIC | Age: 69
End: 2019-02-19

## 2019-06-12 DIAGNOSIS — Z96.641 HISTORY OF TOTAL RIGHT HIP REPLACEMENT: ICD-10-CM

## 2019-06-12 DIAGNOSIS — Z96.649 S/P REVISION OF TOTAL HIP: ICD-10-CM

## 2019-06-12 RX ORDER — TRAMADOL HYDROCHLORIDE 50 MG/1
TABLET ORAL
Qty: 60 TABLET | Refills: 1 | Status: SHIPPED | OUTPATIENT
Start: 2019-06-12 | End: 2019-08-15 | Stop reason: SDUPTHER

## 2019-08-15 ENCOUNTER — OFFICE VISIT (OUTPATIENT)
Dept: ORTHOPEDIC SURGERY | Facility: CLINIC | Age: 69
End: 2019-08-15

## 2019-08-15 VITALS — BODY MASS INDEX: 21.78 KG/M2 | OXYGEN SATURATION: 97 % | WEIGHT: 152.12 LBS | HEIGHT: 70 IN | HEART RATE: 79 BPM

## 2019-08-15 DIAGNOSIS — Z96.641 HISTORY OF TOTAL RIGHT HIP REPLACEMENT: ICD-10-CM

## 2019-08-15 DIAGNOSIS — Z96.649 S/P REVISION OF TOTAL HIP: ICD-10-CM

## 2019-08-15 DIAGNOSIS — Z96.642 STATUS POST TOTAL REPLACEMENT OF LEFT HIP: Primary | ICD-10-CM

## 2019-08-15 PROCEDURE — 99213 OFFICE O/P EST LOW 20 MIN: CPT | Performed by: ORTHOPAEDIC SURGERY

## 2019-08-15 RX ORDER — TRAMADOL HYDROCHLORIDE 50 MG/1
50 TABLET ORAL EVERY 6 HOURS PRN
Qty: 60 TABLET | Refills: 0 | Status: SHIPPED | OUTPATIENT
Start: 2019-08-15 | End: 2020-02-04 | Stop reason: SDUPTHER

## 2019-08-15 NOTE — PROGRESS NOTES
Orthopaedic Clinic Note: Hip Established Patient    Chief Complaint   Patient presents with   • Right Hip - Follow-up     6 months- s/p SALVADOR 2014   • Left Hip - Follow-up     6 month f/u- s/p (L) SALVADOR 12/11/2017        HPI    It has been 6  month(s) since Mr. Paz's last visit. He returns to clinic today for follow-up bilateral total hip arthroplasties.  Patient states he is doing well overall.  Rates his pain 2/10 on the pain scale on the right hip only.  He remains active and actually ran a short race a few weeks ago and little.  Overall he is doing well with no significant limitations in daily activities.  He is taking tramadol as needed.  In regards to the left hip he is doing extremely well.    Past Medical History:   Diagnosis Date   • Arthritis    • Kidney stone       Past Surgical History:   Procedure Laterality Date   • HIP ARTHROPLASTY     • HIP SURGERY Right     [facility]   • TOTAL HIP ARTHROPLASTY Left 12/11/2017    Procedure: TOTAL HIP ARTHROPLASTY LEFT;  Surgeon: Reed Lackey MD;  Location: [facility];  Service:       Family History   Problem Relation Age of Onset   • Cancer Mother    • Cancer Father      Social History     Socioeconomic History   • Marital status:      Spouse name: Not on file   • Number of children: Not on file   • Years of education: Not on file   • Highest education level: Not on file   Tobacco Use   • Smoking status: Former Smoker     Packs/day: 1.00     Years: 10.00     Pack years: 10.00     Types: Cigarettes   • Smokeless tobacco: Never Used   Substance and Sexual Activity   • Alcohol use: Yes     Comment: occasional   • Drug use: No   • Sexual activity: Defer      Current Outpatient Medications on File Prior to Visit   Medication Sig Dispense Refill   • aspirin  MG EC tablet Take 1 tablet by mouth Every 12 (Twelve) Hours. For 1 month 60 tablet 0   • cephalexin (KEFLEX) 500 MG capsule Take 500 mg by mouth 3 (Three) Times a Day.  1   • docusate sodium 100  "MG capsule Take 100 mg by mouth 2 (Two) Times a Day. 60 capsule 0   • meloxicam (MOBIC) 15 MG tablet TAKE ONE TABLET BY MOUTH EVERY DAY 60 tablet 0   • [DISCONTINUED] traMADol (ULTRAM) 50 MG tablet TAKE ONE TABLET BY MOUTH EVERY 6 HOURS AS NEEDED FOR MODERATE PAIN 60 tablet 1     No current facility-administered medications on file prior to visit.       No Known Allergies     Review of Systems   Constitutional: Negative.    HENT: Negative.    Eyes: Negative.    Respiratory: Negative.    Cardiovascular: Negative.    Gastrointestinal: Negative.    Endocrine: Negative.    Genitourinary: Negative.    Musculoskeletal: Positive for arthralgias.   Skin: Negative.    Allergic/Immunologic: Negative.    Neurological: Negative.    Hematological: Negative.    Psychiatric/Behavioral: Negative.         The patient's Review of Systems was personally reviewed and confirmed as accurate.    Physical Exam  Pulse 79, height 177.8 cm (70\"), weight 69 kg (152 lb 1.9 oz), SpO2 97 %.    Body mass index is 21.83 kg/m².    GENERAL APPEARANCE: awake, alert, oriented, in no acute distress and well developed, well nourished  LUNGS:  breathing nonlabored  EXTREMITIES: no clubbing, cyanosis  PERIPHERAL PULSES: palpable dorsalis pedis and posterior tibial pulses bilaterally.    GAIT:  Normal            Hip Exam:  Bilateral     RANGE OF MOTION:  EXTENSION/FLEXION:  normal (0-110 degrees)  IR (at 90 degrees of flexion):  20  ER (at 90 degrees of flexion):  40  PAIN WITH HIP MOTION:  no  PAIN WITH LOGROLL:  no      STINCHFIELD TEST: negative     STRENGTH:  ABDUCTOR:    5/5 on left, 4+/5 on right   ADDUCTOR:  5/5  HIP FLEXION:  5/5     GREATER TROCHANTER BURSAL PAIN: Trace bursal tenderness about the level of right hip, no tenderness at the left hip    SENSATION TO LIGHT TOUCH:  DEEP PERONEAL/SUPERFICIAL PERONEAL/SURAL/SAPHENOUS/TIBIAL:   intact    EDEMA:  no  ERYTHEMA:  no  WOUNDS/INCISIONS:  yes, Well-healed surgical " incisions    _________________________________________________________________  _________________________________________________________________    RADIOGRAPHIC FINDINGS:   Indication: Status post bilateral total hip arthroplasty    Comparison: Todays xrays were compared to previous xrays from 2/12/2019    AP pelvis: Right: Demonstrate a well positioned revision total hip without evidence of wear, loosening, fracture or osteolysis, femoral head is concentrically reduced within the acetabulum and No significant changes compared to prior radiographs.;Left: Demonstrate a well positioned total hip without evidence of wear, loosening, fracture or osteolysis, femoral head is concentrically reduced within the acetabulum and No significant changes compared to prior radiographs.      Assessment/Plan:   Diagnosis Plan   1. Status post total replacement of left hip  XR Pelvis 1 or 2 View   2. History of total right hip replacement  XR Pelvis 1 or 2 View    traMADol (ULTRAM) 50 MG tablet   3. S/P revision of total hip  traMADol (ULTRAM) 50 MG tablet     Patient is doing well status post bilateral total hip arthroplasties.  We will refill the tramadol for the right hip as he does have complication from the loss of proximal bone with ongoing soft tissue irritation and occasional pain.  Overall he is stable in regards to radiographic and clinical findings.  I will see him back again in 6 months with repeat x-rays of the pelvis.    Reed Lackey MD  08/15/19  11:57 AM

## 2020-02-04 ENCOUNTER — OFFICE VISIT (OUTPATIENT)
Dept: ORTHOPEDIC SURGERY | Facility: CLINIC | Age: 70
End: 2020-02-04

## 2020-02-04 VITALS — WEIGHT: 156.31 LBS | BODY MASS INDEX: 22.38 KG/M2 | OXYGEN SATURATION: 98 % | HEART RATE: 68 BPM | HEIGHT: 70 IN

## 2020-02-04 DIAGNOSIS — Z96.649 S/P REVISION OF TOTAL HIP: Primary | ICD-10-CM

## 2020-02-04 DIAGNOSIS — Z96.642 STATUS POST TOTAL REPLACEMENT OF LEFT HIP: ICD-10-CM

## 2020-02-04 DIAGNOSIS — R29.898 WEAKNESS OF BOTH HIPS: ICD-10-CM

## 2020-02-04 DIAGNOSIS — Z96.641 HISTORY OF TOTAL RIGHT HIP REPLACEMENT: ICD-10-CM

## 2020-02-04 PROCEDURE — 99213 OFFICE O/P EST LOW 20 MIN: CPT | Performed by: ORTHOPAEDIC SURGERY

## 2020-02-04 RX ORDER — TRAMADOL HYDROCHLORIDE 50 MG/1
50 TABLET ORAL EVERY 6 HOURS PRN
Qty: 60 TABLET | Refills: 0 | Status: SHIPPED | OUTPATIENT
Start: 2020-02-04 | End: 2020-05-21

## 2020-02-04 NOTE — PROGRESS NOTES
"Orthopaedic Clinic Note: Hip Established Patient    Chief Complaint   Patient presents with   • Follow-up     6 month recheck -  Status post   Right SALVADOR 2014    &    Left SALVADOR 12/11/2017        HPI    It has been 6  month(s) since Mr. Paz's last visit. He returns to clinic today for follow-up bilateral total hip arthroplasty.  He rates his pain 2/10 on the pain scale.  He is ambulating with no assistive device.  Denies fevers chills or constitutional symptoms.  Overall his symptoms remain unchanged compared to his last visit.  He does notice that after prolonged periods of ambulation however, he does \"limp a little more than he used to\".  He is continuing a home exercise program and walking daily.    Past Medical History:   Diagnosis Date   • Arthritis    • Kidney stone       Past Surgical History:   Procedure Laterality Date   • HIP ARTHROPLASTY     • HIP SURGERY Right     [facility]   • TOTAL HIP ARTHROPLASTY Left 12/11/2017    Procedure: TOTAL HIP ARTHROPLASTY LEFT;  Surgeon: Reed Lackey MD;  [facility]  Service:       Family History   Problem Relation Age of Onset   • Cancer Mother    • Cancer Father      Social History     Socioeconomic History   • Marital status:      Spouse name: Not on file   • Number of children: Not on file   • Years of education: Not on file   • Highest education level: Not on file   Tobacco Use   • Smoking status: Former Smoker     Packs/day: 1.00     Years: 10.00     Pack years: 10.00     Types: Cigarettes   • Smokeless tobacco: Never Used   Substance and Sexual Activity   • Alcohol use: Yes     Comment: occasional   • Drug use: No   • Sexual activity: Defer      Current Outpatient Medications on File Prior to Visit   Medication Sig Dispense Refill   • aspirin  MG EC tablet Take 1 tablet by mouth Every 12 (Twelve) Hours. For 1 month 60 tablet 0   • cephalexin (KEFLEX) 500 MG capsule Take 500 mg by mouth 3 (Three) Times a Day.  1   • docusate sodium " "100 MG capsule Take 100 mg by mouth 2 (Two) Times a Day. 60 capsule 0   • meloxicam (MOBIC) 15 MG tablet TAKE ONE TABLET BY MOUTH EVERY DAY 60 tablet 0   • [DISCONTINUED] traMADol (ULTRAM) 50 MG tablet Take 1 tablet by mouth Every 6 (Six) Hours As Needed for Moderate Pain . 60 tablet 0     No current facility-administered medications on file prior to visit.       No Known Allergies     Review of Systems   Constitutional: Positive for activity change.   HENT: Negative.    Eyes: Negative.    Respiratory: Negative.    Cardiovascular: Negative.    Gastrointestinal: Negative.    Endocrine: Negative.    Genitourinary: Negative.    Musculoskeletal: Positive for arthralgias.   Skin: Negative.    Allergic/Immunologic: Negative.    Neurological: Negative.    Hematological: Negative.    Psychiatric/Behavioral: Negative.         The patient's Review of Systems was personally reviewed and confirmed as accurate.    Physical Exam  Pulse 68, height 177.8 cm (70\"), weight 70.9 kg (156 lb 4.9 oz), SpO2 98 %.    Body mass index is 22.43 kg/m².    GENERAL APPEARANCE: awake, alert, oriented, in no acute distress and well developed, well nourished  LUNGS:  breathing nonlabored  EXTREMITIES: no clubbing, cyanosis  PERIPHERAL PULSES: palpable dorsalis pedis and posterior tibial pulses bilaterally.    GAIT:  Trendelenberg            Hip Exam:  Bilateral     RANGE OF MOTION:  EXTENSION/FLEXION:  normal (0-110 degrees)  IR (at 90 degrees of flexion):  20  ER (at 90 degrees of flexion):  40  PAIN WITH HIP MOTION:  no  PAIN WITH LOGROLL:  no      STINCHFIELD TEST: negative     STRENGTH:  ABDUCTOR:    4+/5 on left, 4+/5 on right   ADDUCTOR:  5/5  HIP FLEXION:  5/5     GREATER TROCHANTER BURSAL PAIN: Trace bursal tenderness about the level greater trochanter of right hip, no tenderness at the left hip    SENSATION TO LIGHT TOUCH:  DEEP PERONEAL/SUPERFICIAL " PERONEAL/SURAL/SAPHENOUS/TIBIAL:   intact    EDEMA:  no  ERYTHEMA:  no  WOUNDS/INCISIONS:  yes, Well-healed surgical incisions   _________________________________________________________________  _________________________________________________________________    RADIOGRAPHIC FINDINGS:   Indication: Status post bilateral total hip arthroplasty    Comparison: Todays xrays were compared to previous xrays from 8/15/2019    AP pelvis: Right: Demonstrate a well positioned total hip without evidence of wear, loosening, fracture or osteolysis, femoral head is concentrically reduced within the acetabulum and No significant changes compared to prior radiographs.;Left: Demonstrate a well positioned total hip without evidence of wear, loosening, fracture or osteolysis, femoral head is concentrically reduced within the acetabulum and No significant changes compared to prior radiographs.      Assessment/Plan:   Diagnosis Plan   1. S/P revision of total hip  XR Pelvis 1 or 2 View    traMADol (ULTRAM) 50 MG tablet   2. Status post total replacement of left hip     3. Weakness of both hips  Ambulatory Referral to Physical Therapy Evaluate and treat   4. History of total right hip replacement  traMADol (ULTRAM) 50 MG tablet     Patient doing well status post bilateral total hip arthroplasty.  He does have some residual weakness of both hip abductor muscles and therefore I recommended referral to physical therapy for strengthening.  We will refill his tramadol prescription.  He will follow-up in 6 months for repeat assessment x-ray AP pelvis.    Reed Lackey MD  02/04/20  7:59 AM

## 2020-05-21 DIAGNOSIS — Z96.649 S/P REVISION OF TOTAL HIP: ICD-10-CM

## 2020-05-21 DIAGNOSIS — Z96.641 HISTORY OF TOTAL RIGHT HIP REPLACEMENT: ICD-10-CM

## 2020-05-21 RX ORDER — TRAMADOL HYDROCHLORIDE 50 MG/1
TABLET ORAL
Qty: 60 TABLET | Refills: 0 | Status: SHIPPED | OUTPATIENT
Start: 2020-05-21 | End: 2020-06-18 | Stop reason: SDUPTHER

## 2020-06-18 ENCOUNTER — OFFICE VISIT (OUTPATIENT)
Dept: ORTHOPEDIC SURGERY | Facility: CLINIC | Age: 70
End: 2020-06-18

## 2020-06-18 VITALS — OXYGEN SATURATION: 99 % | HEIGHT: 70 IN | BODY MASS INDEX: 22.9 KG/M2 | WEIGHT: 160 LBS | HEART RATE: 64 BPM

## 2020-06-18 DIAGNOSIS — Z96.641 HISTORY OF TOTAL RIGHT HIP REPLACEMENT: ICD-10-CM

## 2020-06-18 DIAGNOSIS — Z96.641 H/O TOTAL HIP ARTHROPLASTY, RIGHT: ICD-10-CM

## 2020-06-18 DIAGNOSIS — Z96.649 S/P REVISION OF TOTAL HIP: ICD-10-CM

## 2020-06-18 DIAGNOSIS — S76.219A STRAIN OF ADDUCTOR MUSCLE OF THIGH: Primary | ICD-10-CM

## 2020-06-18 PROCEDURE — 99213 OFFICE O/P EST LOW 20 MIN: CPT | Performed by: ORTHOPAEDIC SURGERY

## 2020-06-18 RX ORDER — TRAMADOL HYDROCHLORIDE 50 MG/1
50 TABLET ORAL EVERY 6 HOURS PRN
Qty: 60 TABLET | Refills: 0 | Status: SHIPPED | OUTPATIENT
Start: 2020-06-18 | End: 2020-09-14

## 2020-06-18 RX ORDER — METHOCARBAMOL 750 MG/1
750 TABLET, FILM COATED ORAL 4 TIMES DAILY PRN
Qty: 90 TABLET | Refills: 0 | Status: SHIPPED | OUTPATIENT
Start: 2020-06-18 | End: 2020-07-16

## 2020-06-18 NOTE — PROGRESS NOTES
Orthopaedic Clinic Note: Hip Established Patient    Chief Complaint   Patient presents with   • Follow-up     4 month f/u--Status post   Right SALVADOR 2014    &    Left SALVADOR 12/11/2017        HPI    It has been 4  month(s) since Mr. Paz's last visit. He returns to clinic today for new complaint of right medial thigh pain.  Patient states that over the past 2 to 3 months, he has developed some progressive pain along the medial thigh musculature.  He rates the pain a 2/10 on the pain scale.  He states it is worse with running and lifting heavy objects.  Ambulating does not elicit the pain.  He denies fevers chills or constitutional symptoms.  He is here today to discuss his ongoing medial thigh pain.  He denies any focal pain to the groin.    Past Medical History:   Diagnosis Date   • Arthritis    • Kidney stone       Past Surgical History:   Procedure Laterality Date   • HIP ARTHROPLASTY     • HIP SURGERY Right     [facility]   • TOTAL HIP ARTHROPLASTY Left 12/11/2017    Procedure: TOTAL HIP ARTHROPLASTY LEFT;  Surgeon: Reed Lackey MD;  Location: [facility];  Service:       Family History   Problem Relation Age of Onset   • Cancer Mother    • Cancer Father      Social History     Socioeconomic History   • Marital status:      Spouse name: Not on file   • Number of children: Not on file   • Years of education: Not on file   • Highest education level: Not on file   Tobacco Use   • Smoking status: Former Smoker     Packs/day: 1.00     Years: 10.00     Pack years: 10.00     Types: Cigarettes   • Smokeless tobacco: Never Used   Substance and Sexual Activity   • Alcohol use: Yes     Comment: occasional   • Drug use: No   • Sexual activity: Defer      Current Outpatient Medications on File Prior to Visit   Medication Sig Dispense Refill   • aspirin  MG EC tablet Take 1 tablet by mouth Every 12 (Twelve) Hours. For 1 month 60 tablet 0   • cephalexin (KEFLEX) 500 MG capsule Take 500 mg by mouth 3 (Three)  "Times a Day.  1   • docusate sodium 100 MG capsule Take 100 mg by mouth 2 (Two) Times a Day. 60 capsule 0   • meloxicam (MOBIC) 15 MG tablet TAKE ONE TABLET BY MOUTH EVERY DAY 60 tablet 0   • [DISCONTINUED] traMADol (ULTRAM) 50 MG tablet TAKE ONE TABLET BY MOUTH EVERY 6 HOURS AS NEEDED FOR MODERATE PAIN 60 tablet 0     No current facility-administered medications on file prior to visit.       No Known Allergies     Review of Systems   Constitutional: Negative.    HENT: Negative.    Eyes: Negative.    Respiratory: Negative.    Cardiovascular: Negative.    Gastrointestinal: Negative.    Endocrine: Negative.    Genitourinary: Negative.    Musculoskeletal: Positive for arthralgias.   Skin: Negative.    Allergic/Immunologic: Negative.    Neurological: Negative.    Hematological: Negative.    Psychiatric/Behavioral: Negative.         The patient's Review of Systems was personally reviewed and confirmed as accurate.    Physical Exam  Pulse 64, height 177.8 cm (70\"), weight 72.6 kg (160 lb), SpO2 99 %.    Body mass index is 22.96 kg/m².    GENERAL APPEARANCE: awake, alert, oriented, in no acute distress and well developed, well nourished  LUNGS:  breathing nonlabored  EXTREMITIES: no clubbing, cyanosis  PERIPHERAL PULSES: palpable dorsalis pedis and posterior tibial pulses bilaterally.    GAIT:  Normal            Hip Exam:  Bilateral     RANGE OF MOTION:  EXTENSION/FLEXION:  normal (0-110 degrees)  IR (at 90 degrees of flexion):  20  ER (at 90 degrees of flexion):  40  PAIN WITH HIP MOTION:  no  PAIN WITH LOGROLL:  no      STINCHFIELD TEST: negative     STRENGTH:  ABDUCTOR:    5/5 on left, 4+/5 on right   ADDUCTOR:  5/5  HIP FLEXION:  5/5     GREATER TROCHANTER BURSAL PAIN: No  Focally tender about the adductor musculature of the right hip    SENSATION TO LIGHT TOUCH:  DEEP PERONEAL/SUPERFICIAL PERONEAL/SURAL/SAPHENOUS/TIBIAL:   intact    EDEMA:  no  ERYTHEMA:  no  WOUNDS/INCISIONS:  yes, Well-healed surgical " incisions   _________________________________________________________________________________________  _________________________________________________________________    RADIOGRAPHIC FINDINGS:   Indication: Right thigh pain status post bilateral total hip arthroplasty    Comparison: Todays xrays were compared to previous xrays from 2/4/2020    AP pelvis: Right: Demonstrate a well positioned total hip without evidence of wear, loosening, fracture or osteolysis, femoral head is concentrically reduced within the acetabulum and No significant changes compared to prior radiographs.;Left: Demonstrate a well positioned total hip without evidence of wear, loosening, fracture or osteolysis, femoral head is concentrically reduced within the acetabulum and No significant changes compared to prior radiographs.      Assessment/Plan:   Diagnosis Plan   1. Strain of adductor muscle of thigh  methocarbamol (ROBAXIN) 750 MG tablet   2. H/O total hip arthroplasty, right  XR Pelvis 1 or 2 View   3. History of total right hip replacement  traMADol (ULTRAM) 50 MG tablet   4. S/P revision of total hip  traMADol (ULTRAM) 50 MG tablet     I discussed with patient that his pain appears to be due to a muscle strain.  I discussed treatment options.  He is agreeable to a muscle relaxer.  This will be provided.  We will also refill his tramadol.  He will follow-up as previously scheduled.    Reed Lackey MD  06/18/20  09:03

## 2020-07-16 DIAGNOSIS — S76.219A STRAIN OF ADDUCTOR MUSCLE OF THIGH: ICD-10-CM

## 2020-07-16 RX ORDER — METHOCARBAMOL 750 MG/1
TABLET, FILM COATED ORAL
Qty: 90 TABLET | Refills: 0 | Status: SHIPPED | OUTPATIENT
Start: 2020-07-16 | End: 2020-08-13

## 2020-08-13 DIAGNOSIS — S76.219A STRAIN OF ADDUCTOR MUSCLE OF THIGH: ICD-10-CM

## 2020-08-13 RX ORDER — METHOCARBAMOL 750 MG/1
TABLET, FILM COATED ORAL
Qty: 90 TABLET | Refills: 0 | Status: SHIPPED | OUTPATIENT
Start: 2020-08-13 | End: 2020-09-14

## 2020-08-20 ENCOUNTER — TELEPHONE (OUTPATIENT)
Dept: ORTHOPEDIC SURGERY | Facility: CLINIC | Age: 70
End: 2020-08-20

## 2020-08-20 NOTE — TELEPHONE ENCOUNTER
CALLED PATIENT REGARDING NO- SHOW APPOINTMENT. NO ANSWER LEFT VOICE MAIL TO CALL BACK TO RESCHEDULE.

## 2020-09-14 DIAGNOSIS — Z96.641 HISTORY OF TOTAL RIGHT HIP REPLACEMENT: ICD-10-CM

## 2020-09-14 DIAGNOSIS — S76.219A STRAIN OF ADDUCTOR MUSCLE OF THIGH: ICD-10-CM

## 2020-09-14 DIAGNOSIS — Z96.649 S/P REVISION OF TOTAL HIP: ICD-10-CM

## 2020-09-14 RX ORDER — TRAMADOL HYDROCHLORIDE 50 MG/1
TABLET ORAL
Qty: 60 TABLET | Refills: 0 | Status: SHIPPED | OUTPATIENT
Start: 2020-09-14 | End: 2020-10-01 | Stop reason: SDUPTHER

## 2020-09-14 RX ORDER — METHOCARBAMOL 750 MG/1
TABLET, FILM COATED ORAL
Qty: 90 TABLET | Refills: 0 | Status: SHIPPED | OUTPATIENT
Start: 2020-09-14 | End: 2020-10-01 | Stop reason: SDUPTHER

## 2020-09-14 NOTE — TELEPHONE ENCOUNTER
PATIENT IS REQUESTING A NEW PHYSICAL THERAPY ORDER FOR RIGHT GROIN AND RIGHT HIP. PATIENT WOULD LIKE TO GO TO [facility].

## 2020-10-01 ENCOUNTER — OFFICE VISIT (OUTPATIENT)
Dept: ORTHOPEDIC SURGERY | Facility: CLINIC | Age: 70
End: 2020-10-01

## 2020-10-01 VITALS — WEIGHT: 164.4 LBS | HEART RATE: 70 BPM | OXYGEN SATURATION: 98 % | BODY MASS INDEX: 23.54 KG/M2 | HEIGHT: 70 IN

## 2020-10-01 DIAGNOSIS — Z96.641 HISTORY OF TOTAL RIGHT HIP REPLACEMENT: ICD-10-CM

## 2020-10-01 DIAGNOSIS — S76.219A STRAIN OF ADDUCTOR MUSCLE OF THIGH: ICD-10-CM

## 2020-10-01 DIAGNOSIS — Z96.649 S/P REVISION OF TOTAL HIP: Primary | ICD-10-CM

## 2020-10-01 PROCEDURE — 99213 OFFICE O/P EST LOW 20 MIN: CPT | Performed by: ORTHOPAEDIC SURGERY

## 2020-10-01 RX ORDER — METHOCARBAMOL 750 MG/1
750 TABLET, FILM COATED ORAL 4 TIMES DAILY
Qty: 180 TABLET | Refills: 0 | Status: SHIPPED | OUTPATIENT
Start: 2020-10-01 | End: 2020-12-21

## 2020-10-01 RX ORDER — TRAMADOL HYDROCHLORIDE 50 MG/1
50 TABLET ORAL EVERY 6 HOURS PRN
Qty: 60 TABLET | Refills: 0 | Status: SHIPPED | OUTPATIENT
Start: 2020-10-01 | End: 2020-11-16

## 2020-10-01 NOTE — PROGRESS NOTES
Orthopaedic Clinic Note: Hip Established Patient    Chief Complaint   Patient presents with   • Follow-up      7 months recheck Status post   Right SALVADOR 2014    &    Left SALVADOR 12/11/2017        HPI    It has been 7  month(s) since Mr. Paz's last visit. He returns to clinic today for follow-up bilateral total hip arthroplasty, revision right hip arthroplasty.  He continues to do well in regards to the hip function.  He is having some adductor muscle pain involving the right groin area that has been persistent over the past several months.  He has been doing occasional therapy but has been inconsistent in regards to doing his therapy exercises as well as icing.  He denies consistent pain but states that with sharp, aggressive movements of the hips, he has pain isolated to the anterior pubis region.  He denies fevers chills or constitutional symptoms.  Overall he is doing about the same.     Past Medical History:   Diagnosis Date   • Arthritis    • Kidney stone       Past Surgical History:   Procedure Laterality Date   • HIP ARTHROPLASTY     • HIP SURGERY Right     [facility]   • TOTAL HIP ARTHROPLASTY Left 12/11/2017    Procedure: TOTAL HIP ARTHROPLASTY LEFT;  Surgeon: Reed Lackey MD;  [facility];  Service:       Family History   Problem Relation Age of Onset   • Cancer Mother    • Cancer Father      Social History     Socioeconomic History   • Marital status:      Spouse name: Not on file   • Number of children: Not on file   • Years of education: Not on file   • Highest education level: Not on file   Tobacco Use   • Smoking status: Former Smoker     Packs/day: 1.00     Years: 10.00     Pack years: 10.00     Types: Cigarettes   • Smokeless tobacco: Never Used   Substance and Sexual Activity   • Alcohol use: Yes     Comment: occasional   • Drug use: No   • Sexual activity: Defer      Current Outpatient Medications on File Prior to Visit   Medication Sig Dispense Refill   • cephalexin  "(KEFLEX) 500 MG capsule Take 500 mg by mouth 3 (Three) Times a Day.  1   • [DISCONTINUED] methocarbamol (ROBAXIN) 750 MG tablet TAKE 1 TABLET BY MOUTH FOUR TIMES DAILY IF NEEDED FOR MUSCLE SPASMS 90 tablet 0   • [DISCONTINUED] traMADol (ULTRAM) 50 MG tablet TAKE 1 TABLET BY MOUTH EVERY 6 HOURS IF NEEDED FOR MODERATE PAIN 60 tablet 0   • [DISCONTINUED] aspirin  MG EC tablet Take 1 tablet by mouth Every 12 (Twelve) Hours. For 1 month 60 tablet 0   • [DISCONTINUED] docusate sodium 100 MG capsule Take 100 mg by mouth 2 (Two) Times a Day. 60 capsule 0   • [DISCONTINUED] meloxicam (MOBIC) 15 MG tablet TAKE ONE TABLET BY MOUTH EVERY DAY 60 tablet 0     No current facility-administered medications on file prior to visit.       No Known Allergies     Review of Systems   Constitutional: Negative.    HENT: Negative.    Eyes: Negative.    Respiratory: Negative.    Cardiovascular: Negative.    Gastrointestinal: Negative.    Endocrine: Negative.    Genitourinary: Negative.    Musculoskeletal: Positive for arthralgias.   Skin: Negative.    Allergic/Immunologic: Negative.    Neurological: Negative.    Hematological: Negative.    Psychiatric/Behavioral: Negative.         The patient's Review of Systems was personally reviewed and confirmed as accurate.    Physical Exam  Pulse 70, height 177.8 cm (70\"), weight 74.6 kg (164 lb 6.4 oz), SpO2 98 %.    Body mass index is 23.59 kg/m².    GENERAL APPEARANCE: awake, alert, oriented, in no acute distress and well developed, well nourished  LUNGS:  breathing nonlabored  EXTREMITIES: no clubbing, cyanosis  PERIPHERAL PULSES: palpable dorsalis pedis and posterior tibial pulses bilaterally.    GAIT:  Normal            Hip Exam:  Bilateral    RANGE OF MOTION:  EXTENSION/FLEXION:  normal (0-110 degrees)  IR (at 90 degrees of flexion):  20  ER (at 90 degrees of flexion):  40  PAIN WITH HIP MOTION:  no  PAIN WITH LOGROLL:  no     STINCHFIELD TEST: negative    STRENGTH:  ABDUCTOR:  " 5/5  ADDUCTOR:  5/5  HIP FLEXION:  5/5    GREATER TROCHANTER BURSAL PAIN:  no    SENSATION TO LIGHT TOUCH:  DEEP PERONEAL/SUPERFICIAL PERONEAL/SURAL/SAPHENOUS/TIBIAL:   intact    EDEMA:  no  ERYTHEMA:  no  WOUNDS/INCISIONS:   yes, well healed surgical incision without evidence of erythema or drainage  _________________________________________________________________  _________________________________________________________________    RADIOGRAPHIC FINDINGS:   Indication: Status post total hip arthroplasty    Comparison: Todays xrays were compared to previous xrays from 6/18/2020    AP pelvis: Right: Demonstrate a well positioned revision total hip without evidence of wear, loosening, fracture or osteolysis, femoral head is concentrically reduced within the acetabulum and No significant changes compared to prior radiographs.;Left: Demonstrate a well positioned total hip without evidence of wear, loosening, fracture or osteolysis, femoral head is concentrically reduced within the acetabulum and No significant changes compared to prior radiographs.      Assessment/Plan:   Diagnosis Plan   1. S/P revision of total hip  XR Pelvis 1 or 2 View    traMADol (ULTRAM) 50 MG tablet   2. History of total right hip replacement  traMADol (ULTRAM) 50 MG tablet   3. Strain of adductor muscle of thigh  methocarbamol (ROBAXIN) 750 MG tablet     I discussed with the patient that his pain is either a strain of the abductor muscle or possible sports hernia.  His total hip arthroplasties are functioning well and appear to be in excellent condition based on radiographic criteria.  I will refill his tramadol as well as his muscle relaxer as he does believe the Robaxin is helping him with his sports hernia type pain.  I encouraged him to become more diligent in his formal physical therapy, stretching, exercise regimen to help with the groin type pain.  He will follow-up with me in 6 months for repeat assessment.  No x-rays needed on  return.    Reed Lackey MD  10/01/20  15:45 EDT

## 2020-11-16 DIAGNOSIS — Z96.649 S/P REVISION OF TOTAL HIP: ICD-10-CM

## 2020-11-16 DIAGNOSIS — Z96.641 HISTORY OF TOTAL RIGHT HIP REPLACEMENT: ICD-10-CM

## 2020-11-16 RX ORDER — TRAMADOL HYDROCHLORIDE 50 MG/1
TABLET ORAL
Qty: 60 TABLET | Refills: 0 | Status: SHIPPED | OUTPATIENT
Start: 2020-11-16 | End: 2020-12-21

## 2020-12-21 DIAGNOSIS — S76.219A STRAIN OF ADDUCTOR MUSCLE OF THIGH: ICD-10-CM

## 2020-12-21 DIAGNOSIS — Z96.641 HISTORY OF TOTAL RIGHT HIP REPLACEMENT: ICD-10-CM

## 2020-12-21 DIAGNOSIS — Z96.649 S/P REVISION OF TOTAL HIP: ICD-10-CM

## 2020-12-21 RX ORDER — METHOCARBAMOL 750 MG/1
TABLET, FILM COATED ORAL
Qty: 60 TABLET | Refills: 0 | Status: SHIPPED | OUTPATIENT
Start: 2020-12-21 | End: 2021-02-23

## 2020-12-21 RX ORDER — TRAMADOL HYDROCHLORIDE 50 MG/1
TABLET ORAL
Qty: 60 TABLET | Refills: 0 | Status: SHIPPED | OUTPATIENT
Start: 2020-12-21 | End: 2021-02-23

## 2021-02-23 DIAGNOSIS — Z96.641 HISTORY OF TOTAL RIGHT HIP REPLACEMENT: ICD-10-CM

## 2021-02-23 DIAGNOSIS — S76.219A STRAIN OF ADDUCTOR MUSCLE OF THIGH: ICD-10-CM

## 2021-02-23 DIAGNOSIS — Z96.649 S/P REVISION OF TOTAL HIP: ICD-10-CM

## 2021-02-23 RX ORDER — METHOCARBAMOL 750 MG/1
TABLET, FILM COATED ORAL
Qty: 60 TABLET | Refills: 0 | Status: SHIPPED | OUTPATIENT
Start: 2021-02-23 | End: 2021-07-26

## 2021-02-23 RX ORDER — TRAMADOL HYDROCHLORIDE 50 MG/1
TABLET ORAL
Qty: 60 TABLET | Refills: 0 | Status: SHIPPED | OUTPATIENT
Start: 2021-02-23 | End: 2021-04-21

## 2021-04-21 DIAGNOSIS — Z96.641 HISTORY OF TOTAL RIGHT HIP REPLACEMENT: ICD-10-CM

## 2021-04-21 DIAGNOSIS — Z96.649 S/P REVISION OF TOTAL HIP: ICD-10-CM

## 2021-04-21 RX ORDER — TRAMADOL HYDROCHLORIDE 50 MG/1
TABLET ORAL
Qty: 60 TABLET | Refills: 0 | Status: SHIPPED | OUTPATIENT
Start: 2021-04-21 | End: 2021-06-30

## 2021-05-27 ENCOUNTER — TRANSCRIBE ORDERS (OUTPATIENT)
Dept: ADMINISTRATIVE | Facility: HOSPITAL | Age: 71
End: 2021-05-27

## 2021-05-27 DIAGNOSIS — Z87.891 PERSONAL HISTORY OF TOBACCO USE, PRESENTING HAZARDS TO HEALTH: Primary | ICD-10-CM

## 2021-06-11 ENCOUNTER — OFFICE VISIT (OUTPATIENT)
Dept: ORTHOPEDIC SURGERY | Facility: CLINIC | Age: 71
End: 2021-06-11

## 2021-06-11 VITALS
DIASTOLIC BLOOD PRESSURE: 81 MMHG | HEART RATE: 53 BPM | HEIGHT: 70 IN | WEIGHT: 164 LBS | BODY MASS INDEX: 23.48 KG/M2 | SYSTOLIC BLOOD PRESSURE: 141 MMHG

## 2021-06-11 DIAGNOSIS — Z96.641 HISTORY OF TOTAL RIGHT HIP REPLACEMENT: Primary | ICD-10-CM

## 2021-06-11 DIAGNOSIS — Z96.649 S/P REVISION OF TOTAL HIP: ICD-10-CM

## 2021-06-11 DIAGNOSIS — Z96.642 STATUS POST TOTAL REPLACEMENT OF LEFT HIP: ICD-10-CM

## 2021-06-11 PROCEDURE — 99212 OFFICE O/P EST SF 10 MIN: CPT | Performed by: ORTHOPAEDIC SURGERY

## 2021-06-11 NOTE — PROGRESS NOTES
Orthopaedic Clinic Note: Hip Established Patient    Chief Complaint   Patient presents with   • Follow-up     8 month f/u--Right SALVADOR 2014    &    Left SALVADOR 12/11/2017        HPI    It has been 8  month(s) since Mr. Paz's last visit. He returns to clinic today for follow-up bilateral total hip arthroplasty.  He rates his pain a 1/10 on the pain scale.  He describes a slight ache in the right hip only which is improved with tramadol.  He is doing well from the left total hip arthroplasty.  He is ambulatory with no assistive device.  He is performing all daily activities with minimal limitations.  Overall he remains happy with his outcomes.  Denies any increase in symptoms or complications.    Past Medical History:   Diagnosis Date   • Arthritis    • Kidney stone       Past Surgical History:   Procedure Laterality Date   • HIP ARTHROPLASTY     • HIP SURGERY Right     [facility]   • TOTAL HIP ARTHROPLASTY Left 12/11/2017    Procedure: TOTAL HIP ARTHROPLASTY LEFT;  Surgeon: Reed Lackey MD;  Location: [facility];  Service:       Family History   Problem Relation Age of Onset   • Cancer Mother    • Cancer Father      Social History     Socioeconomic History   • Marital status:      Spouse name: Not on file   • Number of children: Not on file   • Years of education: Not on file   • Highest education level: Not on file   Tobacco Use   • Smoking status: Former Smoker     Packs/day: 1.00     Years: 10.00     Pack years: 10.00     Types: Cigarettes   • Smokeless tobacco: Never Used   Substance and Sexual Activity   • Alcohol use: Yes     Comment: occasional   • Drug use: No   • Sexual activity: Defer      Current Outpatient Medications on File Prior to Visit   Medication Sig Dispense Refill   • cephalexin (KEFLEX) 500 MG capsule Take 500 mg by mouth 3 (Three) Times a Day.  1   • methocarbamol (ROBAXIN) 750 MG tablet TAKE ONE TABLET BY MOUTH FOUR TIMES A DAY 60 tablet 0   • traMADol (ULTRAM) 50 MG tablet  "TAKE ONE TABLET BY MOUTH EVERY 6 HOURS AS NEEDED FOR MODERATE PAIN 60 tablet 0     No current facility-administered medications on file prior to visit.      No Known Allergies     Review of Systems   Constitutional: Negative.    HENT: Negative.    Eyes: Negative.    Respiratory: Negative.    Cardiovascular: Negative.    Gastrointestinal: Negative.    Endocrine: Negative.    Genitourinary: Negative.    Musculoskeletal: Positive for arthralgias.   Skin: Negative.    Allergic/Immunologic: Negative.    Neurological: Negative.    Hematological: Negative.    Psychiatric/Behavioral: Negative.         The patient's Review of Systems was personally reviewed and confirmed as accurate.    Physical Exam  Blood pressure 141/81, pulse 53, height 177.8 cm (70\"), weight 74.4 kg (164 lb).    Body mass index is 23.53 kg/m².    GENERAL APPEARANCE: awake, alert, oriented, in no acute distress and well developed, well nourished  LUNGS:  breathing nonlabored  EXTREMITIES: no clubbing, cyanosis  PERIPHERAL PULSES: palpable dorsalis pedis and posterior tibial pulses bilaterally.    GAIT:  Normal            Hip Exam:  Bilateral     RANGE OF MOTION:  EXTENSION/FLEXION:  normal (0-110 degrees)  IR (at 90 degrees of flexion):  20  ER (at 90 degrees of flexion):  40  PAIN WITH HIP MOTION:  no  PAIN WITH LOGROLL:  no      STINCHFIELD TEST: negative     STRENGTH:  ABDUCTOR:    5/5  ADDUCTOR:  5/5  HIP FLEXION:  5/5     GREATER TROCHANTER BURSAL PAIN:  no    SENSATION TO LIGHT TOUCH:  DEEP PERONEAL/SUPERFICIAL PERONEAL/SURAL/SAPHENOUS/TIBIAL:   intact    EDEMA:  no  ERYTHEMA:  no  WOUNDS/INCISIONS:   yes, well healed surgical incision without evidence of erythema or drainage  _________________________________________________________________  _________________________________________________________________    RADIOGRAPHIC FINDINGS:   No new imaging today    Assessment/Plan:   Diagnosis Plan   1. History of total right hip replacement     2. S/P " revision of total hip     3. Status post total replacement of left hip       Patient is continuing to function well.  His abductor strain has improved significantly.  He has resumed baseline activity levels with baseline symptoms.  Continue tramadol as needed.  He will follow-up with me in 6 months for repeat assessment x-ray P pelvis on return.    Reed Lackey MD  06/11/21  07:34 EDT

## 2021-06-30 DIAGNOSIS — Z96.649 S/P REVISION OF TOTAL HIP: ICD-10-CM

## 2021-06-30 DIAGNOSIS — Z96.641 HISTORY OF TOTAL RIGHT HIP REPLACEMENT: ICD-10-CM

## 2021-06-30 RX ORDER — TRAMADOL HYDROCHLORIDE 50 MG/1
TABLET ORAL
Qty: 60 TABLET | Refills: 0 | Status: SHIPPED | OUTPATIENT
Start: 2021-06-30 | End: 2021-08-26

## 2021-07-24 DIAGNOSIS — S76.219A STRAIN OF ADDUCTOR MUSCLE OF THIGH: ICD-10-CM

## 2021-07-26 RX ORDER — METHOCARBAMOL 750 MG/1
TABLET, FILM COATED ORAL
Qty: 60 TABLET | Refills: 0 | Status: SHIPPED | OUTPATIENT
Start: 2021-07-26 | End: 2021-08-26

## 2021-08-26 DIAGNOSIS — Z96.641 HISTORY OF TOTAL RIGHT HIP REPLACEMENT: ICD-10-CM

## 2021-08-26 DIAGNOSIS — Z96.649 S/P REVISION OF TOTAL HIP: ICD-10-CM

## 2021-08-26 DIAGNOSIS — S76.219A STRAIN OF ADDUCTOR MUSCLE OF THIGH: ICD-10-CM

## 2021-08-26 RX ORDER — METHOCARBAMOL 750 MG/1
TABLET, FILM COATED ORAL
Qty: 60 TABLET | Refills: 0 | OUTPATIENT
Start: 2021-08-26 | End: 2022-01-02

## 2021-08-26 RX ORDER — TRAMADOL HYDROCHLORIDE 50 MG/1
TABLET ORAL
Qty: 60 TABLET | Refills: 0 | Status: SHIPPED | OUTPATIENT
Start: 2021-08-26 | End: 2021-10-18

## 2021-08-26 NOTE — TELEPHONE ENCOUNTER
Patient requesting refill of muscle relaxer; He is also requesting a 30 day supply instead of 15 day supply. Please advise. Thanks!    Jay Jay

## 2021-10-18 DIAGNOSIS — Z96.641 HISTORY OF TOTAL RIGHT HIP REPLACEMENT: ICD-10-CM

## 2021-10-18 DIAGNOSIS — Z96.649 S/P REVISION OF TOTAL HIP: ICD-10-CM

## 2021-10-18 RX ORDER — TRAMADOL HYDROCHLORIDE 50 MG/1
TABLET ORAL
Qty: 60 TABLET | Refills: 0 | Status: SHIPPED | OUTPATIENT
Start: 2021-10-18 | End: 2022-01-11 | Stop reason: SDUPTHER

## 2022-01-02 ENCOUNTER — APPOINTMENT (OUTPATIENT)
Dept: CT IMAGING | Facility: HOSPITAL | Age: 72
End: 2022-01-02

## 2022-01-02 ENCOUNTER — HOSPITAL ENCOUNTER (EMERGENCY)
Facility: HOSPITAL | Age: 72
Discharge: HOME | End: 2022-01-02
Attending: EMERGENCY MEDICINE | Admitting: EMERGENCY MEDICINE

## 2022-01-02 VITALS
RESPIRATION RATE: 16 BRPM | WEIGHT: 165 LBS | HEART RATE: 60 BPM | BODY MASS INDEX: 23.1 KG/M2 | DIASTOLIC BLOOD PRESSURE: 81 MMHG | SYSTOLIC BLOOD PRESSURE: 127 MMHG | HEIGHT: 71 IN | TEMPERATURE: 97.9 F | OXYGEN SATURATION: 94 %

## 2022-01-02 DIAGNOSIS — T14.8XXA INTRAMUSCULAR HEMATOMA: ICD-10-CM
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
## 2022-01-02 DIAGNOSIS — S06.0X1A CONCUSSION WITH LOSS OF CONSCIOUSNESS OF 30 MINUTES OR LESS, INITIAL ENCOUNTER: ICD-10-CM

## 2022-01-02 LAB
ALBUMIN SERPL-MCNC: 4 G/DL (ref 3.5–5.2)
ALBUMIN/GLOB SERPL: 2.5 G/DL
ALP SERPL-CCNC: 80 U/L (ref 39–117)
ALT SERPL W P-5'-P-CCNC: 17 U/L (ref 1–41)
ANION GAP SERPL CALCULATED.3IONS-SCNC: 9 MMOL/L (ref 5–15)
AST SERPL-CCNC: 26 U/L (ref 1–40)
BACTERIA UR QL AUTO: ABNORMAL /HPF
BASOPHILS # BLD AUTO: 0.05 10*3/MM3 (ref 0–0.2)
BASOPHILS NFR BLD AUTO: 0.5 % (ref 0–1.5)
BILIRUB SERPL-MCNC: 0.3 MG/DL (ref 0–1.2)
BILIRUB UR QL STRIP: NEGATIVE
BUN SERPL-MCNC: 18 MG/DL (ref 8–23)
BUN/CREAT SERPL: 27.7 (ref 7–25)
CALCIUM SPEC-SCNC: 9 MG/DL (ref 8.6–10.5)
CHLORIDE SERPL-SCNC: 103 MMOL/L (ref 98–107)
CLARITY UR: CLEAR
CO2 SERPL-SCNC: 26 MMOL/L (ref 22–29)
COD CRY URNS QL: ABNORMAL /HPF
COLOR UR: ABNORMAL
CREAT SERPL-MCNC: 0.65 MG/DL (ref 0.76–1.27)
DEPRECATED RDW RBC AUTO: 46.8 FL (ref 37–54)
EOSINOPHIL # BLD AUTO: 0.1 10*3/MM3 (ref 0–0.4)
EOSINOPHIL NFR BLD AUTO: 1 % (ref 0.3–6.2)
ERYTHROCYTE [DISTWIDTH] IN BLOOD BY AUTOMATED COUNT: 12.6 % (ref 12.3–15.4)
GFR SERPL CREATININE-BSD FRML MDRD: 121 ML/MIN/1.73
GLOBULIN UR ELPH-MCNC: 1.6 GM/DL
GLUCOSE SERPL-MCNC: 109 MG/DL (ref 65–99)
GLUCOSE UR STRIP-MCNC: NEGATIVE MG/DL
HCT VFR BLD AUTO: 39.4 % (ref 37.5–51)
HGB BLD-MCNC: 13.5 G/DL (ref 13–17.7)
HGB UR QL STRIP.AUTO: ABNORMAL
HYALINE CASTS UR QL AUTO: ABNORMAL /LPF
IMM GRANULOCYTES # BLD AUTO: 0.05 10*3/MM3 (ref 0–0.05)
IMM GRANULOCYTES NFR BLD AUTO: 0.5 % (ref 0–0.5)
KETONES UR QL STRIP: ABNORMAL
LEUKOCYTE ESTERASE UR QL STRIP.AUTO: ABNORMAL
LYMPHOCYTES # BLD AUTO: 0.85 10*3/MM3 (ref 0.7–3.1)
LYMPHOCYTES NFR BLD AUTO: 8.5 % (ref 19.6–45.3)
MCH RBC QN AUTO: 34.4 PG (ref 26.6–33)
MCHC RBC AUTO-ENTMCNC: 34.3 G/DL (ref 31.5–35.7)
MCV RBC AUTO: 100.3 FL (ref 79–97)
MONOCYTES # BLD AUTO: 0.92 10*3/MM3 (ref 0.1–0.9)
MONOCYTES NFR BLD AUTO: 9.2 % (ref 5–12)
NEUTROPHILS NFR BLD AUTO: 8.04 10*3/MM3 (ref 1.7–7)
NEUTROPHILS NFR BLD AUTO: 80.3 % (ref 42.7–76)
NITRITE UR QL STRIP: NEGATIVE
NRBC BLD AUTO-RTO: 0 /100 WBC (ref 0–0.2)
PH UR STRIP.AUTO: 5.5 [PH] (ref 5–8)
PLATELET # BLD AUTO: 236 10*3/MM3 (ref 140–450)
PMV BLD AUTO: 10.3 FL (ref 6–12)
POTASSIUM SERPL-SCNC: 4.3 MMOL/L (ref 3.5–5.2)
PROT SERPL-MCNC: 5.6 G/DL (ref 6–8.5)
PROT UR QL STRIP: ABNORMAL
RBC # BLD AUTO: 3.93 10*6/MM3 (ref 4.14–5.8)
RBC # UR STRIP: ABNORMAL /HPF
REF LAB TEST METHOD: ABNORMAL
SODIUM SERPL-SCNC: 138 MMOL/L (ref 136–145)
SP GR UR STRIP: >=1.03 (ref 1–1.03)
SQUAMOUS #/AREA URNS HPF: ABNORMAL /HPF
UROBILINOGEN UR QL STRIP: ABNORMAL
WBC # UR STRIP: ABNORMAL /HPF
WBC NRBC COR # BLD: 10.01 10*3/MM3 (ref 3.4–10.8)

## 2022-01-02 PROCEDURE — 96375 TX/PRO/DX INJ NEW DRUG ADDON: CPT

## 2022-01-02 PROCEDURE — 72131 CT LUMBAR SPINE W/O DYE: CPT

## 2022-01-02 PROCEDURE — 81001 URINALYSIS AUTO W/SCOPE: CPT | Performed by: EMERGENCY MEDICINE

## 2022-01-02 PROCEDURE — 96374 THER/PROPH/DIAG INJ IV PUSH: CPT

## 2022-01-02 PROCEDURE — 25010000002 HYDROMORPHONE PER 4 MG: Performed by: EMERGENCY MEDICINE

## 2022-01-02 PROCEDURE — 25010000002 ONDANSETRON PER 1 MG: Performed by: EMERGENCY MEDICINE

## 2022-01-02 PROCEDURE — 80053 COMPREHEN METABOLIC PANEL: CPT | Performed by: EMERGENCY MEDICINE

## 2022-01-02 PROCEDURE — 70450 CT HEAD/BRAIN W/O DYE: CPT

## 2022-01-02 PROCEDURE — 85025 COMPLETE CBC W/AUTO DIFF WBC: CPT | Performed by: EMERGENCY MEDICINE

## 2022-01-02 PROCEDURE — 99283 EMERGENCY DEPT VISIT LOW MDM: CPT

## 2022-01-02 PROCEDURE — 74176 CT ABD & PELVIS W/O CONTRAST: CPT

## 2022-01-02 RX ORDER — HYDROMORPHONE HYDROCHLORIDE 1 MG/ML
0.5 INJECTION, SOLUTION INTRAMUSCULAR; INTRAVENOUS; SUBCUTANEOUS ONCE
Status: COMPLETED | OUTPATIENT
Start: 2022-01-02 | End: 2022-01-02

## 2022-01-02 RX ORDER — VALACYCLOVIR HYDROCHLORIDE 500 MG/1
500 TABLET, FILM COATED ORAL DAILY
COMMUNITY

## 2022-01-02 RX ORDER — HYDROXYZINE 50 MG/1
50 TABLET, FILM COATED ORAL NIGHTLY PRN
COMMUNITY

## 2022-01-02 RX ORDER — ONDANSETRON 2 MG/ML
4 INJECTION INTRAMUSCULAR; INTRAVENOUS ONCE
Status: COMPLETED | OUTPATIENT
Start: 2022-01-02 | End: 2022-01-02

## 2022-01-02 RX ORDER — OXYCODONE HYDROCHLORIDE AND ACETAMINOPHEN 5; 325 MG/1; MG/1
1 TABLET ORAL EVERY 4 HOURS PRN
Qty: 12 TABLET | Refills: 0 | Status: SHIPPED | OUTPATIENT
Start: 2022-01-02 | End: 2022-06-15

## 2022-01-02 RX ORDER — QUETIAPINE FUMARATE 25 MG/1
25 TABLET, FILM COATED ORAL NIGHTLY
COMMUNITY

## 2022-01-02 RX ORDER — SERTRALINE HYDROCHLORIDE 100 MG/1
200 TABLET, FILM COATED ORAL DAILY
COMMUNITY

## 2022-01-02 RX ADMIN — ONDANSETRON 4 MG: 2 INJECTION INTRAMUSCULAR; INTRAVENOUS at 12:27

## 2022-01-02 RX ADMIN — HYDROMORPHONE HYDROCHLORIDE 0.5 MG: 1 INJECTION, SOLUTION INTRAMUSCULAR; INTRAVENOUS; SUBCUTANEOUS at 12:27

## 2022-01-02 NOTE — ED PROVIDER NOTES
Subjective   Mr. Paz is brought by EMS after falling at home.  He remembers sweeping the stairs and then falling down headfirst about 9 stairs.  He reports that his memory is not entirely clear after the event.  He called his brother and does not remember doing so.  They called EMS.  EMS found him sitting up in chair.  He had difficulty walking due to back pain.  He denies any recent illnesses.  He tells me the only thing that hurts right now is his left flank area.  He denies urinary incontinence or biting his tongue.  He tells me he does remember tumbling down the stairs.  He got his Covid booster this morning.      History provided by:  Patient  Fall  Mechanism of injury: fall    Injury location:  Torso  Torso injury location:  L flank  Incident location:  Home  Arrived directly from scene: yes    Fall:     Fall occurred:  Down stairs    Height of fall:  9 stairs    Impact surface:  Hard floor  Associated symptoms: back pain    Associated symptoms: no chest pain and no headaches        Review of Systems   Constitutional: Negative for chills and fever.   HENT: Negative for congestion and rhinorrhea.    Respiratory: Negative for cough and shortness of breath.    Cardiovascular: Negative for chest pain.   Genitourinary: Negative for hematuria.   Musculoskeletal: Positive for back pain.   Neurological: Negative for weakness and headaches.   Psychiatric/Behavioral: Positive for confusion.   All other systems reviewed and are negative.      Past Medical History:   Diagnosis Date   • Arthritis    • Kidney stone        No Known Allergies    Past Surgical History:   Procedure Laterality Date   • HIP ARTHROPLASTY     • HIP SURGERY Right     [facility]   • TOTAL HIP ARTHROPLASTY Left 12/11/2017    Procedure: TOTAL HIP ARTHROPLASTY LEFT;  Surgeon: Reed Lackey MD;  Location: [facility];  Service:        Family History   Problem Relation Age of Onset   • Cancer Mother    • Cancer Father        Social History      Socioeconomic History   • Marital status:    Tobacco Use   • Smoking status: Former Smoker     Packs/day: 1.00     Years: 10.00     Pack years: 10.00     Types: Cigarettes   • Smokeless tobacco: Never Used   Substance and Sexual Activity   • Alcohol use: Yes     Comment: occasional   • Drug use: No   • Sexual activity: Defer           Objective   Physical Exam  Vitals and nursing note reviewed.   Constitutional:       General: He is not in acute distress.     Appearance: Normal appearance.   HENT:      Head: Normocephalic and atraumatic.      Comments: I do not palpate a bump or any tenderness on his scalp     Nose: Nose normal. No congestion or rhinorrhea.      Mouth/Throat:      Mouth: Mucous membranes are moist.      Pharynx: No posterior oropharyngeal erythema.   Eyes:      General: No scleral icterus.     Conjunctiva/sclera: Conjunctivae normal.   Neck:      Comments: No JVD  Cardiovascular:      Rate and Rhythm: Normal rate and regular rhythm.      Heart sounds: No murmur heard.  No friction rub.   Pulmonary:      Effort: Pulmonary effort is normal. No respiratory distress.      Breath sounds: Normal breath sounds. No wheezing or rales.   Abdominal:      General: Bowel sounds are normal.      Palpations: Abdomen is soft.      Tenderness: There is no abdominal tenderness. There is no guarding or rebound.   Musculoskeletal:         General: Tenderness present.      Cervical back: Normal range of motion and neck supple. No tenderness.      Right lower leg: No edema.      Left lower leg: No edema.      Comments: He is tender to palpate over the left lumbar paraspinous area   Skin:     General: Skin is warm and dry.      Capillary Refill: Capillary refill takes less than 2 seconds.      Coloration: Skin is not pale.   Neurological:      General: No focal deficit present.      Mental Status: He is alert and oriented to person, place, and time.      Coordination: Coordination normal.   Psychiatric:          Mood and Affect: Mood normal.         Behavior: Behavior normal.         Thought Content: Thought content normal.         Procedures           ED Course  ED Course as of 01/02/22 2039   Sun Jan 02, 2022   1340 I spoke with Mr. Paz and his daughter about findings.  He tells me he is moving his leg a little bit better.  We are awaiting urine. [DT]      ED Course User Index  [DT] Nii Hernández MD                                                 MDM  Number of Diagnoses or Management Options  Concussion with loss of consciousness of 30 minutes or less, initial encounter: new and requires workup  Fall down stairs, initial encounter: new and requires workup  Intramuscular hematoma: new and requires workup     Amount and/or Complexity of Data Reviewed  Clinical lab tests: ordered and reviewed  Tests in the radiology section of CPT®: ordered and reviewed  Review and summarize past medical records: yes  Independent visualization of images, tracings, or specimens: yes    Patient Progress  Patient progress: improved      Final diagnoses:   Fall down stairs, initial encounter   Concussion with loss of consciousness of 30 minutes or less, initial encounter   Intramuscular hematoma       ED Disposition  ED Disposition     ED Disposition Condition Comment    Discharge Stable           Stephen Lou MD  [address] [id number] [phone]               Medication List      New Prescriptions    oxyCODONE-acetaminophen 5-325 MG per tablet  Commonly known as: PERCOCET  Take 1 tablet by mouth Every 4 (Four) Hours As Needed (Pain).        Stop    methocarbamol 750 MG tablet  Commonly known as: ROBAXIN           Where to Get Your Medications      These medications were sent to [facility] - [place], [place] - [address] [place] [address] - [phone]  - [phone]   [address] [facility] [address]    Phone: [phone]   · oxyCODONE-acetaminophen 5-325 MG per tablet          Nii Hernández MD  01/02/22  2040

## 2022-01-02 NOTE — DISCHARGE INSTRUCTIONS
Do not drive while taking the pain medicine I have prescribed as it will make you sleepy.  Try to remain as active as possible.  Return here if significantly worsening pain, weakness or dizziness, or any other concerns.  Drink extra fluids over the next few days.  Call your primary care provider tomorrow and arrange close follow-up with them.

## 2022-01-11 ENCOUNTER — OFFICE VISIT (OUTPATIENT)
Dept: ORTHOPEDIC SURGERY | Facility: CLINIC | Age: 72
End: 2022-01-11

## 2022-01-11 VITALS
SYSTOLIC BLOOD PRESSURE: 130 MMHG | WEIGHT: 165 LBS | BODY MASS INDEX: 23.1 KG/M2 | DIASTOLIC BLOOD PRESSURE: 68 MMHG | HEIGHT: 71 IN

## 2022-01-11 DIAGNOSIS — Z96.641 HISTORY OF TOTAL RIGHT HIP REPLACEMENT: Primary | ICD-10-CM

## 2022-01-11 DIAGNOSIS — Z96.649 S/P REVISION OF TOTAL HIP: ICD-10-CM

## 2022-01-11 PROCEDURE — 99213 OFFICE O/P EST LOW 20 MIN: CPT | Performed by: ORTHOPAEDIC SURGERY

## 2022-01-11 RX ORDER — TRAMADOL HYDROCHLORIDE 50 MG/1
50 TABLET ORAL EVERY 6 HOURS PRN
Qty: 60 TABLET | Refills: 0 | Status: SHIPPED | OUTPATIENT
Start: 2022-01-11 | End: 2022-03-01

## 2022-01-11 NOTE — PROGRESS NOTES
Orthopaedic Clinic Note: Hip Established Patient    Chief Complaint   Patient presents with   • Follow-up     7 month f/u--History of total right hip replacement         HPI    It has been 7  month(s) since Mr. Paz's last visit. He returns to clinic today for Follow-up right total hip arthroplasty.  He is approximately 8 years out from a right revision hip arthroplasty.  He continues to have some occasional thigh pain that he rates a 1/10 on the pain scale.  He is ambulating with no assistive device.  Denies fevers chills or constitutional symptoms.  Overall he is doing about the same.    Past Medical History:   Diagnosis Date   • Arthritis    • Kidney stone       Past Surgical History:   Procedure Laterality Date   • HIP ARTHROPLASTY     • HIP SURGERY Right     [facility]   • TOTAL HIP ARTHROPLASTY Left 12/11/2017    Procedure: TOTAL HIP ARTHROPLASTY LEFT;  Surgeon: Reed Lakcey MD;  Location: [facility];  Service:       Family History   Problem Relation Age of Onset   • Cancer Mother    • Cancer Father      Social History     Socioeconomic History   • Marital status:    Tobacco Use   • Smoking status: Former Smoker     Packs/day: 1.00     Years: 10.00     Pack years: 10.00     Types: Cigarettes   • Smokeless tobacco: Never Used   Substance and Sexual Activity   • Alcohol use: Yes     Comment: occasional   • Drug use: No   • Sexual activity: Defer      Current Outpatient Medications on File Prior to Visit   Medication Sig Dispense Refill   • cephalexin (KEFLEX) 500 MG capsule Take 500 mg by mouth 3 (Three) Times a Day.  1   • hydrOXYzine (ATARAX) 50 MG tablet Take 50 mg by mouth At Night As Needed for Itching.     • oxyCODONE-acetaminophen (PERCOCET) 5-325 MG per tablet Take 1 tablet by mouth Every 4 (Four) Hours As Needed (Pain). 12 tablet 0   • QUEtiapine (SEROquel) 25 MG tablet Take 25 mg by mouth Every Night.     • sertraline (ZOLOFT) 100 MG tablet Take 200 mg by mouth Daily.     •  "valACYclovir (VALTREX) 500 MG tablet Take 500 mg by mouth Daily.     • [DISCONTINUED] traMADol (ULTRAM) 50 MG tablet TAKE ONE TABLET BY MOUTH EVERY 6 HOURS AS NEEDED FOR MODERATE PAIN 60 tablet 0     No current facility-administered medications on file prior to visit.      No Known Allergies     Review of Systems   Constitutional: Negative.    HENT: Negative.    Eyes: Negative.    Respiratory: Negative.    Cardiovascular: Negative.    Gastrointestinal: Negative.    Endocrine: Negative.    Genitourinary: Negative.    Musculoskeletal: Positive for arthralgias.   Skin: Negative.    Allergic/Immunologic: Negative.    Neurological: Negative.    Hematological: Negative.    Psychiatric/Behavioral: Negative.         The patient's Review of Systems was personally reviewed and confirmed as accurate.    Physical Exam  Blood pressure 130/68, height 180.3 cm (70.98\"), weight 74.8 kg (165 lb).    Body mass index is 23.02 kg/m².    GENERAL APPEARANCE: awake, alert, oriented, in no acute distress and well developed, well nourished  LUNGS:  breathing nonlabored  EXTREMITIES: no clubbing, cyanosis  PERIPHERAL PULSES: palpable dorsalis pedis and posterior tibial pulses bilaterally.    GAIT:  Normal            Hip Exam: Right     RANGE OF MOTION:  EXTENSION/FLEXION:  normal (0-110 degrees)  IR (at 90 degrees of flexion):  20  ER (at 90 degrees of flexion):  40  PAIN WITH HIP MOTION:  no  PAIN WITH LOGROLL:  no      STINCHFIELD TEST: negative     STRENGTH:  ABDUCTOR:    5/5  ADDUCTOR:  5/5  HIP FLEXION:  5/5     GREATER TROCHANTER BURSAL PAIN:  no    SENSATION TO LIGHT TOUCH:  DEEP PERONEAL/SUPERFICIAL PERONEAL/SURAL/SAPHENOUS/TIBIAL:   intact    EDEMA:  no  ERYTHEMA:  no  WOUNDS/INCISIONS:   yes, well healed surgical incision without evidence of erythema or drainage  _________________________________________________________________  _________________________________________________________________    RADIOGRAPHIC FINDINGS: "   Indication: Status post revision right hip arthroplasty    Comparison: Todays xrays were compared to previous xrays from 10/1/2020    AP pelvis: Right: Demonstrate a well positioned revision total hip without evidence of wear, loosening, fracture or osteolysis, femoral head is concentrically reduced within the acetabulum and No significant changes compared to prior radiographs.;Left: Demonstrate a well positioned total hip without evidence of wear, loosening, fracture or osteolysis, femoral head is concentrically reduced within the acetabulum and No significant changes compared to prior radiographs.      Assessment/Plan:   Diagnosis Plan   1. History of total right hip replacement  XR Pelvis 1 or 2 View    traMADol (ULTRAM) 50 MG tablet   2. S/P revision of total hip  traMADol (ULTRAM) 50 MG tablet     Patient is doing well 8 years status post revision right hip arthroplasty.  I reassured him that I see no clinical or radiographic evidence of complication.  We will refill his tramadol as requested.  He will follow-up in 6 months for repeat assessment with x-ray AP pelvis on return.    Reed Lackey MD  01/11/22  07:56 EST

## 2022-02-28 DIAGNOSIS — Z96.641 HISTORY OF TOTAL RIGHT HIP REPLACEMENT: ICD-10-CM

## 2022-02-28 DIAGNOSIS — S76.219A STRAIN OF ADDUCTOR MUSCLE OF THIGH: ICD-10-CM

## 2022-02-28 DIAGNOSIS — Z96.649 S/P REVISION OF TOTAL HIP: ICD-10-CM

## 2022-03-01 RX ORDER — METHOCARBAMOL 750 MG/1
TABLET, FILM COATED ORAL
Qty: 60 TABLET | Refills: 0 | Status: SHIPPED | OUTPATIENT
Start: 2022-03-01 | End: 2022-06-27

## 2022-03-01 RX ORDER — TRAMADOL HYDROCHLORIDE 50 MG/1
TABLET ORAL
Qty: 60 TABLET | Refills: 0 | Status: SHIPPED | OUTPATIENT
Start: 2022-03-01 | End: 2022-04-05 | Stop reason: SDUPTHER

## 2022-04-05 ENCOUNTER — OFFICE VISIT (OUTPATIENT)
Dept: ORTHOPEDIC SURGERY | Facility: CLINIC | Age: 72
End: 2022-04-05

## 2022-04-05 VITALS
WEIGHT: 160.4 LBS | BODY MASS INDEX: 22.46 KG/M2 | SYSTOLIC BLOOD PRESSURE: 110 MMHG | DIASTOLIC BLOOD PRESSURE: 64 MMHG | HEIGHT: 71 IN

## 2022-04-05 DIAGNOSIS — Z96.649 S/P REVISION OF TOTAL HIP: ICD-10-CM

## 2022-04-05 DIAGNOSIS — Z96.641 HISTORY OF TOTAL RIGHT HIP REPLACEMENT: ICD-10-CM

## 2022-04-05 DIAGNOSIS — M25.561 PAIN IN BOTH KNEES, UNSPECIFIED CHRONICITY: ICD-10-CM

## 2022-04-05 DIAGNOSIS — M25.562 PAIN IN BOTH KNEES, UNSPECIFIED CHRONICITY: ICD-10-CM

## 2022-04-05 DIAGNOSIS — M17.0 PRIMARY OSTEOARTHRITIS OF BOTH KNEES: Primary | ICD-10-CM

## 2022-04-05 PROCEDURE — 99214 OFFICE O/P EST MOD 30 MIN: CPT | Performed by: ORTHOPAEDIC SURGERY

## 2022-04-05 RX ORDER — TAMSULOSIN HYDROCHLORIDE 0.4 MG/1
1 CAPSULE ORAL DAILY
COMMUNITY
Start: 2022-03-09

## 2022-04-05 RX ORDER — CELECOXIB 100 MG/1
100 CAPSULE ORAL 2 TIMES DAILY
Qty: 60 CAPSULE | Refills: 0 | Status: SHIPPED | OUTPATIENT
Start: 2022-04-05 | End: 2022-06-15 | Stop reason: SDUPTHER

## 2022-04-05 RX ORDER — ANASTROZOLE 1 MG/1
TABLET ORAL
COMMUNITY
Start: 2022-03-16

## 2022-04-05 RX ORDER — TRAMADOL HYDROCHLORIDE 50 MG/1
50 TABLET ORAL EVERY 6 HOURS PRN
Qty: 60 TABLET | Refills: 0 | Status: SHIPPED | OUTPATIENT
Start: 2022-04-05 | End: 2022-04-21

## 2022-04-05 RX ORDER — CELECOXIB 100 MG/1
100 CAPSULE ORAL DAILY
COMMUNITY
Start: 2022-03-16 | End: 2022-06-15 | Stop reason: SDUPTHER

## 2022-04-05 NOTE — PROGRESS NOTES
Orthopaedic Clinic Note: Knee New Patient    Chief Complaint   Patient presents with   • Left Knee - Pain   • Right Knee - Pain        HPI    Prashanth Paz IV is a 71 y.o. male who presents with bilateral knee pain for 2 month(s). Onset atraumatic and gradual in nature. Pain is localized to the patella and is a 3/10 on the pain scale. Pain is described as dull. Associated symptoms include pain, popping and stiffness. The pain is worse with walking, standing and rising from seated position; resting, sitting and ice make it better. Previous treatments have included: NSAIDS since symptom onset. Although some transient relief was reported with these interventions, these conservative measures have failed and symptoms have persisted. The patient is limited in daily activities and has had a significant decrease in quality of life as a result. He denies fevers, chills, or constitutional symptoms.    I have reviewed the following portions of the patient's history:History of Present Illness    Past Medical History:   Diagnosis Date   • Arthritis    • Kidney stone       Past Surgical History:   Procedure Laterality Date   • HIP ARTHROPLASTY     • HIP SURGERY Right     [facility]   • TOTAL HIP ARTHROPLASTY Left 12/11/2017    Procedure: TOTAL HIP ARTHROPLASTY LEFT;  Surgeon: Reed Lackey MD;  Location: [facility];  Service:       Family History   Problem Relation Age of Onset   • Cancer Mother    • Cancer Father      Social History     Socioeconomic History   • Marital status:    Tobacco Use   • Smoking status: Former Smoker     Packs/day: 1.00     Years: 10.00     Pack years: 10.00     Types: Cigarettes   • Smokeless tobacco: Never Used   Substance and Sexual Activity   • Alcohol use: Yes     Comment: occasional   • Drug use: No   • Sexual activity: Defer      Current Outpatient Medications on File Prior to Visit   Medication Sig Dispense Refill   • anastrozole (ARIMIDEX) 1 MG tablet TAKE ONE TABLET THREE  "TIMES PER WEEK     • celecoxib (CeleBREX) 100 MG capsule Take 100 mg by mouth Daily.     • cephalexin (KEFLEX) 500 MG capsule Take 500 mg by mouth 3 (Three) Times a Day.  1   • Diclofenac Sodium (VOLTAREN) 1 % gel gel APPLY AS NEEDED TO AFFECTED JOINT     • hydrOXYzine (ATARAX) 50 MG tablet Take 50 mg by mouth At Night As Needed for Itching.     • methocarbamol (ROBAXIN) 750 MG tablet TAKE ONE TABLET BY MOUTH FOUR TIMES A DAY 60 tablet 0   • oxyCODONE-acetaminophen (PERCOCET) 5-325 MG per tablet Take 1 tablet by mouth Every 4 (Four) Hours As Needed (Pain). 12 tablet 0   • QUEtiapine (SEROquel) 25 MG tablet Take 25 mg by mouth Every Night.     • sertraline (ZOLOFT) 100 MG tablet Take 200 mg by mouth Daily.     • tamsulosin (FLOMAX) 0.4 MG capsule 24 hr capsule Take 1 capsule by mouth Daily.     • valACYclovir (VALTREX) 500 MG tablet Take 500 mg by mouth Daily.     • [DISCONTINUED] traMADol (ULTRAM) 50 MG tablet TAKE ONE TABLET BY MOUTH EVERY 6 HOURS AS NEEDED FOR MODERATE PAIN 60 tablet 0     No current facility-administered medications on file prior to visit.      No Known Allergies     Review of Systems   Constitutional: Negative.    HENT: Negative.    Eyes: Negative.    Respiratory: Negative.    Cardiovascular: Negative.    Gastrointestinal: Negative.    Endocrine: Negative.    Genitourinary: Negative.    Musculoskeletal: Positive for arthralgias.   Skin: Negative.    Allergic/Immunologic: Negative.    Neurological: Negative.    Hematological: Negative.    Psychiatric/Behavioral: Negative.         The patient's Review of Systems was personally reviewed and confirmed as accurate.    The following portions of the patient's history were reviewed and updated as appropriate: allergies, current medications, past family history, past medical history, past social history, past surgical history and problem list.    Physical Exam  Blood pressure 110/64, height 180.3 cm (70.98\"), weight 72.8 kg (160 lb 6.4 oz).    Body mass " index is 22.38 kg/m².    GENERAL APPEARANCE: awake, alert & oriented x 3, in no acute distress and well developed, well nourished  PSYCH: normal affect  LUNGS:  breathing nonlabored  EYES: sclera anicteric  CARDIOVASCULAR: palpable dorsalis pedis, palpable posterior tibial bilaterally. Capillary refill less than 2 seconds  EXTREMITIES: no clubbing, cyanosis  GAIT:  Trendelenberg            Right Lower Extremity Exam:   ----------  Hip Exam  ----------  FLEXION CONTRACTURE: None  FLEXION: 110 degrees  INTERNAL ROTATION: 20 degrees at 90 degrees of flexion   EXTERNAL ROTATION: 40 degrees at 90 degrees of flexion    PAIN WITH HIP MOTION: no  ----------  Knee Exam  ----------  ALIGNMENT: neutral, no varus or valgus deformity     RANGE OF MOTION:  Normal (0-120 degrees) with no extensor lag or flexion contracture  LIGAMENTOUS STABILITY:   stable to varus and valgus stress at terminal extension and 30 degrees without any evidence of laxity     STRENGTH:  5/5 knee flexion, extension. 5/5 ankle dorsiflexion and plantarflexion.     PAIN WITH PALPATION: denies tenderness to palpation about the knee, denies medial or lateral joint line pain  KNEE EFFUSION: no  PAIN WITH KNEE ROM: no  PATELLAR CREPITUS: yes, painful and symptomatic  SPECIAL EXAM FINDINGS:  Negative patellar compression    REFLEXES:  PATELLAR 2+/4  ACHILLES 2+/4    CLONUS: negative  STRAIGHT LEG TEST:   negative    SENSATION TO LIGHT TOUCH:  DEEP PERONEAL/SUPERFICIAL PERONEAL/SURAL/SAPHENOUS/TIBIAL:   intact    EDEMA:  no  ERYTHEMA:  no  WOUNDS/INCISIONS: no overlying skin problems.      Left Lower Extremity Exam:   ----------  Hip Exam  ----------  FLEXION CONTRACTURE: None  FLEXION: 110 degrees  INTERNAL ROTATION: 20 degrees at 90 degrees of flexion   EXTERNAL ROTATION: 40 degrees at 90 degrees of flexion    PAIN WITH HIP MOTION: no  ----------  Knee Exam  ----------  ALIGNMENT: neutral, no varus or valgus deformity     RANGE OF MOTION:  Normal (0-120 degrees)  with no extensor lag or flexion contracture  LIGAMENTOUS STABILITY:   stable to varus and valgus stress at terminal extension and 30 degrees without any evidence of laxity     STRENGTH:  5/5 knee flexion, extension. 5/5 ankle dorsiflexion and plantarflexion.     PAIN WITH PALPATION: Tender to palpation about anterior knee, denies medial or lateral joint line pain  KNEE EFFUSION: Trace effusion  PAIN WITH KNEE ROM: no  PATELLAR CREPITUS: yes, painful and symptomatic  SPECIAL EXAM FINDINGS:  Negative patellar compression    REFLEXES:  PATELLAR 2+/4  ACHILLES 2+/4    CLONUS: negative  STRAIGHT LEG TEST:   negative    SENSATION TO LIGHT TOUCH:  DEEP PERONEAL/SUPERFICIAL PERONEAL/SURAL/SAPHENOUS/TIBIAL:   intact    EDEMA:  no  ERYTHEMA:  no  WOUNDS/INCISIONS: no overlying skin problems.    ______________________________________________________________________  ______________________________________________________________________    RADIOGRAPHIC FINDINGS:   Indication: Bilateral knee pain    Comparison: No prior xrays are available for comparison    Bilateral knee(s) 4 views: Mild to moderate tricompartmental osteoarthritis with neutral alignment.  Small periarticular osteophytes visualized in all compartments.  Moderate to severe left knee patellofemoral arthritis.  No acute bony injury or fracture.      Assessment/Plan:   Diagnosis Plan   1. Primary osteoarthritis of both knees  celecoxib (CeleBREX) 100 MG capsule   2. Pain in both knees, unspecified chronicity  XR Knee 4+ View Bilateral   3. History of total right hip replacement  traMADol (ULTRAM) 50 MG tablet   4. S/P revision of total hip  traMADol (ULTRAM) 50 MG tablet     Patient suffering from arthritic flareup of the bilateral knees.  I discussed treatment options with him.  He is currently taking Celebrex 100 mg daily.  We will increase this to 100 mg twice daily.  If his symptoms fail to adequately improve with this, intra-articular cortisone injection would  be an option.  However he is not interested in pursuing this at this time.  He will follow-up as needed.    Rede Lackey MD  04/05/22  15:40 EDT

## 2022-04-20 DIAGNOSIS — Z96.641 HISTORY OF TOTAL RIGHT HIP REPLACEMENT: ICD-10-CM

## 2022-04-20 DIAGNOSIS — Z96.649 S/P REVISION OF TOTAL HIP: ICD-10-CM

## 2022-04-21 RX ORDER — TRAMADOL HYDROCHLORIDE 50 MG/1
TABLET ORAL
Qty: 60 TABLET | Refills: 0 | Status: SHIPPED | OUTPATIENT
Start: 2022-04-21 | End: 2022-07-12 | Stop reason: SDUPTHER

## 2022-05-09 RX ORDER — CELECOXIB 100 MG/1
CAPSULE ORAL
Qty: 60 CAPSULE | Refills: 0 | Status: SHIPPED | OUTPATIENT
Start: 2022-05-09 | End: 2022-05-31

## 2022-05-31 RX ORDER — CELECOXIB 100 MG/1
CAPSULE ORAL
Qty: 60 CAPSULE | Refills: 0 | Status: SHIPPED | OUTPATIENT
Start: 2022-05-31

## 2022-06-15 ENCOUNTER — OFFICE VISIT (OUTPATIENT)
Dept: ORTHOPEDIC SURGERY | Facility: CLINIC | Age: 72
End: 2022-06-15

## 2022-06-15 VITALS
SYSTOLIC BLOOD PRESSURE: 128 MMHG | DIASTOLIC BLOOD PRESSURE: 82 MMHG | WEIGHT: 160 LBS | BODY MASS INDEX: 22.4 KG/M2 | HEIGHT: 71 IN

## 2022-06-15 DIAGNOSIS — M79.604 RIGHT LEG PAIN: Primary | ICD-10-CM

## 2022-06-15 DIAGNOSIS — M70.51 PES ANSERINUS BURSITIS OF RIGHT KNEE: ICD-10-CM

## 2022-06-15 PROCEDURE — 20610 DRAIN/INJ JOINT/BURSA W/O US: CPT | Performed by: PHYSICIAN ASSISTANT

## 2022-06-15 PROCEDURE — 99213 OFFICE O/P EST LOW 20 MIN: CPT | Performed by: PHYSICIAN ASSISTANT

## 2022-06-15 RX ADMIN — TRIAMCINOLONE ACETONIDE 40 MG: 40 INJECTION, SUSPENSION INTRA-ARTICULAR; INTRAMUSCULAR at 15:49

## 2022-06-15 RX ADMIN — LIDOCAINE HYDROCHLORIDE 1 ML: 10 INJECTION, SOLUTION EPIDURAL; INFILTRATION; INTRACAUDAL; PERINEURAL at 15:49

## 2022-06-15 NOTE — PROGRESS NOTES
Procedure   - Large Joint Arthrocentesis: R knee on 6/15/2022 3:49 PM  Indications: pain  Details: 22 G needle, anteromedial approach  Medications: 40 mg triamcinolone acetonide 40 MG/ML; 1 mL lidocaine PF 1% 1 %  Outcome: tolerated well, no immediate complications  Procedure, treatment alternatives, risks and benefits explained, specific risks discussed. Consent was given by the patient. Immediately prior to procedure a time out was called to verify the correct patient, procedure, equipment, support staff and site/side marked as required. Patient was prepped and draped in the usual sterile fashion.

## 2022-06-15 NOTE — PROGRESS NOTES
"        [facility] [place] [facility] [place]        Subjective     CC: Pain of the Right Leg      HPI    Prashanth Paz IV is a 71 y.o. male.  Patient presents today with a new problem.  He is status post bilateral hips with Dr. Lackey and those are doing well.  He complains of 1 week history of proximal tibia pain after walking a 5K.  He reports that is improved.  He has pain 3/10 with walking that is dull and aching.  He has treated with decreased walking activity, rest, anti-inflammatories.  Here for further evaluation.      ROS:    Constiutional:Pt denies fever, chills, nausea, or vomiting.  MSK:as above        Objective      Past Medical History  Past Medical History:   Diagnosis Date   • Arthritis    • Kidney stone          Physical Exam  /82   Ht 180.3 cm (70.98\")   Wt 72.6 kg (160 lb)   BMI 22.33 kg/m²     Body mass index is 22.33 kg/m².    Patient is well nourished and well developed.        Ortho Exam  Right lower extremity exam: Patient has no joint line tenderness about the knee.  Normal range of motion.  Tender over the pes bursa and medial hamstring tendons.  He has 5/5 hamstring and quad strength.  No tenderness or swelling about the remainder of the right lower leg.  Normal gait.    Imaging/Labs/EMG Reviewed:  Imaging Results (Last 24 Hours)     Procedure Component Value Units Date/Time    XR Tibia Fibula 2 View Right [[id number]] Resulted: 06/15/22 1549     Updated: 06/15/22 1550    Narrative:      Right Tibia/Fibula Radiographs  Indication: pain  Views: AP and lateral views of the right tibia and fibula    Comparison: no prior studies available for review    Findings:  No acute bony abnormalities, with good alignment.  No unusual bony   features.            Assessment    Assessment:  1. Right leg pain    2. Pes anserinus bursitis of right knee        Plan:  1. Recommend over the counter anti-inflammatories for pain and/or swelling  2. Right pes bursitis.  I reviewed today's x-rays clinical " findings past and current treatment the patient.  I think his pain and function limitations are secondary to pes bursitis.  We discussed the bursitis at the hamstring tendon.  Sometimes it can cause some pain down the anterior tibia as well.  Recommendation today is cortisone injection into the pes bursa.  I explained how this can be used as both diagnostic and therapeutic.  He will return to see Dr. Lackey on his scheduled 712 appointment for his hips or return sooner if needed.  If his tibia pain persist, we may need to consider MRI for further evaluation.    Patient history, diagnosis and treatment plan discussed with Dr. Najera.    I discussed with the patient the potential benefits of performing a therapeutic injection of the right pes bursa as well as potential risks including but not limited to infection, swelling, pain, bleeding, bruising, nerve/vessel damage, skin color changes, transient elevation in blood glucose levels, and fat atrophy. After informed consent and verifying correct patient, procedure site, and type of procedure, the area was prepped with Hibiclens, ethyl chloride was used to numb the skin.  Using a 22-gauge needle, 1cc of 1% lidocaine and  40mg/ml of Kenalog were injected into the right pes bursa. The patient tolerated the procedure well. There were no complications.           Fabienne Robledo PA-C  06/16/22  11:58 EDT

## 2022-06-16 RX ORDER — LIDOCAINE HYDROCHLORIDE 10 MG/ML
1 INJECTION, SOLUTION EPIDURAL; INFILTRATION; INTRACAUDAL; PERINEURAL
Status: COMPLETED | OUTPATIENT
Start: 2022-06-15 | End: 2022-06-15

## 2022-06-16 RX ORDER — TRIAMCINOLONE ACETONIDE 40 MG/ML
40 INJECTION, SUSPENSION INTRA-ARTICULAR; INTRAMUSCULAR
Status: COMPLETED | OUTPATIENT
Start: 2022-06-15 | End: 2022-06-15

## 2022-06-27 DIAGNOSIS — S76.219A STRAIN OF ADDUCTOR MUSCLE OF THIGH: ICD-10-CM

## 2022-06-27 RX ORDER — METHOCARBAMOL 750 MG/1
TABLET, FILM COATED ORAL
Qty: 60 TABLET | Refills: 0 | Status: SHIPPED | OUTPATIENT
Start: 2022-06-27 | End: 2022-07-12 | Stop reason: SDUPTHER

## 2022-07-12 ENCOUNTER — OFFICE VISIT (OUTPATIENT)
Dept: ORTHOPEDIC SURGERY | Facility: CLINIC | Age: 72
End: 2022-07-12

## 2022-07-12 VITALS
BODY MASS INDEX: 22.4 KG/M2 | SYSTOLIC BLOOD PRESSURE: 120 MMHG | DIASTOLIC BLOOD PRESSURE: 74 MMHG | HEIGHT: 71 IN | WEIGHT: 160 LBS

## 2022-07-12 DIAGNOSIS — Z96.642 STATUS POST TOTAL REPLACEMENT OF LEFT HIP: ICD-10-CM

## 2022-07-12 DIAGNOSIS — Z96.649 S/P REVISION OF TOTAL HIP: ICD-10-CM

## 2022-07-12 DIAGNOSIS — Z96.641 HISTORY OF TOTAL RIGHT HIP REPLACEMENT: Primary | ICD-10-CM

## 2022-07-12 DIAGNOSIS — M79.18 GLUTEAL PAIN: ICD-10-CM

## 2022-07-12 DIAGNOSIS — S76.219A STRAIN OF ADDUCTOR MUSCLE OF THIGH: ICD-10-CM

## 2022-07-12 PROCEDURE — 99213 OFFICE O/P EST LOW 20 MIN: CPT | Performed by: ORTHOPAEDIC SURGERY

## 2022-07-12 RX ORDER — METHOCARBAMOL 750 MG/1
750 TABLET, FILM COATED ORAL 4 TIMES DAILY
Qty: 90 TABLET | Refills: 0 | Status: SHIPPED | OUTPATIENT
Start: 2022-07-12 | End: 2022-09-26

## 2022-07-12 RX ORDER — TRAMADOL HYDROCHLORIDE 50 MG/1
50 TABLET ORAL EVERY 6 HOURS PRN
Qty: 60 TABLET | Refills: 0 | Status: SHIPPED | OUTPATIENT
Start: 2022-07-12 | End: 2022-09-26

## 2022-07-12 NOTE — PROGRESS NOTES
Orthopaedic Clinic Note: Hip Established Patient    Chief Complaint   Patient presents with   • Follow-up     6 month f/u--History of total bilateral hip replacement         HPI    It has been 6  month(s) since Mr. Paz's last visit. He returns to clinic today for follow-up bilateral total hip arthroplasties.  He comes to clinic today complaining of minor discomfort localized to gluteal region of the right hip.  He is ambulating with no assistive device.  Denies fevers chills or constitutional symptoms.  Overall he is doing well.  He is requiring occasional Robaxin as well as tramadol.    Past Medical History:   Diagnosis Date   • Arthritis    • Kidney stone       Past Surgical History:   Procedure Laterality Date   • HIP ARTHROPLASTY     • HIP SURGERY Right     [facility]   • TOTAL HIP ARTHROPLASTY Left 12/11/2017    Procedure: TOTAL HIP ARTHROPLASTY LEFT;  Surgeon: Reed Lackey MD;  [facility];  Service:       Family History   Problem Relation Age of Onset   • Cancer Mother    • Cancer Father      Social History     Socioeconomic History   • Marital status:    Tobacco Use   • Smoking status: Former Smoker     Packs/day: 1.00     Years: 10.00     Pack years: 10.00     Types: Cigarettes   • Smokeless tobacco: Never Used   Vaping Use   • Vaping Use: Never used   Substance and Sexual Activity   • Alcohol use: Yes     Comment: occasional   • Drug use: No   • Sexual activity: Defer      Current Outpatient Medications on File Prior to Visit   Medication Sig Dispense Refill   • anastrozole (ARIMIDEX) 1 MG tablet TAKE ONE TABLET THREE TIMES PER WEEK     • celecoxib (CeleBREX) 100 MG capsule TAKE ONE CAPSULE BY MOUTH TWO TIMES A DAY 60 capsule 0   • cephalexin (KEFLEX) 500 MG capsule Take 500 mg by mouth 3 (Three) Times a Day.  1   • Diclofenac Sodium (VOLTAREN) 1 % gel gel APPLY AS NEEDED TO AFFECTED JOINT     • hydrOXYzine (ATARAX) 50 MG tablet Take 50 mg by mouth At Night As Needed for  "Itching.     • QUEtiapine (SEROquel) 25 MG tablet Take 25 mg by mouth Every Night.     • sertraline (ZOLOFT) 100 MG tablet Take 200 mg by mouth Daily.     • tamsulosin (FLOMAX) 0.4 MG capsule 24 hr capsule Take 1 capsule by mouth Daily.     • valACYclovir (VALTREX) 500 MG tablet Take 500 mg by mouth Daily.     • [DISCONTINUED] methocarbamol (ROBAXIN) 750 MG tablet TAKE ONE TABLET BY MOUTH FOUR TIMES A DAY 60 tablet 0   • [DISCONTINUED] traMADol (ULTRAM) 50 MG tablet TAKE ONE TABLET BY MOUTH EVERY 6 HOURS AS NEEDED FOR MODERATE PAIN 60 tablet 0     No current facility-administered medications on file prior to visit.      No Known Allergies     Review of Systems   Constitutional: Negative.    HENT: Negative.    Eyes: Negative.    Respiratory: Negative.    Cardiovascular: Negative.    Gastrointestinal: Negative.    Endocrine: Negative.    Genitourinary: Negative.    Musculoskeletal: Positive for arthralgias.   Skin: Negative.    Allergic/Immunologic: Negative.    Neurological: Negative.    Hematological: Negative.    Psychiatric/Behavioral: Negative.         The patient's Review of Systems was personally reviewed and confirmed as accurate.    Physical Exam  Blood pressure 120/74, height 180.3 cm (70.98\"), weight 72.6 kg (160 lb).    Body mass index is 22.33 kg/m².    GENERAL APPEARANCE: awake, alert, oriented, in no acute distress and well developed, well nourished  LUNGS:  breathing nonlabored  EXTREMITIES: no clubbing, cyanosis  PERIPHERAL PULSES: palpable dorsalis pedis and posterior tibial pulses bilaterally.    GAIT:  Normal            Hip Exam:  Bilateral    RANGE OF MOTION:  EXTENSION/FLEXION:  normal (0-110 degrees)  IR (at 90 degrees of flexion):  20  ER (at 90 degrees of flexion):  40  PAIN WITH HIP MOTION:  no  PAIN WITH LOGROLL:  no     STINCHFIELD TEST: negative    STRENGTH:  ABDUCTOR:  5/5  ADDUCTOR:  5/5  HIP FLEXION:  5/5    GREATER TROCHANTER BURSAL PAIN:  yes    SENSATION TO LIGHT TOUCH:  DEEP " PERONEAL/SUPERFICIAL PERONEAL/SURAL/SAPHENOUS/TIBIAL:   intact    EDEMA:  no  ERYTHEMA:  no  WOUNDS/INCISIONS:   no  _________________________________________________________________  _________________________________________________________________    RADIOGRAPHIC FINDINGS:   Indication: Status post bilateral total hip arthroplasty    Comparison: Todays xrays were compared to previous xrays from 1/11/2022    AP pelvis: Right: Demonstrate a well positioned revision total hip without evidence of wear, loosening, fracture or osteolysis, femoral head is concentrically reduced within the acetabulum and No significant changes compared to prior radiographs.;Left: Demonstrate a well positioned total hip without evidence of wear, loosening, fracture or osteolysis, femoral head is concentrically reduced within the acetabulum and No significant changes compared to prior radiographs.      Assessment/Plan:   Diagnosis Plan   1. History of total right hip replacement  XR Pelvis 1 or 2 View    traMADol (ULTRAM) 50 MG tablet   2. Strain of adductor muscle of thigh  methocarbamol (ROBAXIN) 750 MG tablet   3. S/P revision of total hip  traMADol (ULTRAM) 50 MG tablet   4. Status post total replacement of left hip     5. Gluteal pain       Patient is doing well status post bilateral total hip arthroplasties.  He continues to have intermittent abductor and gluteal pain.  We will refill his Robaxin as well as his tramadol prescriptions.  Follow-up in 6 months for repeat assessment.    Reed Lackey MD  07/12/22  07:56 EDT

## 2022-09-26 DIAGNOSIS — S76.219A STRAIN OF ADDUCTOR MUSCLE OF THIGH: ICD-10-CM

## 2022-09-26 DIAGNOSIS — Z96.641 HISTORY OF TOTAL RIGHT HIP REPLACEMENT: ICD-10-CM

## 2022-09-26 DIAGNOSIS — Z96.649 S/P REVISION OF TOTAL HIP: ICD-10-CM

## 2022-09-26 RX ORDER — METHOCARBAMOL 750 MG/1
TABLET, FILM COATED ORAL
Qty: 90 TABLET | Refills: 0 | Status: SHIPPED | OUTPATIENT
Start: 2022-09-26 | End: 2022-10-24

## 2022-09-26 RX ORDER — TRAMADOL HYDROCHLORIDE 50 MG/1
TABLET ORAL
Qty: 60 TABLET | Refills: 0 | Status: SHIPPED | OUTPATIENT
Start: 2022-09-26 | End: 2022-10-24

## 2022-10-24 DIAGNOSIS — S76.219A STRAIN OF ADDUCTOR MUSCLE OF THIGH: ICD-10-CM

## 2022-10-24 DIAGNOSIS — Z96.641 HISTORY OF TOTAL RIGHT HIP REPLACEMENT: ICD-10-CM

## 2022-10-24 DIAGNOSIS — Z96.649 S/P REVISION OF TOTAL HIP: ICD-10-CM

## 2022-10-24 RX ORDER — TRAMADOL HYDROCHLORIDE 50 MG/1
TABLET ORAL
Qty: 60 TABLET | Refills: 0 | Status: SHIPPED | OUTPATIENT
Start: 2022-10-24 | End: 2022-12-13

## 2022-10-24 RX ORDER — METHOCARBAMOL 750 MG/1
TABLET, FILM COATED ORAL
Qty: 90 TABLET | Refills: 0 | Status: SHIPPED | OUTPATIENT
Start: 2022-10-24 | End: 2022-12-13

## 2022-12-07 ENCOUNTER — OFFICE VISIT (OUTPATIENT)
Dept: ORTHOPEDIC SURGERY | Facility: CLINIC | Age: 72
End: 2022-12-07

## 2022-12-07 VITALS
BODY MASS INDEX: 22.73 KG/M2 | WEIGHT: 162.4 LBS | DIASTOLIC BLOOD PRESSURE: 68 MMHG | SYSTOLIC BLOOD PRESSURE: 112 MMHG | HEIGHT: 71 IN

## 2022-12-07 DIAGNOSIS — M79.18 GLUTEAL PAIN: ICD-10-CM

## 2022-12-07 DIAGNOSIS — M70.51 PES ANSERINUS BURSITIS OF RIGHT KNEE: ICD-10-CM

## 2022-12-07 DIAGNOSIS — S76.219A STRAIN OF ADDUCTOR MUSCLE OF THIGH: Primary | ICD-10-CM

## 2022-12-07 PROCEDURE — 20610 DRAIN/INJ JOINT/BURSA W/O US: CPT | Performed by: PHYSICIAN ASSISTANT

## 2022-12-07 PROCEDURE — 99214 OFFICE O/P EST MOD 30 MIN: CPT | Performed by: PHYSICIAN ASSISTANT

## 2022-12-07 RX ADMIN — TRIAMCINOLONE ACETONIDE 40 MG: 40 INJECTION, SUSPENSION INTRA-ARTICULAR; INTRAMUSCULAR at 14:16

## 2022-12-07 RX ADMIN — LIDOCAINE HYDROCHLORIDE 1 ML: 10 INJECTION, SOLUTION EPIDURAL; INFILTRATION; INTRACAUDAL; PERINEURAL at 14:16

## 2022-12-07 NOTE — PROGRESS NOTES
"        [facility] – [place] [place] Orth[facility] Surgery Clinic Note        Subjective     CC: Follow-up (6 month follow up - Pes anserinus bursitis of right knee (cortisone injection 6/15/22))      KORY Paz IV is a 72 y.o. male.  Patient presents today with increasing pes pain as well as persisting adductor pain.  He reports the adductor pain doesn't bother him with speed walking, but bothers him with light jogging.  He had resolved pes bursa pain with injection in the past.  He has not done any formal PT for either problem.  He is s/p B SALVADOR and is doing well with no pain.    Overall, patient's symptoms are worsening.    ROS:    Constiutional:Pt denies fever, chills, nausea, or vomiting.  MSK:as above        Objective      Past Medical History  Past Medical History:   Diagnosis Date   • Arthritis    • Kidney stone          Physical Exam  /68   Ht 180.3 cm (70.98\")   Wt 73.7 kg (162 lb 6.4 oz)   BMI 22.66 kg/m²     Body mass index is 22.66 kg/m².    Patient is well nourished and well developed.        Ortho Exam  Right lower extremity exam: No pain with hip motion.  Preserved hip motion.  5/5 hip flexors adductor's abductor's with good strength.  Mildly tender over the adductor's.  Patient has tenderness at the pes bursa.  No joint line tenderness.  Range of motion 0-1 30.  Very tight hamstrings.  Neurovascular intact distally.    Imaging/Labs/EMG Reviewed:  Imaging Results (Last 24 Hours)     ** No results found for the last 24 hours. **            Assessment    Assessment:  1. Strain of adductor muscle of thigh    2. Gluteal pain    3. Pes anserinus bursitis of right knee        Plan:  1. Recommend over the counter anti-inflammatories for pain and/or swelling  2. Right thigh adductor strain with gluteal pain and pes bursitis.  I reviewed today's clinical findings past and current treatment the patient.  Patient had resolved pain with bursal injection in the past.  He continues to have some adductor pain with light " jogging.  I did tell him he should not be jogging with hip replacements.  Patient also has very tight hamstrings.  We discussed further treatment with a course of physical therapy for both the pes and hamstrings as well as the abductors.  He is amenable to this.  I given him prescription.  We also plan for repeat right pes bursa injection.  He will return to see me as needed.    I discussed with the patient the potential benefits of performing a therapeutic injection of the right pes bursa as well as potential risks including but not limited to infection, swelling, pain, bleeding, bruising, nerve/vessel damage, skin color changes, transient elevation in blood glucose levels, and fat atrophy. After informed consent and verifying correct patient, procedure site, and type of procedure, the area was prepped with Hibiclens, ethyl chloride was used to numb the skin.  Using a 22-gauge needle, 1cc of 1% lidocaine and  40mg/ml of Kenalog were injected into the right pes bursa. The patient tolerated the procedure well. There were no complications.           Fabienne Robledo PA-C  12/08/22  10:49 EST

## 2022-12-07 NOTE — PROGRESS NOTES
Procedure   Large Joint Arthrocentesis  Date/Time: 12/7/2022 2:16 PM  Consent given by: patient  Site marked: site marked  Timeout: Immediately prior to procedure a time out was called to verify the correct patient, procedure, equipment, support staff and site/side marked as required   Supporting Documentation  Indications: pain   Procedure Details  Location: knee (Right Knee Pes Bursa) -   Preparation: Patient was prepped and draped in the usual sterile fashion  Needle size: 23 G  Approach: anterior  Medications administered: 1 mL lidocaine PF 1% 1 %; 40 mg triamcinolone acetonide 40 MG/ML  Patient tolerance: patient tolerated the procedure well with no immediate complications

## 2022-12-08 RX ORDER — LIDOCAINE HYDROCHLORIDE 10 MG/ML
1 INJECTION, SOLUTION EPIDURAL; INFILTRATION; INTRACAUDAL; PERINEURAL
Status: COMPLETED | OUTPATIENT
Start: 2022-12-07 | End: 2022-12-07

## 2022-12-08 RX ORDER — TRIAMCINOLONE ACETONIDE 40 MG/ML
40 INJECTION, SUSPENSION INTRA-ARTICULAR; INTRAMUSCULAR
Status: COMPLETED | OUTPATIENT
Start: 2022-12-07 | End: 2022-12-07

## 2022-12-13 DIAGNOSIS — S76.219A STRAIN OF ADDUCTOR MUSCLE OF THIGH: ICD-10-CM

## 2022-12-13 DIAGNOSIS — Z96.641 HISTORY OF TOTAL RIGHT HIP REPLACEMENT: ICD-10-CM

## 2022-12-13 DIAGNOSIS — Z96.649 S/P REVISION OF TOTAL HIP: ICD-10-CM

## 2022-12-13 RX ORDER — TRAMADOL HYDROCHLORIDE 50 MG/1
TABLET ORAL
Qty: 60 TABLET | Refills: 0 | Status: SHIPPED | OUTPATIENT
Start: 2022-12-13 | End: 2023-02-06

## 2022-12-13 RX ORDER — METHOCARBAMOL 750 MG/1
TABLET, FILM COATED ORAL
Qty: 90 TABLET | Refills: 0 | Status: SHIPPED | OUTPATIENT
Start: 2022-12-13

## 2023-01-30 DIAGNOSIS — S76.219A STRAIN OF ADDUCTOR MUSCLE OF THIGH: ICD-10-CM

## 2023-01-31 RX ORDER — METHOCARBAMOL 750 MG/1
TABLET, FILM COATED ORAL
Qty: 90 TABLET | Refills: 0 | OUTPATIENT
Start: 2023-01-31

## 2023-02-02 ENCOUNTER — OFFICE VISIT (OUTPATIENT)
Dept: ORTHOPEDIC SURGERY | Facility: CLINIC | Age: 73
End: 2023-02-02
Payer: MEDICARE

## 2023-02-02 VITALS
BODY MASS INDEX: 22.54 KG/M2 | HEIGHT: 71 IN | DIASTOLIC BLOOD PRESSURE: 68 MMHG | WEIGHT: 161 LBS | SYSTOLIC BLOOD PRESSURE: 110 MMHG

## 2023-02-02 DIAGNOSIS — Z96.642 STATUS POST TOTAL REPLACEMENT OF LEFT HIP: ICD-10-CM

## 2023-02-02 DIAGNOSIS — M70.72 ISCHIAL BURSITIS OF LEFT SIDE: ICD-10-CM

## 2023-02-02 DIAGNOSIS — Z96.641 HISTORY OF TOTAL RIGHT HIP REPLACEMENT: Primary | ICD-10-CM

## 2023-02-02 PROCEDURE — 20610 DRAIN/INJ JOINT/BURSA W/O US: CPT | Performed by: ORTHOPAEDIC SURGERY

## 2023-02-02 PROCEDURE — 99214 OFFICE O/P EST MOD 30 MIN: CPT | Performed by: ORTHOPAEDIC SURGERY

## 2023-02-02 RX ORDER — BUPIVACAINE HYDROCHLORIDE 2.5 MG/ML
3 INJECTION, SOLUTION EPIDURAL; INFILTRATION; INTRACAUDAL
Status: COMPLETED | OUTPATIENT
Start: 2023-02-02 | End: 2023-02-02

## 2023-02-02 RX ORDER — LIDOCAINE HYDROCHLORIDE 10 MG/ML
3 INJECTION, SOLUTION EPIDURAL; INFILTRATION; INTRACAUDAL; PERINEURAL
Status: COMPLETED | OUTPATIENT
Start: 2023-02-02 | End: 2023-02-02

## 2023-02-02 RX ORDER — TRIAMCINOLONE ACETONIDE 40 MG/ML
80 INJECTION, SUSPENSION INTRA-ARTICULAR; INTRAMUSCULAR
Status: COMPLETED | OUTPATIENT
Start: 2023-02-02 | End: 2023-02-02

## 2023-02-02 RX ADMIN — TRIAMCINOLONE ACETONIDE 80 MG: 40 INJECTION, SUSPENSION INTRA-ARTICULAR; INTRAMUSCULAR at 14:15

## 2023-02-02 RX ADMIN — LIDOCAINE HYDROCHLORIDE 3 ML: 10 INJECTION, SOLUTION EPIDURAL; INFILTRATION; INTRACAUDAL; PERINEURAL at 14:15

## 2023-02-02 RX ADMIN — BUPIVACAINE HYDROCHLORIDE 3 ML: 2.5 INJECTION, SOLUTION EPIDURAL; INFILTRATION; INTRACAUDAL at 14:15

## 2023-02-02 NOTE — PROGRESS NOTES
Orthopaedic Clinic Note: Hip Established Patient    Chief Complaint   Patient presents with   • Follow-up     6 month f/u--History of total bilateral hip replacement         HPI    It has been 6  month(s) since Mr. Paz's last visit. He returns to clinic today for follow-up bilateral total hip arthroplasties.  Overall he is doing well but is continue to have pain that he localizes into the left ischium area.  He has undergone treatment with physical therapy and stretching but it continues to be refractory to these interventions.  He is having difficulty with sitting due to this.  Denies any groin pain.   He is requesting intervention for his ischial pain.    Past Medical History:   Diagnosis Date   • Arthritis    • Kidney stone       Past Surgical History:   Procedure Laterality Date   • HIP ARTHROPLASTY     • HIP SURGERY Right     [facility]   • TOTAL HIP ARTHROPLASTY Left 12/11/2017    Procedure: TOTAL HIP ARTHROPLASTY LEFT;  Surgeon: Reed Lackey MD;  Location: [facility];  Service:       Family History   Problem Relation Age of Onset   • Cancer Mother    • Cancer Father      Social History     Socioeconomic History   • Marital status:    Tobacco Use   • Smoking status: Former     Packs/day: 1.00     Years: 10.00     Pack years: 10.00     Types: Cigarettes   • Smokeless tobacco: Never   Vaping Use   • Vaping Use: Never used   Substance and Sexual Activity   • Alcohol use: Yes     Comment: occasional   • Drug use: No   • Sexual activity: Defer      Current Outpatient Medications on File Prior to Visit   Medication Sig Dispense Refill   • anastrozole (ARIMIDEX) 1 MG tablet TAKE ONE TABLET THREE TIMES PER WEEK     • celecoxib (CeleBREX) 100 MG capsule TAKE ONE CAPSULE BY MOUTH TWO TIMES A DAY 60 capsule 0   • cephalexin (KEFLEX) 500 MG capsule Take 500 mg by mouth 3 (Three) Times a Day.  1   • Diclofenac Sodium (VOLTAREN) 1 % gel gel APPLY AS NEEDED TO AFFECTED JOINT     • hydrOXYzine (ATARAX) 50  "MG tablet Take 50 mg by mouth At Night As Needed for Itching.     • methocarbamol (ROBAXIN) 750 MG tablet TAKE 1 TABLET BY MOUTH FOUR TIMES DAILY 90 tablet 0   • QUEtiapine (SEROquel) 25 MG tablet Take 25 mg by mouth Every Night.     • sertraline (ZOLOFT) 100 MG tablet Take 200 mg by mouth Daily.     • tamsulosin (FLOMAX) 0.4 MG capsule 24 hr capsule Take 1 capsule by mouth Daily.     • traMADol (ULTRAM) 50 MG tablet TAKE 1 TABLET BY MOUTH EVERY SIX (6) HOURS IF NEEDED FOR MODERATE PAIN 60 tablet 0   • valACYclovir (VALTREX) 500 MG tablet Take 500 mg by mouth Daily.       No current facility-administered medications on file prior to visit.      No Known Allergies     Review of Systems   Constitutional: Negative.    HENT: Negative.    Eyes: Negative.    Respiratory: Negative.    Cardiovascular: Negative.    Gastrointestinal: Negative.    Endocrine: Negative.    Genitourinary: Negative.    Musculoskeletal: Positive for arthralgias.   Skin: Negative.    Allergic/Immunologic: Negative.    Neurological: Negative.    Hematological: Negative.    Psychiatric/Behavioral: Negative.         The patient's Review of Systems was personally reviewed and confirmed as accurate.    Physical Exam  Blood pressure 110/68, height 180.3 cm (70.98\"), weight 73 kg (161 lb).    Body mass index is 22.47 kg/m².    GENERAL APPEARANCE: awake, alert, oriented, in no acute distress and well developed, well nourished  LUNGS:  breathing nonlabored  EXTREMITIES: no clubbing, cyanosis  PERIPHERAL PULSES: palpable dorsalis pedis and posterior tibial pulses bilaterally.    GAIT:  Antalgic            Hip Exam:  Bilateral     RANGE OF MOTION:  EXTENSION/FLEXION:  normal (0-110 degrees)  IR (at 90 degrees of flexion):  20  ER (at 90 degrees of flexion):  40  PAIN WITH HIP MOTION:  no  PAIN WITH LOGROLL:  no      STINCHFIELD TEST: negative     STRENGTH:  ABDUCTOR:    5/5  ADDUCTOR:  5/5  HIP FLEXION:  5/5     GREATER TROCHANTER BURSAL PAIN:  yes  Tender to " palpation about the left ischial bursa    SENSATION TO LIGHT TOUCH:  DEEP PERONEAL/SUPERFICIAL PERONEAL/SURAL/SAPHENOUS/TIBIAL:   intact    EDEMA:  no  ERYTHEMA:  no  WOUNDS/INCISIONS:   no  _________________________________________________________________  _________________________________________________________________    RADIOGRAPHIC FINDINGS:   Indication: Status post bilateral total hip arthroplasty    Comparison: Todays xrays were compared to previous xrays from 7/12/2022    AP pelvis: Right: Demonstrate a well positioned revision total hip without evidence of wear, loosening, fracture or osteolysis, femoral head is concentrically reduced within the acetabulum and No significant changes compared to prior radiographs.;Left: Demonstrate a well positioned total hip without evidence of wear, loosening, fracture or osteolysis, femoral head is concentrically reduced within the acetabulum and No significant changes compared to prior radiographs.      Assessment/Plan:   Diagnosis Plan   1. History of total right hip replacement  XR Pelvis 1 or 2 View      2. Status post total replacement of left hip  XR Pelvis 1 or 2 View      3. Ischial bursitis of left side          Patient suffering from ischial bursitis.  I discussed treatment options for his worsening ischial pain.  He is agreeable to cortisone injection ischial bursa today.  Otherwise his total hip arthroplasties are functioning well.  He will follow-up in 6 months for repeat evaluation.    Procedure Note:  I discussed with the patient the potential benefits of performing a therapeutic injection of the left ischial bursa as well as potential risks including but not limited to infection, swelling, pain, bleeding, bruising, nerve/vessel damage, skin color changes, transient elevation in blood glucose levels, and fat atrophy. After informed consent and verifying correct patient, procedure site, and type of procedure, the area was prepped with alcohol, ethyl  chloride was used to numb the skin. Via the posterior approach, 3 cc of 1% lidocaine, 3 cc of 0.25% Marcaine and 2 cc of 40mg/ml of Kenalog were injected into the left ischial bursa. The patient tolerated the procedure well. There were no complications. A sterile dressing was placed over the injection site.      Reed Lackey MD  02/02/23  14:14 EST

## 2023-02-02 NOTE — PROGRESS NOTES
Procedure   Large Joint Arthrocentesis  Date/Time: 2/2/2023 2:15 PM  Consent given by: patient  Site marked: site marked  Timeout: Immediately prior to procedure a time out was called to verify the correct patient, procedure, equipment, support staff and site/side marked as required   Supporting Documentation  Indications: pain   Procedure Details  Location: Right Ischial tuberosity.  Preparation: Patient was prepped and draped in the usual sterile fashion  Needle size: 23 G  Approach: posterior  Medications administered: 3 mL lidocaine PF 1% 1 %; 80 mg triamcinolone acetonide 40 MG/ML; 3 mL bupivacaine (PF) 0.25 %  Patient tolerance: patient tolerated the procedure well with no immediate complications

## 2023-02-03 DIAGNOSIS — Z96.649 S/P REVISION OF TOTAL HIP: ICD-10-CM

## 2023-02-03 DIAGNOSIS — Z96.641 HISTORY OF TOTAL RIGHT HIP REPLACEMENT: ICD-10-CM

## 2023-02-06 RX ORDER — TRAMADOL HYDROCHLORIDE 50 MG/1
TABLET ORAL
Qty: 60 TABLET | Refills: 0 | Status: SHIPPED | OUTPATIENT
Start: 2023-02-06 | End: 2023-04-03

## 2023-04-01 DIAGNOSIS — Z96.641 HISTORY OF TOTAL RIGHT HIP REPLACEMENT: ICD-10-CM

## 2023-04-01 DIAGNOSIS — Z96.649 S/P REVISION OF TOTAL HIP: ICD-10-CM

## 2023-04-03 RX ORDER — TRAMADOL HYDROCHLORIDE 50 MG/1
TABLET ORAL
Qty: 60 TABLET | Refills: 0 | Status: SHIPPED | OUTPATIENT
Start: 2023-04-03

## 2023-04-07 ENCOUNTER — TRANSCRIBE ORDERS (OUTPATIENT)
Dept: ADMINISTRATIVE | Facility: HOSPITAL | Age: 73
End: 2023-04-07
Payer: MEDICARE

## 2023-04-07 DIAGNOSIS — Z11.4 ENCOUNTER FOR SCREENING FOR HUMAN IMMUNODEFICIENCY VIRUS (HIV): Primary | ICD-10-CM

## 2023-04-10 ENCOUNTER — TRANSCRIBE ORDERS (OUTPATIENT)
Dept: ADMINISTRATIVE | Facility: HOSPITAL | Age: 73
End: 2023-04-10
Payer: MEDICARE

## 2023-04-10 DIAGNOSIS — Z87.891 PERSONAL HISTORY OF TOBACCO USE, PRESENTING HAZARDS TO HEALTH: Primary | ICD-10-CM

## 2023-05-19 DIAGNOSIS — Z96.649 S/P REVISION OF TOTAL HIP: ICD-10-CM

## 2023-05-19 DIAGNOSIS — Z96.641 HISTORY OF TOTAL RIGHT HIP REPLACEMENT: ICD-10-CM

## 2023-05-19 RX ORDER — TRAMADOL HYDROCHLORIDE 50 MG/1
TABLET ORAL
Qty: 60 TABLET | Refills: 0 | Status: SHIPPED | OUTPATIENT
Start: 2023-05-19

## 2023-08-07 DIAGNOSIS — Z96.649 S/P REVISION OF TOTAL HIP: ICD-10-CM

## 2023-08-07 DIAGNOSIS — Z96.641 HISTORY OF TOTAL RIGHT HIP REPLACEMENT: ICD-10-CM

## 2023-08-07 RX ORDER — TRAMADOL HYDROCHLORIDE 50 MG/1
TABLET ORAL
Qty: 60 TABLET | Refills: 0 | Status: SHIPPED | OUTPATIENT
Start: 2023-08-07

## 2023-09-25 DIAGNOSIS — Z96.649 S/P REVISION OF TOTAL HIP: ICD-10-CM

## 2023-09-25 DIAGNOSIS — Z96.641 HISTORY OF TOTAL RIGHT HIP REPLACEMENT: ICD-10-CM

## 2023-09-25 RX ORDER — TRAMADOL HYDROCHLORIDE 50 MG/1
TABLET ORAL
Qty: 60 TABLET | Refills: 0 | Status: SHIPPED | OUTPATIENT
Start: 2023-09-25

## 2023-11-07 DIAGNOSIS — Z96.649 S/P REVISION OF TOTAL HIP: ICD-10-CM

## 2023-11-07 DIAGNOSIS — Z96.641 HISTORY OF TOTAL RIGHT HIP REPLACEMENT: ICD-10-CM

## 2023-11-07 RX ORDER — TRAMADOL HYDROCHLORIDE 50 MG/1
50 TABLET ORAL EVERY 6 HOURS PRN
Qty: 60 TABLET | Refills: 0 | Status: SHIPPED | OUTPATIENT
Start: 2023-11-07

## 2023-12-27 DIAGNOSIS — Z96.641 HISTORY OF TOTAL RIGHT HIP REPLACEMENT: ICD-10-CM

## 2023-12-27 DIAGNOSIS — Z96.649 S/P REVISION OF TOTAL HIP: ICD-10-CM

## 2023-12-28 RX ORDER — TRAMADOL HYDROCHLORIDE 50 MG/1
50 TABLET ORAL EVERY 6 HOURS PRN
Qty: 60 TABLET | Refills: 0 | Status: SHIPPED | OUTPATIENT
Start: 2023-12-28

## 2024-02-02 ENCOUNTER — OFFICE VISIT (OUTPATIENT)
Dept: ORTHOPEDIC SURGERY | Facility: CLINIC | Age: 74
End: 2024-02-02
Payer: MEDICARE

## 2024-02-02 VITALS
BODY MASS INDEX: 24.14 KG/M2 | DIASTOLIC BLOOD PRESSURE: 70 MMHG | SYSTOLIC BLOOD PRESSURE: 120 MMHG | WEIGHT: 163 LBS | HEIGHT: 69 IN

## 2024-02-02 DIAGNOSIS — Z96.643 HISTORY OF TOTAL HIP REPLACEMENT, BILATERAL: Primary | ICD-10-CM

## 2024-02-02 DIAGNOSIS — Z96.641 HISTORY OF TOTAL RIGHT HIP REPLACEMENT: ICD-10-CM

## 2024-02-02 DIAGNOSIS — Z96.649 S/P REVISION OF TOTAL HIP: ICD-10-CM

## 2024-02-02 RX ORDER — TRAMADOL HYDROCHLORIDE 50 MG/1
50 TABLET ORAL EVERY 6 HOURS PRN
Qty: 60 TABLET | Refills: 0 | Status: SHIPPED | OUTPATIENT
Start: 2024-02-02

## 2024-02-02 NOTE — PROGRESS NOTES
Orthopaedic Clinic Note: Hip Established Patient    Chief Complaint   Patient presents with    Follow-up     1 year f/u-- History of total bilateral hip replacement         HPI    It has been 1  year(s) since Mr. Paz's last visit. He returns to clinic today for follow-up bilateral total hip arthroplasties.  Rates pain 1/10 on the pain scale.  Is ambulating with no assistive device.  Denies fevers chills constitutional symptoms.  Overall he is happy with his current level of function.    Past Medical History:   Diagnosis Date    Arthritis     Kidney stone       Past Surgical History:   Procedure Laterality Date    HIP ARTHROPLASTY      HIP SURGERY Right     [facility]    TOTAL HIP ARTHROPLASTY Left 12/11/2017    Procedure: TOTAL HIP ARTHROPLASTY LEFT;  Surgeon: Reed Lackey MD;  Location: [facility];  Service:       Family History   Problem Relation Age of Onset    Cancer Mother     Cancer Father      Social History     Socioeconomic History    Marital status:    Tobacco Use    Smoking status: Former     Packs/day: 1.00     Years: 10.00     Additional pack years: 0.00     Total pack years: 10.00     Types: Cigarettes    Smokeless tobacco: Never   Vaping Use    Vaping Use: Never used   Substance and Sexual Activity    Alcohol use: Yes     Comment: occasional    Drug use: No    Sexual activity: Defer      Current Outpatient Medications on File Prior to Visit   Medication Sig Dispense Refill    anastrozole (ARIMIDEX) 1 MG tablet TAKE ONE TABLET THREE TIMES PER WEEK      celecoxib (CeleBREX) 100 MG capsule TAKE ONE CAPSULE BY MOUTH TWO TIMES A DAY 60 capsule 0    cephalexin (KEFLEX) 500 MG capsule Take 500 mg by mouth 3 (Three) Times a Day.  1    Diclofenac Sodium (VOLTAREN) 1 % gel gel APPLY AS NEEDED TO AFFECTED JOINT      hydrOXYzine (ATARAX) 50 MG tablet Take 50 mg by mouth At Night As Needed for Itching.      methocarbamol (ROBAXIN) 750 MG tablet TAKE 1 TABLET BY MOUTH FOUR TIMES DAILY 90 tablet  "0    QUEtiapine (SEROquel) 25 MG tablet Take 25 mg by mouth Every Night.      sertraline (ZOLOFT) 100 MG tablet Take 200 mg by mouth Daily.      tamsulosin (FLOMAX) 0.4 MG capsule 24 hr capsule Take 1 capsule by mouth Daily.      valACYclovir (VALTREX) 500 MG tablet Take 500 mg by mouth Daily.      [DISCONTINUED] traMADol (ULTRAM) 50 MG tablet Take 1 tablet by mouth Every 6 (Six) Hours As Needed for Severe Pain. 60 tablet 0     No current facility-administered medications on file prior to visit.      No Known Allergies     Review of Systems   Constitutional: Negative.    HENT: Negative.     Eyes: Negative.    Respiratory: Negative.     Cardiovascular: Negative.    Gastrointestinal: Negative.    Endocrine: Negative.    Genitourinary: Negative.    Musculoskeletal:  Positive for arthralgias.   Skin: Negative.    Allergic/Immunologic: Negative.    Neurological: Negative.    Hematological: Negative.    Psychiatric/Behavioral: Negative.          The patient's Review of Systems was personally reviewed and confirmed as accurate.    Physical Exam  Blood pressure 120/70, height 175.3 cm (69\"), weight 73.9 kg (163 lb).    Body mass index is 24.07 kg/m².    GENERAL APPEARANCE: awake, alert, oriented, in no acute distress and well developed, well nourished  LUNGS:  breathing nonlabored  EXTREMITIES: no clubbing, cyanosis  PERIPHERAL PULSES: palpable dorsalis pedis and posterior tibial pulses bilaterally.    GAIT:  Normal            Hip Exam:  Bilateral     RANGE OF MOTION:  EXTENSION/FLEXION:  normal (0-110 degrees)  IR (at 90 degrees of flexion):  20  ER (at 90 degrees of flexion):  40  PAIN WITH HIP MOTION:  no  PAIN WITH LOGROLL:  no      STINCHFIELD TEST: negative     STRENGTH:  ABDUCTOR:    5/5  ADDUCTOR:  5/5  HIP FLEXION:  5/5     GREATER TROCHANTER BURSAL PAIN:  yes  Tender to palpation about the left ischial bursa    SENSATION TO LIGHT TOUCH:  DEEP PERONEAL/SUPERFICIAL PERONEAL/SURAL/SAPHENOUS/TIBIAL:   intact    EDEMA: "  no  ERYTHEMA:  no  WOUNDS/INCISIONS:   no  _________________________________________________________________  _________________________________________________________________    RADIOGRAPHIC FINDINGS:   Indication: Status post bilateral total hip arthroplasty    Comparison: Todays xrays were compared to previous xrays from 2/2/2023    AP pelvis: Right: Demonstrate a well positioned revision total hip without evidence of wear, loosening, fracture or osteolysis, femoral head is concentrically reduced within the acetabulum and No significant changes compared to prior radiographs.;Left: Demonstrate a well positioned total hip without evidence of wear, loosening, fracture or osteolysis, femoral head is concentrically reduced within the acetabulum and No significant changes compared to prior radiographs.      Assessment/Plan:   Diagnosis Plan   1. History of total hip replacement, bilateral  XR Pelvis 1 or 2 View      2. History of total right hip replacement  traMADol (ULTRAM) 50 MG tablet      3. S/P revision of total hip  traMADol (ULTRAM) 50 MG tablet        Patient is doing well status post bilateral total hip arthroplasties.  Recommend continued activity as tolerated without restrictions.  I will refill his tramadol prescription.  I encouraged him to take this sparingly.  He will follow-up with me in 1 year for repeat assessment.    Reed Lackey MD  02/02/24  07:50 EST

## 2024-02-29 ENCOUNTER — TELEPHONE (OUTPATIENT)
Dept: ORTHOPEDIC SURGERY | Facility: CLINIC | Age: 74
End: 2024-02-29
Payer: MEDICARE

## 2024-02-29 DIAGNOSIS — Z96.643 HISTORY OF TOTAL HIP REPLACEMENT, BILATERAL: Primary | ICD-10-CM

## 2024-02-29 NOTE — TELEPHONE ENCOUNTER
Faxed PT order to [facility] in [place]. Contacted [facility] and confirmed that they have received the referral.

## 2024-02-29 NOTE — TELEPHONE ENCOUNTER
LORENA NEWMAN IS CALLING REQUESTING A PT ORDER FOR THE PATIENT. PATIENT WAS TRYING TO SCHEDULE AN APPOINTMENT. STATED AT HIS LAST APPT DR. GARCIA SUGGESTED PHYSICAL THERAPY.   CAN SOMEONE PLEASE PLACE AN ORDER.       LORENA NEWMAN #:  [phone]  FAX #: [phone]

## 2024-04-17 DIAGNOSIS — Z96.649 S/P REVISION OF TOTAL HIP: ICD-10-CM

## 2024-04-17 DIAGNOSIS — Z96.641 HISTORY OF TOTAL RIGHT HIP REPLACEMENT: ICD-10-CM

## 2024-04-18 RX ORDER — TRAMADOL HYDROCHLORIDE 50 MG/1
50 TABLET ORAL EVERY 8 HOURS PRN
Qty: 60 TABLET | Refills: 0 | Status: SHIPPED | OUTPATIENT
Start: 2024-04-18

## 2024-05-24 DIAGNOSIS — Z96.641 HISTORY OF TOTAL RIGHT HIP REPLACEMENT: ICD-10-CM

## 2024-05-24 DIAGNOSIS — Z96.649 S/P REVISION OF TOTAL HIP: ICD-10-CM

## 2024-05-24 RX ORDER — TRAMADOL HYDROCHLORIDE 50 MG/1
50 TABLET ORAL EVERY 8 HOURS PRN
Qty: 60 TABLET | Refills: 0 | Status: SHIPPED | OUTPATIENT
Start: 2024-05-24

## 2024-07-02 DIAGNOSIS — Z96.641 HISTORY OF TOTAL RIGHT HIP REPLACEMENT: ICD-10-CM

## 2024-07-02 DIAGNOSIS — Z96.649 S/P REVISION OF TOTAL HIP: ICD-10-CM

## 2024-07-02 RX ORDER — TRAMADOL HYDROCHLORIDE 50 MG/1
50 TABLET ORAL EVERY 8 HOURS PRN
Qty: 60 TABLET | Refills: 0 | Status: SHIPPED | OUTPATIENT
Start: 2024-07-02

## 2024-08-02 DIAGNOSIS — Z96.649 S/P REVISION OF TOTAL HIP: ICD-10-CM

## 2024-08-02 DIAGNOSIS — Z96.641 HISTORY OF TOTAL RIGHT HIP REPLACEMENT: ICD-10-CM

## 2024-08-05 RX ORDER — TRAMADOL HYDROCHLORIDE 50 MG/1
50 TABLET ORAL EVERY 8 HOURS PRN
Qty: 60 TABLET | Refills: 0 | Status: SHIPPED | OUTPATIENT
Start: 2024-08-05

## 2024-09-23 DIAGNOSIS — Z96.649 S/P REVISION OF TOTAL HIP: ICD-10-CM

## 2024-09-23 DIAGNOSIS — Z96.641 HISTORY OF TOTAL RIGHT HIP REPLACEMENT: ICD-10-CM

## 2024-09-24 RX ORDER — TRAMADOL HYDROCHLORIDE 50 MG/1
50 TABLET ORAL EVERY 8 HOURS PRN
Qty: 60 TABLET | Refills: 0 | Status: SHIPPED | OUTPATIENT
Start: 2024-09-24

## 2024-11-12 DIAGNOSIS — Z96.649 S/P REVISION OF TOTAL HIP: ICD-10-CM

## 2024-11-12 DIAGNOSIS — Z96.641 HISTORY OF TOTAL RIGHT HIP REPLACEMENT: ICD-10-CM

## 2024-11-12 RX ORDER — TRAMADOL HYDROCHLORIDE 50 MG/1
50 TABLET ORAL EVERY 8 HOURS PRN
Qty: 60 TABLET | Refills: 0 | Status: SHIPPED | OUTPATIENT
Start: 2024-11-12

## 2024-11-12 NOTE — TELEPHONE ENCOUNTER
Patient's last refill was 9/24/24 (quantity 60). His next appointment with you is scheduled for 2/7/25. Please advise.

## 2025-01-16 DIAGNOSIS — Z96.649 S/P REVISION OF TOTAL HIP: ICD-10-CM

## 2025-01-16 DIAGNOSIS — Z96.641 HISTORY OF TOTAL RIGHT HIP REPLACEMENT: ICD-10-CM

## 2025-01-16 RX ORDER — TRAMADOL HYDROCHLORIDE 50 MG/1
50 TABLET ORAL EVERY 8 HOURS PRN
Qty: 60 TABLET | Refills: 0 | Status: SHIPPED | OUTPATIENT
Start: 2025-01-16

## 2025-02-13 ENCOUNTER — OFFICE VISIT (OUTPATIENT)
Dept: ORTHOPEDIC SURGERY | Facility: CLINIC | Age: 75
End: 2025-02-13
Payer: MEDICARE

## 2025-02-13 VITALS
SYSTOLIC BLOOD PRESSURE: 108 MMHG | DIASTOLIC BLOOD PRESSURE: 70 MMHG | HEIGHT: 69 IN | BODY MASS INDEX: 23.96 KG/M2 | WEIGHT: 161.8 LBS

## 2025-02-13 DIAGNOSIS — M70.50 PES ANSERINE BURSITIS: Primary | ICD-10-CM

## 2025-02-13 DIAGNOSIS — M17.12 PRIMARY OSTEOARTHRITIS OF LEFT KNEE: ICD-10-CM

## 2025-02-13 DIAGNOSIS — Z96.649 S/P REVISION OF TOTAL HIP: ICD-10-CM

## 2025-02-13 DIAGNOSIS — M25.562 LEFT KNEE PAIN, UNSPECIFIED CHRONICITY: ICD-10-CM

## 2025-02-13 DIAGNOSIS — Z96.643 HISTORY OF TOTAL HIP REPLACEMENT, BILATERAL: ICD-10-CM

## 2025-02-13 RX ORDER — AZITHROMYCIN 250 MG/1
TABLET, FILM COATED ORAL
COMMUNITY
Start: 2024-11-04

## 2025-02-13 RX ORDER — TRIAMCINOLONE ACETONIDE 40 MG/ML
40 INJECTION, SUSPENSION INTRA-ARTICULAR; INTRAMUSCULAR
Status: COMPLETED | OUTPATIENT
Start: 2025-02-13 | End: 2025-02-13

## 2025-02-13 RX ORDER — BUPIVACAINE HYDROCHLORIDE 2.5 MG/ML
2 INJECTION, SOLUTION EPIDURAL; INFILTRATION; INTRACAUDAL
Status: COMPLETED | OUTPATIENT
Start: 2025-02-13 | End: 2025-02-13

## 2025-02-13 RX ORDER — LIDOCAINE HYDROCHLORIDE 10 MG/ML
2 INJECTION, SOLUTION EPIDURAL; INFILTRATION; INTRACAUDAL; PERINEURAL
Status: COMPLETED | OUTPATIENT
Start: 2025-02-13 | End: 2025-02-13

## 2025-02-13 RX ADMIN — BUPIVACAINE HYDROCHLORIDE 2 ML: 2.5 INJECTION, SOLUTION EPIDURAL; INFILTRATION; INTRACAUDAL at 08:33

## 2025-02-13 RX ADMIN — TRIAMCINOLONE ACETONIDE 40 MG: 40 INJECTION, SUSPENSION INTRA-ARTICULAR; INTRAMUSCULAR at 08:33

## 2025-02-13 RX ADMIN — LIDOCAINE HYDROCHLORIDE 2 ML: 10 INJECTION, SOLUTION EPIDURAL; INFILTRATION; INTRACAUDAL; PERINEURAL at 08:33

## 2025-02-13 NOTE — PROGRESS NOTES
Orthopaedic Clinic Note: Hip Established Patient    Chief Complaint   Patient presents with    Left Knee - Pain    Follow-up     1 year follow up - history of bilateral hip replacements        HPI    It has been 1  year(s) since Mr. Paz's last visit. He returns to clinic today for follow-up bilateral total hip arthroplasty.  Pain remains 1/10 on the pain scale.  He denies any complications with the hip replacements since his last visit.  He does have a new complaint of left knee pain along the medial proximal tibia that he rates a 3/10 on the pain scale.  The pain began approximately a week ago while he was jogging.  It is causing such discomfort that he is unable to continue jogging or rigorous activity.  Requesting intervention for his knee pain as well as follow-up for bilateral hips.    Past Medical History:   Diagnosis Date    Arthritis     Kidney stone       Past Surgical History:   Procedure Laterality Date    HIP ARTHROPLASTY      HIP SURGERY Right     [facility]    TOTAL HIP ARTHROPLASTY Left 12/11/2017    Procedure: TOTAL HIP ARTHROPLASTY LEFT;  Surgeon: Reed Lackey MD;  Location: [facility];  Service:       Family History   Problem Relation Age of Onset    Cancer Mother     Cancer Father      Social History     Socioeconomic History    Marital status:    Tobacco Use    Smoking status: Former     Current packs/day: 1.00     Average packs/day: 1 pack/day for 10.0 years (10.0 ttl pk-yrs)     Types: Cigarettes    Smokeless tobacco: Never   Vaping Use    Vaping status: Never Used   Substance and Sexual Activity    Alcohol use: Yes     Comment: occasional    Drug use: No    Sexual activity: Defer      Current Outpatient Medications on File Prior to Visit   Medication Sig Dispense Refill    anastrozole (ARIMIDEX) 1 MG tablet TAKE ONE TABLET THREE TIMES PER WEEK      azithromycin (ZITHROMAX) 250 MG tablet       celecoxib (CeleBREX) 100 MG capsule TAKE ONE CAPSULE BY MOUTH TWO TIMES A DAY 60  "capsule 0    cephalexin (KEFLEX) 500 MG capsule Take 1 capsule by mouth 3 (Three) Times a Day.  1    Diclofenac Sodium (VOLTAREN) 1 % gel gel APPLY AS NEEDED TO AFFECTED JOINT      hydrOXYzine (ATARAX) 50 MG tablet Take 1 tablet by mouth At Night As Needed for Itching.      methocarbamol (ROBAXIN) 750 MG tablet TAKE 1 TABLET BY MOUTH FOUR TIMES DAILY 90 tablet 0    sertraline (ZOLOFT) 100 MG tablet Take 2 tablets by mouth Daily.      tamsulosin (FLOMAX) 0.4 MG capsule 24 hr capsule Take 1 capsule by mouth Daily.      traMADol (ULTRAM) 50 MG tablet TAKE 1 TABLET BY MOUTH EVERY 8 HOURS AS NEEDED FOR MODERATE PAIN 60 tablet 0    valACYclovir (VALTREX) 500 MG tablet Take 1 tablet by mouth Daily.      [DISCONTINUED] QUEtiapine (SEROquel) 25 MG tablet Take 25 mg by mouth Every Night.       No current facility-administered medications on file prior to visit.      No Known Allergies     Review of Systems   Constitutional: Negative.    HENT: Negative.     Eyes: Negative.    Respiratory: Negative.     Cardiovascular: Negative.    Gastrointestinal: Negative.    Endocrine: Negative.    Genitourinary: Negative.    Musculoskeletal:  Positive for arthralgias.   Skin: Negative.    Allergic/Immunologic: Negative.    Neurological: Negative.    Hematological: Negative.    Psychiatric/Behavioral: Negative.          The patient's Review of Systems was personally reviewed and confirmed as accurate.    Physical Exam  Blood pressure 108/70, height 175.7 cm (69.17\"), weight 73.4 kg (161 lb 12.8 oz).    Body mass index is 23.77 kg/m².    GENERAL APPEARANCE: awake, alert, oriented, in no acute distress and well developed, well nourished  LUNGS:  breathing nonlabored  EXTREMITIES: no clubbing, cyanosis  PERIPHERAL PULSES: palpable dorsalis pedis and posterior tibial pulses bilaterally.    GAIT:  Normal            Hip Exam:  Bilateral     RANGE OF MOTION:  EXTENSION/FLEXION:  normal (0-110 degrees)  IR (at 90 degrees of flexion):  20  ER (at 90 " degrees of flexion):  40  PAIN WITH HIP MOTION:  no  PAIN WITH LOGROLL:  no      STINCHFIELD TEST: negative     STRENGTH:  ABDUCTOR:    5/5  ADDUCTOR:  5/5  HIP FLEXION:  5/5     GREATER TROCHANTER BURSAL PAIN:  yes  Tender to palpation about the left ischial bursa    SENSATION TO LIGHT TOUCH:  DEEP PERONEAL/SUPERFICIAL PERONEAL/SURAL/SAPHENOUS/TIBIAL:   intact    EDEMA:  no  ERYTHEMA:  no  WOUNDS/INCISIONS:   no  ----------  Left knee exam: ----------  ----------  ALIGNMENT: neutral    RANGE OF MOTION:  Normal (0-120 degrees) with no extensor lag or flexion contracture  LIGAMENTOUS STABILITY:   stable to varus and valgus stress at terminal extension and 30 degrees without any evidence of laxity     STRENGTH:  5/5 knee flexion, extension. 5/5 ankle dorsiflexion and plantarflexion.     PAIN WITH PALPATION: pes bursa  KNEE EFFUSION: no  PAIN WITH KNEE ROM: no  PATELLAR CREPITUS: no  SPECIAL EXAM FINDINGS:  none  _________________________________________________________________  _________________________________________________________________    RADIOGRAPHIC FINDINGS:   Indication: Status post bilateral total hip arthroplasty, left knee pain    Comparison: Todays xrays were compared to previous xrays from 4/5/2022 and 2/2/2024    AP pelvis:Right: Demonstrate a well positioned revision total hip without evidence of wear, loosening, fracture or osteolysis, femoral head is concentrically reduced within the acetabulum and No significant changes compared to prior radiographs.;Left: Demonstrate a well positioned total hip without evidence of wear, loosening, fracture or osteolysis, femoral head is concentrically reduced within the acetabulum and No significant changes compared to prior radiographs.    Left knee 4 views: Mild to moderate tricompartmental osteoarthritis with neutral alignment.  Small periarticular osteophytes visualized in all compartments.  No acute bony injury or fracture.    Assessment/Plan:   Diagnosis  Plan   1. Pes anserine bursitis        2. Left knee pain, unspecified chronicity  XR Knee 4+ View Left      3. History of total hip replacement, bilateral  XR Pelvis 1 or 2 View      4. S/P revision of total hip        5. Primary osteoarthritis of left knee          Patient is doing well status post bilateral total hip arthroplasties.  I recommend continued activity as tolerated without restrictions.  In regards to his knee, he has mild arthritis that is minimally symptomatic.  He is primarily suffering from pes bursitis.  He is agreeable to pes bursa cortisone injection today.  He will follow-up as needed.    Procedure Note:  I discussed with the patient the potential benefits of performing a therapeutic injection of the left knee pes bursa as well as potential risks including but not limited to infection, swelling, pain, bleeding, bruising, nerve/vessel damage, skin color changes, transient elevation in blood glucose levels, and fat atrophy. After informed consent and after the area was prepped with alcohol, ethyl chloride was used to numb the skin. Via the anteromedial approach, 2 cc of 1% lidocaine, 2 cc of 0.25% bupivicaine and 1 cc of 40mg/ml of Kenalog were injected into the left knee pes bursa. The patient tolerated the procedure well. There were no complications. A sterile dressing was placed over the injection site.    Reed Lackey MD  02/13/25  08:20 EST

## 2025-02-13 NOTE — PROGRESS NOTES
Procedure   - Large Joint Arthrocentesis: L anserine bursa on 2/13/2025 8:33 AM  Indications: pain  Details: 21 G needle, anteromedial approach  Medications: 2 mL bupivacaine (PF) 0.25 %; 2 mL lidocaine PF 1% 1 %; 40 mg triamcinolone acetonide 40 MG/ML  Outcome: tolerated well, no immediate complications  Procedure, treatment alternatives, risks and benefits explained, specific risks discussed. Consent was given by the patient. Immediately prior to procedure a time out was called to verify the correct patient, procedure, equipment, support staff and site/side marked as required. Patient was prepped and draped in the usual sterile fashion.

## 2025-02-17 DIAGNOSIS — Z96.641 HISTORY OF TOTAL RIGHT HIP REPLACEMENT: ICD-10-CM

## 2025-02-17 DIAGNOSIS — Z96.649 S/P REVISION OF TOTAL HIP: ICD-10-CM

## 2025-02-17 RX ORDER — TRAMADOL HYDROCHLORIDE 50 MG/1
50 TABLET ORAL EVERY 8 HOURS PRN
Qty: 60 TABLET | Refills: 0 | Status: SHIPPED | OUTPATIENT
Start: 2025-02-17

## 2025-03-03 ENCOUNTER — TELEPHONE (OUTPATIENT)
Dept: ORTHOPEDIC SURGERY | Facility: CLINIC | Age: 75
End: 2025-03-03
Payer: MEDICARE

## 2025-03-03 NOTE — TELEPHONE ENCOUNTER
Caller: Prashanth Paz IV    Relationship: Self    Best call back number: [phone]    What orders are you requesting (i.e. lab or imaging): MRI LEFT KNEE, UPPER LEFT LEG    In what timeframe would the patient need to come in: ASAP    Where will you receive your lab/imaging services: [facility]    Additional notes: PLEASE CALL PATIENT ONCE ORDER HAS BEEN SENT

## 2025-03-04 DIAGNOSIS — M70.50 PES ANSERINE BURSITIS: ICD-10-CM

## 2025-03-04 DIAGNOSIS — M17.12 PRIMARY OSTEOARTHRITIS OF LEFT KNEE: Primary | ICD-10-CM

## 2025-03-04 DIAGNOSIS — M25.562 LEFT KNEE PAIN, UNSPECIFIED CHRONICITY: ICD-10-CM

## 2025-03-05 NOTE — TELEPHONE ENCOUNTER
I called the patient but his mailbox was full and I was unable to leave a voicemail.    HUB OKAY TO RELAY:    Patient's MRI order has been sent to: [facility] as requested.    Amber Soni MA

## 2025-03-10 ENCOUNTER — TELEPHONE (OUTPATIENT)
Dept: ORTHOPEDIC SURGERY | Facility: CLINIC | Age: 75
End: 2025-03-10

## 2025-03-10 DIAGNOSIS — M17.12 PRIMARY OSTEOARTHRITIS OF LEFT KNEE: ICD-10-CM

## 2025-03-10 DIAGNOSIS — M25.562 LEFT KNEE PAIN, UNSPECIFIED CHRONICITY: Primary | ICD-10-CM

## 2025-03-10 DIAGNOSIS — M70.50 PES ANSERINE BURSITIS: ICD-10-CM

## 2025-03-10 NOTE — TELEPHONE ENCOUNTER
Caller: Prashanth Paz IV    Relationship: Self    Best call back number: [phone]    What orders are you requesting (i.e. lab or imaging): PT LEFT KNEE    In what timeframe would the patient need to come in: ASAP    Where will you receive your lab/imaging services: CANNOT REMEMBER THE NAME OF THE FACILITY    Additional notes: FAX:  [phone]

## 2025-03-21 ENCOUNTER — OFFICE VISIT (OUTPATIENT)
Dept: ORTHOPEDIC SURGERY | Facility: CLINIC | Age: 75
End: 2025-03-21
Payer: MEDICARE

## 2025-03-21 VITALS
BODY MASS INDEX: 23.34 KG/M2 | HEIGHT: 69 IN | DIASTOLIC BLOOD PRESSURE: 74 MMHG | SYSTOLIC BLOOD PRESSURE: 132 MMHG | WEIGHT: 157.6 LBS

## 2025-03-21 DIAGNOSIS — M70.50 PES ANSERINE BURSITIS: ICD-10-CM

## 2025-03-21 DIAGNOSIS — Z96.641 HISTORY OF TOTAL RIGHT HIP REPLACEMENT: ICD-10-CM

## 2025-03-21 DIAGNOSIS — M17.12 PRIMARY OSTEOARTHRITIS OF LEFT KNEE: Primary | ICD-10-CM

## 2025-03-21 PROCEDURE — 1160F RVW MEDS BY RX/DR IN RCRD: CPT | Performed by: ORTHOPAEDIC SURGERY

## 2025-03-21 PROCEDURE — 1159F MED LIST DOCD IN RCRD: CPT | Performed by: ORTHOPAEDIC SURGERY

## 2025-03-21 PROCEDURE — 99213 OFFICE O/P EST LOW 20 MIN: CPT | Performed by: ORTHOPAEDIC SURGERY

## 2025-03-21 RX ORDER — TRAMADOL HYDROCHLORIDE 50 MG/1
50 TABLET ORAL EVERY 8 HOURS PRN
Qty: 60 TABLET | Refills: 0 | Status: SHIPPED | OUTPATIENT
Start: 2025-03-21

## 2025-03-21 NOTE — PROGRESS NOTES
Orthopaedic Clinic Note: Knee Established Patient    Chief Complaint   Patient presents with    Follow-up     5 week follow up---Left Knee - Pain        HPI    It has been 5  week(s) since Mr. Paz's last visit. He returns to clinic today for follow-up left knee pain.  He was last seen 5 weeks ago and underwent pes bursa cortisone injection.  Injection did provide some trace relief but his pain has persisted.  He subsequently had an MRI with for which she presents for follow-up and discussion of MRI findings.  He continues to complain of pain in the medial proximal tibia.  Overall he is doing about same.    Past Medical History:   Diagnosis Date    Arthritis     Kidney stone       Past Surgical History:   Procedure Laterality Date    HIP ARTHROPLASTY      HIP SURGERY Right     [facility]    TOTAL HIP ARTHROPLASTY Left 12/11/2017    Procedure: TOTAL HIP ARTHROPLASTY LEFT;  Surgeon: Reed Lackey MD;  Location: [facility];  Service:       Family History   Problem Relation Age of Onset    Cancer Mother     Cancer Father      Social History     Socioeconomic History    Marital status:    Tobacco Use    Smoking status: Former     Current packs/day: 1.00     Average packs/day: 1 pack/day for 10.0 years (10.0 ttl pk-yrs)     Types: Cigarettes    Smokeless tobacco: Never   Vaping Use    Vaping status: Never Used   Substance and Sexual Activity    Alcohol use: Yes     Comment: occasional    Drug use: No    Sexual activity: Defer      Current Outpatient Medications on File Prior to Visit   Medication Sig Dispense Refill    anastrozole (ARIMIDEX) 1 MG tablet TAKE ONE TABLET THREE TIMES PER WEEK      azithromycin (ZITHROMAX) 250 MG tablet       celecoxib (CeleBREX) 100 MG capsule TAKE ONE CAPSULE BY MOUTH TWO TIMES A DAY 60 capsule 0    cephalexin (KEFLEX) 500 MG capsule Take 1 capsule by mouth 3 (Three) Times a Day.  1    Diclofenac Sodium (VOLTAREN) 1 % gel gel APPLY AS NEEDED TO AFFECTED JOINT       "hydrOXYzine (ATARAX) 50 MG tablet Take 1 tablet by mouth At Night As Needed for Itching.      methocarbamol (ROBAXIN) 750 MG tablet TAKE 1 TABLET BY MOUTH FOUR TIMES DAILY 90 tablet 0    sertraline (ZOLOFT) 100 MG tablet Take 2 tablets by mouth Daily.      tamsulosin (FLOMAX) 0.4 MG capsule 24 hr capsule Take 1 capsule by mouth Daily.      valACYclovir (VALTREX) 500 MG tablet Take 1 tablet by mouth Daily.      [DISCONTINUED] traMADol (ULTRAM) 50 MG tablet TAKE 1 TABLET BY MOUTH EVERY 8 HOURS AS NEEDED FOR MODERATE PAIN 60 tablet 0     No current facility-administered medications on file prior to visit.      No Known Allergies     Review of Systems   Constitutional: Negative.    HENT: Negative.     Eyes: Negative.    Respiratory: Negative.     Cardiovascular: Negative.    Gastrointestinal: Negative.    Endocrine: Negative.    Genitourinary: Negative.    Musculoskeletal:  Positive for arthralgias.   Skin: Negative.    Allergic/Immunologic: Negative.    Neurological: Negative.    Hematological: Negative.    Psychiatric/Behavioral: Negative.          The patient's Review of Systems was personally reviewed and confirmed as accurate.    Physical Exam  Blood pressure 132/74, height 175.7 cm (69.17\"), weight 71.5 kg (157 lb 9.6 oz).    Body mass index is 23.16 kg/m².    GENERAL APPEARANCE: awake, alert, oriented, in no acute distress and well developed, well nourished  LUNGS:  breathing nonlabored  EXTREMITIES: no clubbing, cyanosis  PERIPHERAL PULSES: palpable dorsalis pedis and posterior tibial pulses bilaterally.    GAIT:  Normal        ----------  Left Knee Exam:  ----------  ALIGNMENT: neutral  ----------  RANGE OF MOTION:  Normal (0-120 degrees) with no extensor lag or flexion contracture  LIGAMENTOUS STABILITY:   stable to varus and valgus stress at terminal extension and 30 degrees without any evidence of laxity  ----------  STRENGTH:  KNEE FLEXION 5/5  KNEE EXTENSION  5/5  ANKLE DORSIFLEXION  5/5  ANKLE " PLANTARFLEXION  5/5  ----------  PAIN WITH PALPATION:pes bursa  KNEE EFFUSION: no  PAIN WITH KNEE ROM: yes  PATELLAR CREPITUS:  no  ----------  SENSATION TO LIGHT TOUCH:  DEEP PERONEAL/SUPERFICIAL PERONEAL/SURAL/SAPHENOUS/TIBIAL:    intact  ----------  EDEMA:  no  ERYTHEMA:    no  WOUNDS/INCISIONS:   no  _____________________________________________________________________  _____________________________________________________________________    RADIOGRAPHIC FINDINGS:   MRI of the left knee from 3/13/2025 was personally reviewed and interpreted.  Imaging reveals small degenerative longitudinal tear of the posterior horn of the medial meniscus.  Otherwise moderate tricompartmental arthritic changes.  Trace effusion about the pes bursa indicative of pes bursitis.    Assessment/Plan:   Diagnosis Plan   1. Primary osteoarthritis of left knee        2. History of total right hip replacement  traMADol (ULTRAM) 50 MG tablet      3. Pes anserine bursitis          I reviewed the MRI findings with the patient.  I discussed that I see no MRI findings that she would indicate surgical intervention would be of benefit.  He does have moderate arthritic changes throughout the knee as well as pes bursitis.  I recommended topical anti-inflammatory to the affected area.  Activity modification to allow the bursitis settle down.  He will follow-up with me as needed.      Reed Lackey MD  03/21/25  08:42 EDT

## 2025-05-13 DIAGNOSIS — Z96.641 HISTORY OF TOTAL RIGHT HIP REPLACEMENT: ICD-10-CM

## 2025-05-13 RX ORDER — TRAMADOL HYDROCHLORIDE 50 MG/1
50 TABLET ORAL EVERY 8 HOURS PRN
Qty: 60 TABLET | Refills: 2 | Status: SHIPPED | OUTPATIENT
Start: 2025-05-13

## (undated) DEVICE — SOL POVIDONE IODINE 10PCT 3/4OZ STRL

## (undated) DEVICE — NDL HYPO ECLPS SFTY 18G 1 1/2IN

## (undated) DEVICE — SST TWIST DRILL, STANDARD, 2MM DIA. X 127MM: Brand: MICROAIRE®

## (undated) DEVICE — SOL LR 1000ML

## (undated) DEVICE — MEDI-VAC YANKAUER SUCTION HANDLE W/BULBOUS TIP: Brand: CARDINAL HEALTH

## (undated) DEVICE — SKIN AFFIX SURG ADHESIVE 72/CS 0.55ML: Brand: MEDLINE

## (undated) DEVICE — NDL SPINE 22G 31/2IN BLK

## (undated) DEVICE — SYR LUERLOK 50ML

## (undated) DEVICE — CVR HNDL LT SURG ACCSSRY BLU STRL

## (undated) DEVICE — HDRST POSTIN FM CRDL TRACH SLOT 9X8X4IN

## (undated) DEVICE — COATED BRAIDED POLYESTER: Brand: TI-CRON

## (undated) DEVICE — NDL FLTR BLNT 18G 1 1/2IN

## (undated) DEVICE — STRYKER PERFORMANCE SERIES SAGITTAL BLADE: Brand: STRYKER PERFORMANCE SERIES

## (undated) DEVICE — DRAPE,REIN 53X77,STERILE: Brand: MEDLINE

## (undated) DEVICE — DRSNG SURG AQUACEL AG 9X15CM

## (undated) DEVICE — SUT MONOCRYL PLS ANTIB UND 3/0  PS1 27IN

## (undated) DEVICE — PK HIP TOTL UNIV 10

## (undated) DEVICE — GLV SURG SIGNATURE TOUCH PF LTX 8 STRL BX/50

## (undated) DEVICE — GLV SURG TRIUMPH ORTHO W/ALOE PF LTX 8 STRL

## (undated) DEVICE — 3M™ WARMING BLANKET, UPPER BODY, 10 PER CASE, 42268: Brand: BAIR HUGGER™

## (undated) DEVICE — ANTIBACTERIAL UNDYED BRAIDED (POLYGLACTIN 910), SYNTHETIC ABSORBABLE SUTURE: Brand: COATED VICRYL

## (undated) DEVICE — SYR LUERLOK 30CC

## (undated) DEVICE — CANN NASL CO2 DIVIDED A/

## (undated) DEVICE — TB SXN FRAZIER 12F STRL

## (undated) DEVICE — HEWSON SUTURE RETRIEVER: Brand: HEWSON SUTURE RETRIEVER

## (undated) DEVICE — TRY EPID SFTY 18G 3.5IN 1T7680

## (undated) DEVICE — MEDI-VAC NON-CONDUCTIVE SUCTION TUBING: Brand: CARDINAL HEALTH

## (undated) DEVICE — ADAPT ST INFUS ADMIN SYR 70IN